# Patient Record
Sex: FEMALE | Race: WHITE | NOT HISPANIC OR LATINO | Employment: FULL TIME | URBAN - METROPOLITAN AREA
[De-identification: names, ages, dates, MRNs, and addresses within clinical notes are randomized per-mention and may not be internally consistent; named-entity substitution may affect disease eponyms.]

---

## 2017-03-22 ENCOUNTER — HISTORICAL (OUTPATIENT)
Dept: ADMINISTRATIVE | Facility: HOSPITAL | Age: 42
End: 2017-03-22

## 2018-05-11 ENCOUNTER — HISTORICAL (OUTPATIENT)
Dept: ADMINISTRATIVE | Facility: HOSPITAL | Age: 43
End: 2018-05-11

## 2018-05-11 LAB
ALBUMIN SERPL-MCNC: 3.8 GM/DL (ref 3.4–5)
ALBUMIN/GLOB SERPL: 1 RATIO (ref 1.1–2)
ALP SERPL-CCNC: 52 UNIT/L (ref 38–126)
ALT SERPL-CCNC: 30 UNIT/L (ref 12–78)
AST SERPL-CCNC: 18 UNIT/L (ref 15–37)
BILIRUB SERPL-MCNC: 0.5 MG/DL (ref 0.2–1)
BILIRUBIN DIRECT+TOT PNL SERPL-MCNC: 0.1 MG/DL (ref 0–0.5)
BILIRUBIN DIRECT+TOT PNL SERPL-MCNC: 0.4 MG/DL (ref 0–0.8)
BUN SERPL-MCNC: 17 MG/DL (ref 7–18)
CALCIUM SERPL-MCNC: 9.3 MG/DL (ref 8.5–10.1)
CHLORIDE SERPL-SCNC: 107 MMOL/L (ref 98–107)
CHOLEST SERPL-MCNC: 208 MG/DL (ref 0–200)
CHOLEST/HDLC SERPL: 5 {RATIO} (ref 0–4)
CO2 SERPL-SCNC: 28 MMOL/L (ref 21–32)
CREAT SERPL-MCNC: 0.78 MG/DL (ref 0.55–1.02)
ERYTHROCYTE [DISTWIDTH] IN BLOOD BY AUTOMATED COUNT: 11.9 % (ref 11.5–17)
GLOBULIN SER-MCNC: 3.7 GM/DL (ref 2.4–3.5)
GLUCOSE SERPL-MCNC: 92 MG/DL (ref 74–106)
HCT VFR BLD AUTO: 43.7 % (ref 37–47)
HDLC SERPL-MCNC: 42 MG/DL (ref 35–60)
HGB BLD-MCNC: 14.4 GM/DL (ref 12–16)
LDLC SERPL CALC-MCNC: 136 MG/DL (ref 0–129)
MCH RBC QN AUTO: 32.5 PG (ref 27–31)
MCHC RBC AUTO-ENTMCNC: 33 GM/DL (ref 33–36)
MCV RBC AUTO: 98.6 FL (ref 80–94)
PLATELET # BLD AUTO: 177 X10(3)/MCL (ref 130–400)
PMV BLD AUTO: 9.5 FL (ref 9.4–12.4)
POTASSIUM SERPL-SCNC: 4.8 MMOL/L (ref 3.5–5.1)
PROT SERPL-MCNC: 7.5 GM/DL (ref 6.4–8.2)
RBC # BLD AUTO: 4.43 X10(6)/MCL (ref 4.2–5.4)
SODIUM SERPL-SCNC: 141 MMOL/L (ref 136–145)
TRIGL SERPL-MCNC: 149 MG/DL (ref 30–150)
TSH SERPL-ACNC: 1.76 MIU/L (ref 0.36–3.74)
VLDLC SERPL CALC-MCNC: 30 MG/DL
WBC # SPEC AUTO: 6 X10(3)/MCL (ref 4.5–11.5)

## 2018-09-25 ENCOUNTER — HISTORICAL (OUTPATIENT)
Dept: LAB | Facility: HOSPITAL | Age: 43
End: 2018-09-25

## 2018-09-26 LAB — GRAM STN SPEC: NORMAL

## 2018-09-27 LAB — FINAL CULTURE: NO GROWTH

## 2018-10-29 LAB — FINAL CULTURE: NORMAL

## 2019-11-06 ENCOUNTER — HISTORICAL (OUTPATIENT)
Dept: ADMINISTRATIVE | Facility: HOSPITAL | Age: 44
End: 2019-11-06

## 2019-11-06 LAB
ABS NEUT (OLG): 2.77 X10(3)/MCL (ref 2.1–9.2)
ALBUMIN SERPL-MCNC: 3.8 GM/DL (ref 3.4–5)
ALBUMIN/GLOB SERPL: 1.3 {RATIO}
ALP SERPL-CCNC: 36 UNIT/L (ref 38–126)
ALT SERPL-CCNC: 23 UNIT/L (ref 12–78)
AST SERPL-CCNC: 12 UNIT/L (ref 15–37)
BASOPHILS # BLD AUTO: 0 X10(3)/MCL (ref 0–0.2)
BASOPHILS NFR BLD AUTO: 1 %
BILIRUB SERPL-MCNC: 0.9 MG/DL (ref 0.2–1)
BILIRUBIN DIRECT+TOT PNL SERPL-MCNC: 0.2 MG/DL (ref 0–0.2)
BILIRUBIN DIRECT+TOT PNL SERPL-MCNC: 0.7 MG/DL (ref 0–0.8)
BUN SERPL-MCNC: 19 MG/DL (ref 7–18)
CALCIUM SERPL-MCNC: 9.3 MG/DL (ref 8.5–10.1)
CHLORIDE SERPL-SCNC: 105 MMOL/L (ref 98–107)
CHOLEST SERPL-MCNC: 148 MG/DL (ref 0–200)
CHOLEST/HDLC SERPL: 3.4 {RATIO} (ref 0–4)
CO2 SERPL-SCNC: 32 MMOL/L (ref 21–32)
CREAT SERPL-MCNC: 0.84 MG/DL (ref 0.55–1.02)
EOSINOPHIL # BLD AUTO: 0.1 X10(3)/MCL (ref 0–0.9)
EOSINOPHIL NFR BLD AUTO: 2 %
ERYTHROCYTE [DISTWIDTH] IN BLOOD BY AUTOMATED COUNT: 11.9 % (ref 11.5–17)
GLOBULIN SER-MCNC: 3 GM/DL (ref 2.4–3.5)
GLUCOSE SERPL-MCNC: 96 MG/DL (ref 74–106)
HCT VFR BLD AUTO: 41.6 % (ref 37–47)
HDLC SERPL-MCNC: 43 MG/DL (ref 35–60)
HGB BLD-MCNC: 13.3 GM/DL (ref 12–16)
LDLC SERPL CALC-MCNC: 93 MG/DL (ref 0–129)
LYMPHOCYTES # BLD AUTO: 1.7 X10(3)/MCL (ref 0.6–4.6)
LYMPHOCYTES NFR BLD AUTO: 34 %
MCH RBC QN AUTO: 31.4 PG (ref 27–31)
MCHC RBC AUTO-ENTMCNC: 32 GM/DL (ref 33–36)
MCV RBC AUTO: 98.1 FL (ref 80–94)
MONOCYTES # BLD AUTO: 0.4 X10(3)/MCL (ref 0.1–1.3)
MONOCYTES NFR BLD AUTO: 8 %
NEUTROPHILS # BLD AUTO: 2.77 X10(3)/MCL (ref 2.1–9.2)
NEUTROPHILS NFR BLD AUTO: 56 %
PLATELET # BLD AUTO: 165 X10(3)/MCL (ref 130–400)
PMV BLD AUTO: 10.6 FL (ref 9.4–12.4)
POTASSIUM SERPL-SCNC: 5.2 MMOL/L (ref 3.5–5.1)
PROT SERPL-MCNC: 6.8 GM/DL (ref 6.4–8.2)
RBC # BLD AUTO: 4.24 X10(6)/MCL (ref 4.2–5.4)
SODIUM SERPL-SCNC: 141 MMOL/L (ref 136–145)
TRIGL SERPL-MCNC: 61 MG/DL (ref 30–150)
TSH SERPL-ACNC: 1.86 MIU/L (ref 0.36–3.74)
VLDLC SERPL CALC-MCNC: 12 MG/DL
WBC # SPEC AUTO: 4.9 X10(3)/MCL (ref 4.5–11.5)

## 2021-07-02 LAB
PAP RECOMMENDATION EXT: NORMAL
PAP SMEAR: NORMAL

## 2021-10-05 LAB — BCS RECOMMENDATION EXT: NORMAL

## 2022-05-23 ENCOUNTER — OFFICE VISIT (OUTPATIENT)
Dept: PRIMARY CARE CLINIC | Facility: CLINIC | Age: 47
End: 2022-05-23
Payer: COMMERCIAL

## 2022-05-23 VITALS
RESPIRATION RATE: 18 BRPM | WEIGHT: 188.69 LBS | HEIGHT: 68 IN | BODY MASS INDEX: 28.6 KG/M2 | HEART RATE: 76 BPM | SYSTOLIC BLOOD PRESSURE: 124 MMHG | DIASTOLIC BLOOD PRESSURE: 86 MMHG | OXYGEN SATURATION: 98 % | TEMPERATURE: 98 F

## 2022-05-23 DIAGNOSIS — Z11.59 ENCOUNTER FOR HEPATITIS C SCREENING TEST FOR LOW RISK PATIENT: ICD-10-CM

## 2022-05-23 DIAGNOSIS — E78.49 OTHER HYPERLIPIDEMIA: ICD-10-CM

## 2022-05-23 DIAGNOSIS — Z23 NEED FOR TDAP VACCINATION: Primary | ICD-10-CM

## 2022-05-23 DIAGNOSIS — E66.3 OVERWEIGHT (BMI 25.0-29.9): ICD-10-CM

## 2022-05-23 DIAGNOSIS — Z00.00 ENCOUNTER FOR WELLNESS EXAMINATION IN ADULT: ICD-10-CM

## 2022-05-23 PROCEDURE — 90715 TDAP VACCINE GREATER THAN OR EQUAL TO 7YO IM: ICD-10-PCS | Mod: ,,, | Performed by: STUDENT IN AN ORGANIZED HEALTH CARE EDUCATION/TRAINING PROGRAM

## 2022-05-23 PROCEDURE — 90471 IMMUNIZATION ADMIN: CPT | Mod: ,,, | Performed by: STUDENT IN AN ORGANIZED HEALTH CARE EDUCATION/TRAINING PROGRAM

## 2022-05-23 PROCEDURE — 90715 TDAP VACCINE 7 YRS/> IM: CPT | Mod: ,,, | Performed by: STUDENT IN AN ORGANIZED HEALTH CARE EDUCATION/TRAINING PROGRAM

## 2022-05-23 PROCEDURE — 90471 TDAP VACCINE GREATER THAN OR EQUAL TO 7YO IM: ICD-10-PCS | Mod: ,,, | Performed by: STUDENT IN AN ORGANIZED HEALTH CARE EDUCATION/TRAINING PROGRAM

## 2022-05-23 PROCEDURE — 99386 PREV VISIT NEW AGE 40-64: CPT | Mod: 25,,, | Performed by: STUDENT IN AN ORGANIZED HEALTH CARE EDUCATION/TRAINING PROGRAM

## 2022-05-23 PROCEDURE — 99386 PR PREVENTIVE VISIT,NEW,40-64: ICD-10-PCS | Mod: 25,,, | Performed by: STUDENT IN AN ORGANIZED HEALTH CARE EDUCATION/TRAINING PROGRAM

## 2022-05-23 RX ORDER — ACETAMINOPHEN, DIPHENHYDRAMINE HCL, PHENYLEPHRINE HCL 325; 25; 5 MG/1; MG/1; MG/1
15 TABLET ORAL NIGHTLY
COMMUNITY

## 2022-05-23 RX ORDER — MULTIVITAMIN
1 TABLET ORAL DAILY
COMMUNITY

## 2022-05-23 NOTE — PROGRESS NOTES
Chief Complaint  Chief Complaint   Patient presents with    Establish Care       HPI  Carmita BISI Menjivar is a 46 y.o. female with no significant medical diagnoses who presents to clinic to [facility] and wellness examination.   Histories are reviewed.   Patient has no complaint today. She works out regularly and recently lost a lot of weight.   Denies smoking. Drinks socially.   Lives at home with  and 2 children (17 and 15 year old)  Eats healthy  Patient follows up with Dr Pagette for Gyn needs and [facility]G. Next visit 2022    Health Maintenance       Date Due Completion Date    Hepatitis C Screening Never done ---    Cervical Cancer Screening Never done ---    HIV Screening Never done ---    TETANUS VACCINE Never done ---    Mammogram Never done ---    Colorectal Cancer Screening Never done ---    COVID-19 Vaccine (3 - Booster for Pfizer series) 2022    Influenza Vaccine (Season Ended) 2022    Lipid Panel 2024          PAST MEDICAL HISTORY:  History reviewed. No pertinent past medical history.    PAST SURGICAL HISTORY:  Past Surgical History:   Procedure Laterality Date    ABLATION      Uterine    TONSILLECTOMY      TUBAL LIGATION         SOCIAL HISTORY:  Social History     Socioeconomic History    Marital status:    Tobacco Use    Smoking status: Former Smoker     Types: Cigarettes     Quit date: 2006     Years since quittin.4    Smokeless tobacco: Never Used   Substance and Sexual Activity    Alcohol use: Yes     Comment: 3 times per week     Drug use: Never    Sexual activity: Yes       FAMILY HISTORY:  Family History   Problem Relation Age of Onset    Brain cancer Mother     Melanoma Mother     Heart disease Father     Prostate cancer Father     Coronary artery disease Father        ALLERGIES AND MEDICATIONS: updated and reviewed.  Review of patient's allergies indicates:   Allergen Reactions    Mangosteen (garcinia  "markus) José Miguel Ng      Current Outpatient Medications   Medication Sig Dispense Refill    melatonin 10 mg Tab Take 20 mg by mouth every evening.      multivitamin (THERAGRAN) per tablet Take 1 tablet by mouth once daily.       No current facility-administered medications for this visit.         ROS  Review of Systems   Constitutional: Negative for activity change, appetite change and fatigue.   HENT: Negative for congestion, ear discharge, hearing loss and trouble swallowing.    Eyes: Negative for photophobia and visual disturbance.   Respiratory: Negative for cough, chest tightness and shortness of breath.    Cardiovascular: Negative for chest pain, palpitations and leg swelling.   Gastrointestinal: Negative for abdominal distention, abdominal pain, constipation, diarrhea, nausea and vomiting.   Endocrine: Negative for cold intolerance and heat intolerance.   Genitourinary: Negative for difficulty urinating and flank pain.   Musculoskeletal: Negative for joint swelling and myalgias.   Skin: Negative for color change, rash and wound.   Neurological: Negative for dizziness, weakness, numbness and headaches.   Hematological: Does not bruise/bleed easily.   Psychiatric/Behavioral: Negative for agitation, behavioral problems and sleep disturbance. The patient is not nervous/anxious.            Physical Exam  Vitals:    05/23/22 1405   BP: 124/86   BP Location: Left arm   Patient Position: Sitting   BP Method: Large (Automatic)   Pulse: 76   Resp: 18   Temp: 98.4 °F (36.9 °C)   SpO2: 98%   Weight: 85.6 kg (188 lb 11.4 oz)   Height: 5' 8" (1.727 m)    Body mass index is 28.69 kg/m².  Weight: 85.6 kg (188 lb 11.4 oz)   Height: 5' 8" (172.7 cm)   Physical Exam  Vitals and nursing note reviewed.   Constitutional:       Appearance: Normal appearance.   HENT:      Head: Normocephalic and atraumatic.      Right Ear: Tympanic membrane normal.      Left Ear: Tympanic membrane normal.      Nose: Nose normal.      " Mouth/Throat:      Mouth: Mucous membranes are moist.      Pharynx: Oropharynx is clear.   Eyes:      Extraocular Movements: Extraocular movements intact.      Conjunctiva/sclera: Conjunctivae normal.   Cardiovascular:      Rate and Rhythm: Normal rate and regular rhythm.      Pulses: Normal pulses.   Pulmonary:      Effort: Pulmonary effort is normal.      Breath sounds: Normal breath sounds. No stridor. No wheezing.   Abdominal:      General: There is no distension.      Palpations: Abdomen is soft.      Tenderness: There is no abdominal tenderness.   Musculoskeletal:         General: No swelling, tenderness or deformity. Normal range of motion.      Cervical back: Neck supple.   Lymphadenopathy:      Cervical: No cervical adenopathy.   Skin:     General: Skin is warm.      Capillary Refill: Capillary refill takes less than 2 seconds.      Findings: No rash.   Neurological:      General: No focal deficit present.      Mental Status: She is alert and oriented to person, place, and time. Mental status is at baseline.   Psychiatric:         Mood and Affect: Mood normal.         Thought Content: Thought content normal.         Judgment: Judgment normal.           Assessment & Plan  Problem List Items Addressed This Visit        Cardiac/Vascular    Other hyperlipidemia    Relevant Orders    CBC Auto Differential    Comprehensive Metabolic Panel    Hemoglobin A1C    Lipid Panel    TSH       Endocrine    Overweight (BMI 25.0-29.9)    Relevant Orders    CBC Auto Differential    Comprehensive Metabolic Panel    Hemoglobin A1C    Lipid Panel    TSH      Other Visit Diagnoses     Encounter for wellness examination in adult    -  Primary    Relevant Orders    CBC Auto Differential    Comprehensive Metabolic Panel    Hemoglobin A1C    Lipid Panel    TSH    Encounter for hepatitis C screening test for low risk patient        Relevant Orders    Hepatitis C Antibody        Overall health status was reviewed   Good health habits  reinforced   Pending labs   Appropriate recommendations and preventative care medical information provided, with annual wellness exam encouraged.     Health Maintenance reviewed, Tdap given today     Follow-up: Follow up in about 1 year (around 5/23/2023) for Wellness.

## 2022-05-24 ENCOUNTER — DOCUMENTATION ONLY (OUTPATIENT)
Dept: PRIMARY CARE CLINIC | Facility: CLINIC | Age: 47
End: 2022-05-24
Payer: COMMERCIAL

## 2022-05-26 ENCOUNTER — PATIENT MESSAGE (OUTPATIENT)
Dept: PRIMARY CARE CLINIC | Facility: CLINIC | Age: 47
End: 2022-05-26

## 2022-05-26 ENCOUNTER — CLINICAL SUPPORT (OUTPATIENT)
Dept: PRIMARY CARE CLINIC | Facility: CLINIC | Age: 47
End: 2022-05-26
Payer: COMMERCIAL

## 2022-05-26 DIAGNOSIS — E78.49 OTHER HYPERLIPIDEMIA: ICD-10-CM

## 2022-05-26 DIAGNOSIS — R94.4 ABNORMAL RENAL FUNCTION TEST: Primary | ICD-10-CM

## 2022-05-26 DIAGNOSIS — Z00.00 ENCOUNTER FOR WELLNESS EXAMINATION IN ADULT: ICD-10-CM

## 2022-05-26 DIAGNOSIS — E66.3 OVERWEIGHT (BMI 25.0-29.9): ICD-10-CM

## 2022-05-26 DIAGNOSIS — Z11.59 ENCOUNTER FOR HEPATITIS C SCREENING TEST FOR LOW RISK PATIENT: ICD-10-CM

## 2022-05-26 LAB
ALBUMIN SERPL-MCNC: 4.2 GM/DL (ref 3.5–5)
ALBUMIN/GLOB SERPL: 1.3 RATIO (ref 1.1–2)
ALP SERPL-CCNC: 34 UNIT/L (ref 40–150)
ALT SERPL-CCNC: 12 UNIT/L (ref 0–55)
AST SERPL-CCNC: 16 UNIT/L (ref 5–34)
BASOPHILS # BLD AUTO: 0.07 X10(3)/MCL (ref 0–0.2)
BASOPHILS NFR BLD AUTO: 1 %
BILIRUBIN DIRECT+TOT PNL SERPL-MCNC: 1.1 MG/DL
BUN SERPL-MCNC: 22.4 MG/DL (ref 7–18.7)
CALCIUM SERPL-MCNC: 10.4 MG/DL (ref 8.4–10.2)
CHLORIDE SERPL-SCNC: 107 MMOL/L (ref 98–107)
CHOLEST SERPL-MCNC: 177 MG/DL
CHOLEST/HDLC SERPL: 4 {RATIO} (ref 0–5)
CO2 SERPL-SCNC: 24 MMOL/L (ref 22–29)
CREAT SERPL-MCNC: 1.07 MG/DL (ref 0.55–1.02)
EOSINOPHIL # BLD AUTO: 0.16 X10(3)/MCL (ref 0–0.9)
EOSINOPHIL NFR BLD AUTO: 2.3 %
ERYTHROCYTE [DISTWIDTH] IN BLOOD BY AUTOMATED COUNT: 11.9 % (ref 11.5–17)
EST. AVERAGE GLUCOSE BLD GHB EST-MCNC: 93.9 MG/DL
GLOBULIN SER-MCNC: 3.2 GM/DL (ref 2.4–3.5)
GLUCOSE SERPL-MCNC: 89 MG/DL (ref 74–100)
HBA1C MFR BLD: 4.9 %
HCT VFR BLD AUTO: 42 % (ref 37–47)
HDLC SERPL-MCNC: 48 MG/DL (ref 35–60)
HGB BLD-MCNC: 14.2 GM/DL (ref 12–16)
IMM GRANULOCYTES # BLD AUTO: 0.02 X10(3)/MCL (ref 0–0.02)
IMM GRANULOCYTES NFR BLD AUTO: 0.3 % (ref 0–0.43)
LDLC SERPL CALC-MCNC: 109 MG/DL (ref 50–140)
LYMPHOCYTES # BLD AUTO: 1.43 X10(3)/MCL (ref 0.6–4.6)
LYMPHOCYTES NFR BLD AUTO: 20.8 %
MCH RBC QN AUTO: 32.9 PG (ref 27–31)
MCHC RBC AUTO-ENTMCNC: 33.8 MG/DL (ref 33–36)
MCV RBC AUTO: 97.2 FL (ref 80–94)
MONOCYTES # BLD AUTO: 0.71 X10(3)/MCL (ref 0.1–1.3)
MONOCYTES NFR BLD AUTO: 10.3 %
NEUTROPHILS # BLD AUTO: 4.5 X10(3)/MCL (ref 2.1–9.2)
NEUTROPHILS NFR BLD AUTO: 65.3 %
NRBC BLD AUTO-RTO: 0 %
PLATELET # BLD AUTO: 179 X10(3)/MCL (ref 130–400)
PMV BLD AUTO: 10.9 FL (ref 9.4–12.4)
POTASSIUM SERPL-SCNC: 4.8 MMOL/L (ref 3.5–5.1)
PROT SERPL-MCNC: 7.4 GM/DL (ref 6.4–8.3)
RBC # BLD AUTO: 4.32 X10(6)/MCL (ref 4.2–5.4)
SODIUM SERPL-SCNC: 141 MMOL/L (ref 136–145)
TRIGL SERPL-MCNC: 99 MG/DL (ref 37–140)
TSH SERPL-ACNC: 2.63 UIU/ML (ref 0.35–4.94)
VLDLC SERPL CALC-MCNC: 20 MG/DL
WBC # SPEC AUTO: 6.9 X10(3)/MCL (ref 4.5–11.5)

## 2022-05-26 PROCEDURE — 83036 HEMOGLOBIN GLYCOSYLATED A1C: CPT | Performed by: STUDENT IN AN ORGANIZED HEALTH CARE EDUCATION/TRAINING PROGRAM

## 2022-05-26 PROCEDURE — 86803 HEPATITIS C AB TEST: CPT

## 2022-05-26 PROCEDURE — 36415 PR COLLECTION VENOUS BLOOD,VENIPUNCTURE: ICD-10-PCS | Mod: ,,, | Performed by: STUDENT IN AN ORGANIZED HEALTH CARE EDUCATION/TRAINING PROGRAM

## 2022-05-26 PROCEDURE — 36415 COLL VENOUS BLD VENIPUNCTURE: CPT

## 2022-05-26 PROCEDURE — 80053 COMPREHEN METABOLIC PANEL: CPT | Performed by: STUDENT IN AN ORGANIZED HEALTH CARE EDUCATION/TRAINING PROGRAM

## 2022-05-26 PROCEDURE — 80061 LIPID PANEL: CPT | Performed by: STUDENT IN AN ORGANIZED HEALTH CARE EDUCATION/TRAINING PROGRAM

## 2022-05-26 PROCEDURE — 85025 COMPLETE CBC W/AUTO DIFF WBC: CPT | Performed by: STUDENT IN AN ORGANIZED HEALTH CARE EDUCATION/TRAINING PROGRAM

## 2022-05-26 PROCEDURE — 36415 COLL VENOUS BLD VENIPUNCTURE: CPT | Mod: ,,, | Performed by: STUDENT IN AN ORGANIZED HEALTH CARE EDUCATION/TRAINING PROGRAM

## 2022-05-26 PROCEDURE — 84443 ASSAY THYROID STIM HORMONE: CPT | Performed by: STUDENT IN AN ORGANIZED HEALTH CARE EDUCATION/TRAINING PROGRAM

## 2022-05-27 LAB — HCV AB SERPL QL IA: NONREACTIVE

## 2022-08-16 ENCOUNTER — TELEPHONE (OUTPATIENT)
Dept: PRIMARY CARE CLINIC | Facility: CLINIC | Age: 47
End: 2022-08-16
Payer: COMMERCIAL

## 2022-08-16 DIAGNOSIS — E78.49 OTHER HYPERLIPIDEMIA: Primary | ICD-10-CM

## 2022-08-16 LAB
ALBUMIN SERPL-MCNC: 4.2 GM/DL (ref 3.5–5)
ALBUMIN/GLOB SERPL: 1.3 RATIO (ref 1.1–2)
ALP SERPL-CCNC: 33 UNIT/L (ref 40–150)
ALT SERPL-CCNC: 14 UNIT/L (ref 0–55)
AST SERPL-CCNC: 19 UNIT/L (ref 5–34)
BILIRUBIN DIRECT+TOT PNL SERPL-MCNC: 0.6 MG/DL
BUN SERPL-MCNC: 19.8 MG/DL (ref 7–18.7)
CALCIUM SERPL-MCNC: 10.5 MG/DL (ref 8.4–10.2)
CHLORIDE SERPL-SCNC: 107 MMOL/L (ref 98–107)
CO2 SERPL-SCNC: 28 MMOL/L (ref 22–29)
CREAT SERPL-MCNC: 0.96 MG/DL (ref 0.55–1.02)
GFR SERPLBLD CREATININE-BSD FMLA CKD-EPI: >60 MLS/MIN/1.73/M2
GLOBULIN SER-MCNC: 3.3 GM/DL (ref 2.4–3.5)
GLUCOSE SERPL-MCNC: 93 MG/DL (ref 74–100)
POTASSIUM SERPL-SCNC: 5 MMOL/L (ref 3.5–5.1)
PROT SERPL-MCNC: 7.5 GM/DL (ref 6.4–8.3)
SODIUM SERPL-SCNC: 140 MMOL/L (ref 136–145)

## 2022-08-16 PROCEDURE — 80053 COMPREHEN METABOLIC PANEL: CPT | Performed by: STUDENT IN AN ORGANIZED HEALTH CARE EDUCATION/TRAINING PROGRAM

## 2022-08-16 NOTE — TELEPHONE ENCOUNTER
----- Message from Joel Richards sent at 8/16/2022  8:41 AM CDT -----  .Type:  Needs to reschedule Blood work to this week.    Who Called: pt  Would the patient rather a call back or a response via [facility]? Call back  Best Call Back Number: [phone]  Additional Information: Wanting to reschedule the Nurse Visit for blood work from next week to this week.

## 2022-08-18 ENCOUNTER — TELEPHONE (OUTPATIENT)
Dept: PRIMARY CARE CLINIC | Facility: CLINIC | Age: 47
End: 2022-08-18
Payer: COMMERCIAL

## 2022-08-18 NOTE — TELEPHONE ENCOUNTER
----- Message from Celeste Acosta MD sent at 8/18/2022  9:43 AM CDT -----  Kidney function is improving. Continue with good hydration daily.   Patient still has slightly high calcium level unchanged from last visit- monitor. Stop all Ca supplement if she is taking any, including multivitamin

## 2022-08-18 NOTE — TELEPHONE ENCOUNTER
Patient inquiring about her lab results. She does not have appt coming up. Only labs ordered at her last visit.

## 2022-10-20 ENCOUNTER — OFFICE VISIT (OUTPATIENT)
Dept: URGENT CARE | Facility: CLINIC | Age: 47
End: 2022-10-20
Payer: COMMERCIAL

## 2022-10-20 VITALS
DIASTOLIC BLOOD PRESSURE: 94 MMHG | RESPIRATION RATE: 18 BRPM | SYSTOLIC BLOOD PRESSURE: 143 MMHG | TEMPERATURE: 98 F | WEIGHT: 185 LBS | OXYGEN SATURATION: 99 % | BODY MASS INDEX: 27.4 KG/M2 | HEART RATE: 77 BPM | HEIGHT: 69 IN

## 2022-10-20 DIAGNOSIS — J01.90 ACUTE RHINOSINUSITIS: Primary | ICD-10-CM

## 2022-10-20 LAB
CTP QC/QA: YES
CTP QC/QA: YES
FLUAV AG NPH QL: NEGATIVE
FLUBV AG NPH QL: NEGATIVE
SARS-COV-2 RDRP RESP QL NAA+PROBE: NEGATIVE

## 2022-10-20 PROCEDURE — 87804 POCT INFLUENZA A/B: ICD-10-PCS | Mod: QW,,, | Performed by: FAMILY MEDICINE

## 2022-10-20 PROCEDURE — 1159F PR MEDICATION LIST DOCUMENTED IN MEDICAL RECORD: ICD-10-PCS | Mod: CPTII,,, | Performed by: FAMILY MEDICINE

## 2022-10-20 PROCEDURE — 3077F PR MOST RECENT SYSTOLIC BLOOD PRESSURE >= 140 MM HG: ICD-10-PCS | Mod: CPTII,,, | Performed by: FAMILY MEDICINE

## 2022-10-20 PROCEDURE — 3080F PR MOST RECENT DIASTOLIC BLOOD PRESSURE >= 90 MM HG: ICD-10-PCS | Mod: CPTII,,, | Performed by: FAMILY MEDICINE

## 2022-10-20 PROCEDURE — 99213 PR OFFICE/OUTPT VISIT, EST, LEVL III, 20-29 MIN: ICD-10-PCS | Mod: 25,,, | Performed by: FAMILY MEDICINE

## 2022-10-20 PROCEDURE — 1160F PR REVIEW ALL MEDS BY PRESCRIBER/CLIN PHARMACIST DOCUMENTED: ICD-10-PCS | Mod: CPTII,,, | Performed by: FAMILY MEDICINE

## 2022-10-20 PROCEDURE — 96372 THER/PROPH/DIAG INJ SC/IM: CPT | Mod: ,,, | Performed by: FAMILY MEDICINE

## 2022-10-20 PROCEDURE — 96372 PR INJECTION,THERAP/PROPH/DIAG2ST, IM OR SUBCUT: ICD-10-PCS | Mod: ,,, | Performed by: FAMILY MEDICINE

## 2022-10-20 PROCEDURE — 3080F DIAST BP >= 90 MM HG: CPT | Mod: CPTII,,, | Performed by: FAMILY MEDICINE

## 2022-10-20 PROCEDURE — 1160F RVW MEDS BY RX/DR IN RCRD: CPT | Mod: CPTII,,, | Performed by: FAMILY MEDICINE

## 2022-10-20 PROCEDURE — 87635: ICD-10-PCS | Mod: QW,,, | Performed by: FAMILY MEDICINE

## 2022-10-20 PROCEDURE — 87635 SARS-COV-2 COVID-19 AMP PRB: CPT | Mod: QW,,, | Performed by: FAMILY MEDICINE

## 2022-10-20 PROCEDURE — 87804 INFLUENZA ASSAY W/OPTIC: CPT | Mod: QW,,, | Performed by: FAMILY MEDICINE

## 2022-10-20 PROCEDURE — 1159F MED LIST DOCD IN RCRD: CPT | Mod: CPTII,,, | Performed by: FAMILY MEDICINE

## 2022-10-20 PROCEDURE — 3077F SYST BP >= 140 MM HG: CPT | Mod: CPTII,,, | Performed by: FAMILY MEDICINE

## 2022-10-20 PROCEDURE — 99213 OFFICE O/P EST LOW 20 MIN: CPT | Mod: 25,,, | Performed by: FAMILY MEDICINE

## 2022-10-20 RX ORDER — BETAMETHASONE SODIUM PHOSPHATE AND BETAMETHASONE ACETATE 3; 3 MG/ML; MG/ML
9 INJECTION, SUSPENSION INTRA-ARTICULAR; INTRALESIONAL; INTRAMUSCULAR; SOFT TISSUE
Status: COMPLETED | OUTPATIENT
Start: 2022-10-20 | End: 2022-10-20

## 2022-10-20 RX ADMIN — BETAMETHASONE SODIUM PHOSPHATE AND BETAMETHASONE ACETATE 9 MG: 3; 3 INJECTION, SUSPENSION INTRA-ARTICULAR; INTRALESIONAL; INTRAMUSCULAR; SOFT TISSUE at 10:10

## 2022-10-20 NOTE — PROGRESS NOTES
"Subjective:       Patient ID: Carmita Menjivar is a 47 y.o. female.    Vitals:  height is 5' 9" (1.753 m) and weight is 83.9 kg (185 lb). Her temperature is 97.9 °F (36.6 °C). Her blood pressure is 143/94 (abnormal) and her pulse is 77. Her respiration is 18 and oxygen saturation is 99%.     Chief Complaint: Nasal Congestion and Headache    Patient comes in today with 2 days of nasal congestion.  Denies fever, chills, nausea, vomiting, respiratory distress, difficulty swallowing, or neck rigidity.    X 2 days- congestion, pnd , mild cough, headache.       Constitution: Negative for activity change, appetite change, chills, sweating, fatigue, fever and generalized weakness.   HENT:  Positive for congestion and postnasal drip. Negative for ear pain, ear discharge, drooling, sinus pressure, sore throat and trouble swallowing.    Neck: Negative for neck stiffness and neck swelling.   Cardiovascular:  Negative for chest pain, sob on exertion and passing out.   Eyes:  Negative for eye discharge, eye itching, eye pain, vision loss and blurred vision.   Respiratory:  Positive for cough. Negative for chest tightness, sputum production, shortness of breath, stridor, wheezing and asthma.    Gastrointestinal:  Negative for nausea, vomiting and diarrhea.   Skin:  Negative for color change, pale and rash.   Allergic/Immunologic: Negative for asthma, recurrent sinus infections and sneezing.   Neurological:  Positive for headaches. Negative for disorientation and altered mental status.   Psychiatric/Behavioral:  Negative for altered mental status, disorientation and confusion.      Objective:      Physical Exam   Constitutional: She is oriented to person, place, and time. She appears well-developed. She is cooperative.  Non-toxic appearance. She does not appear ill. No distress.   HENT:   Head: Normocephalic and atraumatic.   Ears:   Right Ear: Hearing, tympanic membrane, external ear and ear canal normal.   Left Ear: Hearing, " tympanic membrane, external ear and ear canal normal.   Nose: Nose normal. No mucosal edema, rhinorrhea or nasal deformity. No epistaxis. Right sinus exhibits no maxillary sinus tenderness and no frontal sinus tenderness. Left sinus exhibits no maxillary sinus tenderness and no frontal sinus tenderness.   Mouth/Throat: Uvula is midline, oropharynx is clear and moist and mucous membranes are normal. No trismus in the jaw. Normal dentition. No uvula swelling. No oropharyngeal exudate, posterior oropharyngeal edema or posterior oropharyngeal erythema.   Eyes: Conjunctivae and lids are normal. No scleral icterus.   Neck: Trachea normal and phonation normal. Neck supple. No edema present. No erythema present. No neck rigidity present.   Cardiovascular: Normal rate, regular rhythm, normal heart sounds and normal pulses.   Pulmonary/Chest: Effort normal and breath sounds normal. No respiratory distress. She has no decreased breath sounds. She has no rhonchi.   Abdominal: Normal appearance.   Musculoskeletal:         General: No deformity.   Neurological: She is alert and oriented to person, place, and time. She exhibits normal muscle tone. Coordination normal.   Skin: Skin is not diaphoretic and not pale.   Psychiatric: Her speech is normal and behavior is normal. Judgment and thought content normal.   Nursing note and vitals reviewed.      Assessment:       1. Acute rhinosinusitis          Plan:         Flu and COVID negative. Symptoms, likely diagnosis, and management were discussed.  Discussed the 3 categories of bacterial sinusitis and to return if any of those signs and symptoms develop.  Patient will seek further medical attention if symptoms worsen, change, or do not improve with current plan.      Acute rhinosinusitis  -     POCT COVID-19 Rapid Screening  -     POCT Influenza A/B  -     betamethasone acetate-betamethasone sodium phosphate injection 9 mg

## 2022-10-20 NOTE — PATIENT INSTRUCTIONS
Drink plenty of fluids.      Get plenty of rest.      Tylenol or Motrin as needed.      Go to the ER with any significant change or worsening of symptoms.     Follow up with your primary care doctor.

## 2022-11-15 ENCOUNTER — OFFICE VISIT (OUTPATIENT)
Dept: PRIMARY CARE CLINIC | Facility: CLINIC | Age: 47
End: 2022-11-15
Payer: COMMERCIAL

## 2022-11-15 VITALS
RESPIRATION RATE: 18 BRPM | SYSTOLIC BLOOD PRESSURE: 110 MMHG | WEIGHT: 193 LBS | DIASTOLIC BLOOD PRESSURE: 70 MMHG | BODY MASS INDEX: 28.58 KG/M2 | HEIGHT: 69 IN

## 2022-11-15 DIAGNOSIS — M54.2 NECK PAIN WITHOUT INJURY: Primary | ICD-10-CM

## 2022-11-15 DIAGNOSIS — M77.8 RIGHT ELBOW TENDONITIS: ICD-10-CM

## 2022-11-15 DIAGNOSIS — Z23 NEED FOR INFLUENZA VACCINATION: ICD-10-CM

## 2022-11-15 PROCEDURE — 90686 IIV4 VACC NO PRSV 0.5 ML IM: CPT | Mod: ,,, | Performed by: STUDENT IN AN ORGANIZED HEALTH CARE EDUCATION/TRAINING PROGRAM

## 2022-11-15 PROCEDURE — 1159F MED LIST DOCD IN RCRD: CPT | Mod: CPTII,,, | Performed by: STUDENT IN AN ORGANIZED HEALTH CARE EDUCATION/TRAINING PROGRAM

## 2022-11-15 PROCEDURE — 3078F DIAST BP <80 MM HG: CPT | Mod: CPTII,,, | Performed by: STUDENT IN AN ORGANIZED HEALTH CARE EDUCATION/TRAINING PROGRAM

## 2022-11-15 PROCEDURE — 99213 OFFICE O/P EST LOW 20 MIN: CPT | Mod: 25,,, | Performed by: STUDENT IN AN ORGANIZED HEALTH CARE EDUCATION/TRAINING PROGRAM

## 2022-11-15 PROCEDURE — 1160F PR REVIEW ALL MEDS BY PRESCRIBER/CLIN PHARMACIST DOCUMENTED: ICD-10-PCS | Mod: CPTII,,, | Performed by: STUDENT IN AN ORGANIZED HEALTH CARE EDUCATION/TRAINING PROGRAM

## 2022-11-15 PROCEDURE — 3074F SYST BP LT 130 MM HG: CPT | Mod: CPTII,,, | Performed by: STUDENT IN AN ORGANIZED HEALTH CARE EDUCATION/TRAINING PROGRAM

## 2022-11-15 PROCEDURE — 1160F RVW MEDS BY RX/DR IN RCRD: CPT | Mod: CPTII,,, | Performed by: STUDENT IN AN ORGANIZED HEALTH CARE EDUCATION/TRAINING PROGRAM

## 2022-11-15 PROCEDURE — 3008F BODY MASS INDEX DOCD: CPT | Mod: CPTII,,, | Performed by: STUDENT IN AN ORGANIZED HEALTH CARE EDUCATION/TRAINING PROGRAM

## 2022-11-15 PROCEDURE — 3074F PR MOST RECENT SYSTOLIC BLOOD PRESSURE < 130 MM HG: ICD-10-PCS | Mod: CPTII,,, | Performed by: STUDENT IN AN ORGANIZED HEALTH CARE EDUCATION/TRAINING PROGRAM

## 2022-11-15 PROCEDURE — 90686 FLU VACCINE (QUAD) GREATER THAN OR EQUAL TO 3YO PRESERVATIVE FREE IM: ICD-10-PCS | Mod: ,,, | Performed by: STUDENT IN AN ORGANIZED HEALTH CARE EDUCATION/TRAINING PROGRAM

## 2022-11-15 PROCEDURE — 90471 FLU VACCINE (QUAD) GREATER THAN OR EQUAL TO 3YO PRESERVATIVE FREE IM: ICD-10-PCS | Mod: ,,, | Performed by: STUDENT IN AN ORGANIZED HEALTH CARE EDUCATION/TRAINING PROGRAM

## 2022-11-15 PROCEDURE — 3078F PR MOST RECENT DIASTOLIC BLOOD PRESSURE < 80 MM HG: ICD-10-PCS | Mod: CPTII,,, | Performed by: STUDENT IN AN ORGANIZED HEALTH CARE EDUCATION/TRAINING PROGRAM

## 2022-11-15 PROCEDURE — 99213 PR OFFICE/OUTPT VISIT, EST, LEVL III, 20-29 MIN: ICD-10-PCS | Mod: 25,,, | Performed by: STUDENT IN AN ORGANIZED HEALTH CARE EDUCATION/TRAINING PROGRAM

## 2022-11-15 PROCEDURE — 3008F PR BODY MASS INDEX (BMI) DOCUMENTED: ICD-10-PCS | Mod: CPTII,,, | Performed by: STUDENT IN AN ORGANIZED HEALTH CARE EDUCATION/TRAINING PROGRAM

## 2022-11-15 PROCEDURE — 1159F PR MEDICATION LIST DOCUMENTED IN MEDICAL RECORD: ICD-10-PCS | Mod: CPTII,,, | Performed by: STUDENT IN AN ORGANIZED HEALTH CARE EDUCATION/TRAINING PROGRAM

## 2022-11-15 PROCEDURE — 90471 IMMUNIZATION ADMIN: CPT | Mod: ,,, | Performed by: STUDENT IN AN ORGANIZED HEALTH CARE EDUCATION/TRAINING PROGRAM

## 2022-11-15 NOTE — PROGRESS NOTES
"Chief Complaint  Chief Complaint   Patient presents with    Elbow Injury    Neck Pain     Discuss referral to physical therapy(MTS)    Flu Vaccine       HPI  Carmita Menjivar is a 47 y.o. female with medical diagnoses as listed in the medical history who presents to clinic complaining of neck tenderness and right elbow weakness.   Patient has had neck pain on and off for years with activity. Never had any XR or work up. Recently with exercise, she has had constant strain and tightness.   Also complains of right elbow pain after a boxing section at the gym. She continues to do physical work in building a camper within the past 4-5 weeks. Denies constant pain but feels weak when picking up objects.     Health Maintenance         Date Due Completion Date    HIV Screening Never done ---    Colorectal Cancer Screening Never done ---    COVID-19 Vaccine (3 - Booster for Pfizer series) 12/31/2021 11/5/2021    Cervical Cancer Screening 07/02/2022 7/2/2021    Mammogram 10/05/2022 10/5/2021    Lipid Panel 05/26/2027 5/26/2022    TETANUS VACCINE 05/23/2032 5/23/2022            ALLERGIES AND MEDICATIONS: updated and reviewed.  Review of patient's allergies indicates:   Allergen Reactions    Mangosteen (garcinia mangostana) Rash     Mangos      Current Outpatient Medications   Medication Sig Dispense Refill    mecobalamin (B12 ACTIVE ORAL) Take by mouth.      melatonin 10 mg Tab Take 20 mg by mouth every evening.      multivitamin (THERAGRAN) per tablet Take 1 tablet by mouth once daily.       No current facility-administered medications for this visit.       Histories are reviewed and updated as appropriate     Review of Systems  Comprehensive review of system performed- negative except noted in HPI       Objective:   Vitals:    11/15/22 0959   BP: 110/70   BP Location: Left arm   Resp: 18   Weight: 87.5 kg (193 lb)   Height: 5' 9" (1.753 m)    Body mass index is 28.5 kg/m².  Physical Exam  Vitals and nursing note reviewed. "   Constitutional:       General: She is not in acute distress.     Appearance: Normal appearance.   HENT:      Head: Normocephalic and atraumatic.   Cardiovascular:      Rate and Rhythm: Normal rate and regular rhythm.   Pulmonary:      Effort: Pulmonary effort is normal.      Breath sounds: Normal breath sounds.   Musculoskeletal:         General: No tenderness or signs of injury. Normal range of motion.      Cervical back: Normal range of motion. No rigidity or tenderness.      Comments: Left elbow with normal range of motion and strength    Skin:     General: Skin is warm and dry.   Neurological:      General: No focal deficit present.      Mental Status: She is alert and oriented to person, place, and time. Mental status is at baseline.   Psychiatric:         Mood and Affect: Mood normal.         Behavior: Behavior normal.         Assessment & Plan  1. Neck pain without injury  -     Ambulatory referral/consult to Physical/Occupational Therapy; Future; Expected date: 11/22/2022    2. Right elbow tendonitis  -     Ambulatory referral/consult to Physical/Occupational Therapy; Future; Expected date: 11/22/2022        -   Voltaren gel as needed   3. Need for influenza vaccination  -     Influenza - Quadrivalent *Preferred* (6 months+) (PF)         Return to clinic as needed

## 2023-01-04 ENCOUNTER — OFFICE VISIT (OUTPATIENT)
Dept: PRIMARY CARE CLINIC | Facility: CLINIC | Age: 48
End: 2023-01-04
Payer: COMMERCIAL

## 2023-01-04 VITALS
RESPIRATION RATE: 18 BRPM | HEIGHT: 69 IN | BODY MASS INDEX: 28.5 KG/M2 | SYSTOLIC BLOOD PRESSURE: 116 MMHG | OXYGEN SATURATION: 98 % | DIASTOLIC BLOOD PRESSURE: 80 MMHG | HEART RATE: 72 BPM

## 2023-01-04 DIAGNOSIS — M25.521 ELBOW PAIN, RIGHT: Primary | ICD-10-CM

## 2023-01-04 DIAGNOSIS — M54.2 NECK PAIN: ICD-10-CM

## 2023-01-04 PROCEDURE — 3074F PR MOST RECENT SYSTOLIC BLOOD PRESSURE < 130 MM HG: ICD-10-PCS | Mod: CPTII,,, | Performed by: STUDENT IN AN ORGANIZED HEALTH CARE EDUCATION/TRAINING PROGRAM

## 2023-01-04 PROCEDURE — 3074F SYST BP LT 130 MM HG: CPT | Mod: CPTII,,, | Performed by: STUDENT IN AN ORGANIZED HEALTH CARE EDUCATION/TRAINING PROGRAM

## 2023-01-04 PROCEDURE — 1159F MED LIST DOCD IN RCRD: CPT | Mod: CPTII,,, | Performed by: STUDENT IN AN ORGANIZED HEALTH CARE EDUCATION/TRAINING PROGRAM

## 2023-01-04 PROCEDURE — 3079F PR MOST RECENT DIASTOLIC BLOOD PRESSURE 80-89 MM HG: ICD-10-PCS | Mod: CPTII,,, | Performed by: STUDENT IN AN ORGANIZED HEALTH CARE EDUCATION/TRAINING PROGRAM

## 2023-01-04 PROCEDURE — 1160F PR REVIEW ALL MEDS BY PRESCRIBER/CLIN PHARMACIST DOCUMENTED: ICD-10-PCS | Mod: CPTII,,, | Performed by: STUDENT IN AN ORGANIZED HEALTH CARE EDUCATION/TRAINING PROGRAM

## 2023-01-04 PROCEDURE — 99214 OFFICE O/P EST MOD 30 MIN: CPT | Mod: ,,, | Performed by: STUDENT IN AN ORGANIZED HEALTH CARE EDUCATION/TRAINING PROGRAM

## 2023-01-04 PROCEDURE — 1160F RVW MEDS BY RX/DR IN RCRD: CPT | Mod: CPTII,,, | Performed by: STUDENT IN AN ORGANIZED HEALTH CARE EDUCATION/TRAINING PROGRAM

## 2023-01-04 PROCEDURE — 3008F BODY MASS INDEX DOCD: CPT | Mod: CPTII,,, | Performed by: STUDENT IN AN ORGANIZED HEALTH CARE EDUCATION/TRAINING PROGRAM

## 2023-01-04 PROCEDURE — 99214 PR OFFICE/OUTPT VISIT, EST, LEVL IV, 30-39 MIN: ICD-10-PCS | Mod: ,,, | Performed by: STUDENT IN AN ORGANIZED HEALTH CARE EDUCATION/TRAINING PROGRAM

## 2023-01-04 PROCEDURE — 1159F PR MEDICATION LIST DOCUMENTED IN MEDICAL RECORD: ICD-10-PCS | Mod: CPTII,,, | Performed by: STUDENT IN AN ORGANIZED HEALTH CARE EDUCATION/TRAINING PROGRAM

## 2023-01-04 PROCEDURE — 3079F DIAST BP 80-89 MM HG: CPT | Mod: CPTII,,, | Performed by: STUDENT IN AN ORGANIZED HEALTH CARE EDUCATION/TRAINING PROGRAM

## 2023-01-04 PROCEDURE — 3008F PR BODY MASS INDEX (BMI) DOCUMENTED: ICD-10-PCS | Mod: CPTII,,, | Performed by: STUDENT IN AN ORGANIZED HEALTH CARE EDUCATION/TRAINING PROGRAM

## 2023-01-04 NOTE — PROGRESS NOTES
"Chief Complaint  Chief Complaint   Patient presents with    Elbow Pain     Right Elbow pain        HPI  Carmita Menjivar is a 47 y.o. female who presents to clinic for ongoing right elbow pain. Patient has been in PT for almost 6 weeks and pain worsens at rest and with movement. Patient is unable to  coffee cup in the morning without pain.   Also complains of neck pain and tightness associated with radiating pain and numbness to her right arm and fingers for a while. Denies prior injury or work up for the neck.  Denies weakness of right arm.     Health Maintenance         Date Due Completion Date    HIV Screening Never done ---    Colorectal Cancer Screening Never done ---    COVID-19 Vaccine (3 - Booster for Pfizer series) 12/31/2021 11/5/2021    Cervical Cancer Screening 07/02/2022 7/2/2021    Mammogram 10/05/2022 10/5/2021    Lipid Panel 05/26/2027 5/26/2022    TETANUS VACCINE 05/23/2032 5/23/2022            ALLERGIES AND MEDICATIONS: updated and reviewed.  Review of patient's allergies indicates:   Allergen Reactions    Mangosteen (garcinia mangostana) Rash     Mangos      Current Outpatient Medications   Medication Sig Dispense Refill    mecobalamin (B12 ACTIVE ORAL) Take by mouth.      melatonin 10 mg Tab Take 20 mg by mouth every evening.      multivitamin (THERAGRAN) per tablet Take 1 tablet by mouth once daily.       No current facility-administered medications for this visit.       Histories are reviewed and updated as appropriate     Review of Systems  Comprehensive review of system performed- negative except noted in HPI       Objective:   Vitals:    01/04/23 1429   BP: 116/80   BP Location: Left arm   Patient Position: Sitting   Pulse: 72   Resp: 18   SpO2: 98%   Height: 5' 9" (1.753 m)    Body mass index is 28.5 kg/m².  Physical Exam  Vitals and nursing note reviewed.   Constitutional:       General: She is not in acute distress.     Appearance: Normal appearance.   HENT:      Head: " Normocephalic and atraumatic.      Mouth/Throat:      Mouth: Mucous membranes are moist.   Eyes:      Extraocular Movements: Extraocular movements intact.      Conjunctiva/sclera: Conjunctivae normal.   Cardiovascular:      Rate and Rhythm: Normal rate.   Pulmonary:      Effort: Pulmonary effort is normal.   Musculoskeletal:         General: No swelling, tenderness, deformity or signs of injury. Normal range of motion.      Cervical back: Normal range of motion.   Skin:     General: Skin is warm and dry.   Neurological:      General: No focal deficit present.      Mental Status: She is alert and oriented to person, place, and time. Mental status is at baseline.         Assessment & Plan  1. Elbow pain, right  -     Ambulatory referral/consult to Orthopedics; Future; Expected date: 01/11/2023        -     Voltaren gel up to 4 times daily as needed. Patient does not want to take much NSAIDs due to elevated kidney function.     2. Neck pain  -     X-Ray Cervical Spine Complete 5 view; Future; Expected date: 01/04/2023     RTC as needed

## 2023-01-11 ENCOUNTER — TELEPHONE (OUTPATIENT)
Dept: PRIMARY CARE CLINIC | Facility: CLINIC | Age: 48
End: 2023-01-11
Payer: COMMERCIAL

## 2023-01-11 ENCOUNTER — OFFICE VISIT (OUTPATIENT)
Dept: ORTHOPEDICS | Facility: CLINIC | Age: 48
End: 2023-01-11
Payer: COMMERCIAL

## 2023-01-11 ENCOUNTER — HOSPITAL ENCOUNTER (OUTPATIENT)
Dept: RADIOLOGY | Facility: CLINIC | Age: 48
Discharge: HOME | End: 2023-01-11
Attending: REHABILITATION UNIT
Payer: COMMERCIAL

## 2023-01-11 VITALS
DIASTOLIC BLOOD PRESSURE: 82 MMHG | HEIGHT: 68 IN | HEART RATE: 87 BPM | BODY MASS INDEX: 29.55 KG/M2 | SYSTOLIC BLOOD PRESSURE: 115 MMHG | WEIGHT: 195 LBS

## 2023-01-11 DIAGNOSIS — M25.521 RIGHT ELBOW PAIN: ICD-10-CM

## 2023-01-11 DIAGNOSIS — M77.11 LATERAL EPICONDYLITIS OF RIGHT ELBOW: Primary | ICD-10-CM

## 2023-01-11 DIAGNOSIS — M25.521 ELBOW PAIN, RIGHT: ICD-10-CM

## 2023-01-11 PROCEDURE — 73080 XR ELBOW COMPLETE 3 VIEW RIGHT: ICD-10-PCS | Mod: RT,,, | Performed by: REHABILITATION UNIT

## 2023-01-11 PROCEDURE — 20551 NJX 1 TENDON ORIGIN/INSJ: CPT | Mod: RT,,, | Performed by: REHABILITATION UNIT

## 2023-01-11 PROCEDURE — 3079F DIAST BP 80-89 MM HG: CPT | Mod: CPTII,,, | Performed by: REHABILITATION UNIT

## 2023-01-11 PROCEDURE — 99204 OFFICE O/P NEW MOD 45 MIN: CPT | Mod: 25,,, | Performed by: REHABILITATION UNIT

## 2023-01-11 PROCEDURE — 20551 TENDON ORIGIN: ICD-10-PCS | Mod: RT,,, | Performed by: REHABILITATION UNIT

## 2023-01-11 PROCEDURE — 3008F BODY MASS INDEX DOCD: CPT | Mod: CPTII,,, | Performed by: REHABILITATION UNIT

## 2023-01-11 PROCEDURE — 73080 X-RAY EXAM OF ELBOW: CPT | Mod: RT,,, | Performed by: REHABILITATION UNIT

## 2023-01-11 PROCEDURE — 3079F PR MOST RECENT DIASTOLIC BLOOD PRESSURE 80-89 MM HG: ICD-10-PCS | Mod: CPTII,,, | Performed by: REHABILITATION UNIT

## 2023-01-11 PROCEDURE — 99204 PR OFFICE/OUTPT VISIT, NEW, LEVL IV, 45-59 MIN: ICD-10-PCS | Mod: 25,,, | Performed by: REHABILITATION UNIT

## 2023-01-11 PROCEDURE — 3008F PR BODY MASS INDEX (BMI) DOCUMENTED: ICD-10-PCS | Mod: CPTII,,, | Performed by: REHABILITATION UNIT

## 2023-01-11 PROCEDURE — 3074F SYST BP LT 130 MM HG: CPT | Mod: CPTII,,, | Performed by: REHABILITATION UNIT

## 2023-01-11 PROCEDURE — 3074F PR MOST RECENT SYSTOLIC BLOOD PRESSURE < 130 MM HG: ICD-10-PCS | Mod: CPTII,,, | Performed by: REHABILITATION UNIT

## 2023-01-11 RX ORDER — DICLOFENAC SODIUM 75 MG/1
75 TABLET, DELAYED RELEASE ORAL 2 TIMES DAILY
Qty: 28 TABLET | Refills: 0 | Status: SHIPPED | OUTPATIENT
Start: 2023-01-11 | End: 2023-01-25

## 2023-01-11 RX ADMIN — LIDOCAINE HYDROCHLORIDE 1 ML: 20 INJECTION, SOLUTION INFILTRATION; PERINEURAL at 09:01

## 2023-01-11 RX ADMIN — BETAMETHASONE SODIUM PHOSPHATE AND BETAMETHASONE ACETATE 6 MG: 3; 3 INJECTION, SUSPENSION INTRA-ARTICULAR; INTRALESIONAL; INTRAMUSCULAR; SOFT TISSUE at 09:01

## 2023-01-11 NOTE — PROGRESS NOTES
"  Subjective:      Patient ID: Carmita Menjivar is a 47 y.o. female.    Chief Complaint: Pain of the Right Elbow and Elbow Pain (started noticing some right elbow pain in August and has continued since then, thinks it is an overuse from working out and hanging sheet rock in her camp house, has gotten an xray of neck and gone to PT, does have some numbness and tingling in hand)    HPI:   Carmita Menjivar is a 47 y.o. female who presents today for initial evaluation of right elbow pain. She reports pain started this past fall in August. She has been active with labor at her camp house. She also reports neck pain. She has done PT. She has tried OTC meds. She also reports some sensory changes into her hand. She has seen her PCP. She has tried voltaren gel and had C spine Xrs. Pain worse with activity and somewhat better at rest.     Past Medical History:   Diagnosis Date    Known health problems: none      Past Surgical History:   Procedure Laterality Date    ABLATION      Uterine    TONSILLECTOMY      TUBAL LIGATION       Social History     Socioeconomic History    Marital status:    Tobacco Use    Smoking status: Former     Types: Cigarettes     Quit date:      Years since quittin.0    Smokeless tobacco: Never   Substance and Sexual Activity    Alcohol use: Yes     Comment: 3 times per week     Drug use: Never    Sexual activity: Yes     Partners: Male         Current Outpatient Medications:     mecobalamin (B12 ACTIVE ORAL), Take by mouth., Disp: , Rfl:     melatonin 10 mg Tab, Take 20 mg by mouth every evening., Disp: , Rfl:     multivitamin (THERAGRAN) per tablet, Take 1 tablet by mouth once daily., Disp: , Rfl:   Review of patient's allergies indicates:   Allergen Reactions    Mangosteen (garcinia mangostana) Rash     Mangos        /82   Pulse 87   Ht 5' 8" (1.727 m)   Wt 88.5 kg (195 lb)   LMP  (LMP Unknown)   BMI 29.65 kg/m²     Comprehensive review of systems completed and negative " except as per HPI.        Objective:   Head: Normocephalic, without obvious abnormality, atraumatic  Eyes: conjunctivae/corneas clear. EOM's intact  Ears: normal external appearance  Nose: Nares normal. Septum midline. Mucosa normal. No drainage  Throat: normal findings: lips normal without lesions  Lungs: unlabored breathing on room air  Chest wall: symmetric chest rise  Heart: regular rate and rhythm  Pulses: 2+ and symmetric  Skin: Skin color, texture, turgor normal. No rashes or lesions  Neurologic: Grossly normal    right elbow    Appearance:   normal    Cervical Spine: no ttp; full, painless ROM; negative Spurlings    Tenderness:   Lateral epicondyle    AROM:   full    PROM:  same    right Elbow    Tenderness: Lateral epicondyle       Range of Motion:      Flexion: Normal     Extension: Normal     Pronation: Normal     Supination: Normal        Strength      Wrist Extension 5/5     Wrist Flexion 5/5      5/5     Pronation 5/5     Supination 5/5       Varus Stress Test Negative   Valgus Stress Test Negative   Tinels Cubital Tunnel Negative       Pulses: Palpable radial pulse    Neurological deficits: None    The patient has a warm and well-perfused upper extremity with capillary refill less than 2 seconds. Sensation is intact to light touch in terminal nerve distributions. 5/5 ain/pin/uln. The patient has no palpable epitrochlear lymphadenopathy.      Assessment:     Imaging: 3 views of right elbow obtained today personally reviewed showing no acute fractures or dislocations. Joint spaces intact.     Tendon Origin    Date/Time: 1/11/2023 9:30 AM  Performed by: eSveriano Gallardo MD  Authorized by: Severiano Gallardo MD     Timeout: prior to procedure the correct patient, procedure, and site was verified    Indications:  Pain  Site marked: the procedure site was marked    Timeout: prior to procedure the correct patient, procedure, and site was verified    Location:  Elbow  Prep: patient was prepped and draped  in usual sterile fashion    Needle size:  24 G  Approach: lateral.  Medications:  1 mL LIDOcaine HCL 20 mg/ml (2%) 20 mg/mL (2 %); 6 mg betamethasone acetate-betamethasone sodium phosphate 6 mg/mL  Patient tolerance:  Patient tolerated the procedure well with no immediate complications          1. Lateral epicondylitis of right elbow    2. Right elbow pain    3. Elbow pain, right          Plan:       Orders Placed This Encounter    X-Ray Elbow Complete 3 views Right     Imaging and exam findings discussed. She does have tennis elbow clinically. We discussed options and proceeded with CSI as above. Will also have her try an elbow strap for this. She does have some sensory changes and will send her for EMG/NCS to further delineate. [place]ac. PT at [facility]. I will see her back in 6-8 weeks for elbow and study results. All of her questions were answered and she was in agreement.

## 2023-01-11 NOTE — TELEPHONE ENCOUNTER
----- Message from Pau Rogel sent at 1/11/2023 11:09 AM CST -----  Regarding: insurance information for referral  Type:  Needs Medical Advice    Who Called: Desire with Dr. Morgan Ortega's Office  Would the patient rather a call back or a response via [facility]? Call back  Best Call Back Number: [phone]  Additional Information: Desire stated that they received a referral for the above patient.  She needs the patients insurance information fax to [facility]...Please fax information to [phone]

## 2023-01-19 ENCOUNTER — PATIENT MESSAGE (OUTPATIENT)
Dept: PRIMARY CARE CLINIC | Facility: CLINIC | Age: 48
End: 2023-01-19
Payer: COMMERCIAL

## 2023-01-23 ENCOUNTER — PATIENT MESSAGE (OUTPATIENT)
Dept: ADMINISTRATIVE | Facility: HOSPITAL | Age: 48
End: 2023-01-23
Payer: COMMERCIAL

## 2023-01-24 NOTE — TELEPHONE ENCOUNTER
[facility] is the best option for spine ortho. Our group only has a spine ortho physician here once a week or once a month.

## 2023-01-27 ENCOUNTER — OFFICE VISIT (OUTPATIENT)
Dept: PAIN MEDICINE | Facility: CLINIC | Age: 48
End: 2023-01-27
Payer: COMMERCIAL

## 2023-01-27 VITALS
DIASTOLIC BLOOD PRESSURE: 88 MMHG | SYSTOLIC BLOOD PRESSURE: 120 MMHG | HEIGHT: 68 IN | HEART RATE: 83 BPM | WEIGHT: 190 LBS | BODY MASS INDEX: 28.79 KG/M2

## 2023-01-27 DIAGNOSIS — M54.12 CERVICAL RADICULITIS: ICD-10-CM

## 2023-01-27 DIAGNOSIS — M50.30 DDD (DEGENERATIVE DISC DISEASE), CERVICAL: ICD-10-CM

## 2023-01-27 DIAGNOSIS — M54.2 CERVICALGIA: Primary | ICD-10-CM

## 2023-01-27 PROCEDURE — 3008F BODY MASS INDEX DOCD: CPT | Mod: CPTII,,, | Performed by: ANESTHESIOLOGY

## 2023-01-27 PROCEDURE — 3074F SYST BP LT 130 MM HG: CPT | Mod: CPTII,,, | Performed by: ANESTHESIOLOGY

## 2023-01-27 PROCEDURE — 3079F PR MOST RECENT DIASTOLIC BLOOD PRESSURE 80-89 MM HG: ICD-10-PCS | Mod: CPTII,,, | Performed by: ANESTHESIOLOGY

## 2023-01-27 PROCEDURE — 3074F PR MOST RECENT SYSTOLIC BLOOD PRESSURE < 130 MM HG: ICD-10-PCS | Mod: CPTII,,, | Performed by: ANESTHESIOLOGY

## 2023-01-27 PROCEDURE — 1160F RVW MEDS BY RX/DR IN RCRD: CPT | Mod: CPTII,,, | Performed by: ANESTHESIOLOGY

## 2023-01-27 PROCEDURE — 1159F MED LIST DOCD IN RCRD: CPT | Mod: CPTII,,, | Performed by: ANESTHESIOLOGY

## 2023-01-27 PROCEDURE — 1159F PR MEDICATION LIST DOCUMENTED IN MEDICAL RECORD: ICD-10-PCS | Mod: CPTII,,, | Performed by: ANESTHESIOLOGY

## 2023-01-27 PROCEDURE — 99203 OFFICE O/P NEW LOW 30 MIN: CPT | Mod: ,,, | Performed by: ANESTHESIOLOGY

## 2023-01-27 PROCEDURE — 1160F PR REVIEW ALL MEDS BY PRESCRIBER/CLIN PHARMACIST DOCUMENTED: ICD-10-PCS | Mod: CPTII,,, | Performed by: ANESTHESIOLOGY

## 2023-01-27 PROCEDURE — 3079F DIAST BP 80-89 MM HG: CPT | Mod: CPTII,,, | Performed by: ANESTHESIOLOGY

## 2023-01-27 PROCEDURE — 99203 PR OFFICE/OUTPT VISIT, NEW, LEVL III, 30-44 MIN: ICD-10-PCS | Mod: ,,, | Performed by: ANESTHESIOLOGY

## 2023-01-27 PROCEDURE — 3008F PR BODY MASS INDEX (BMI) DOCUMENTED: ICD-10-PCS | Mod: CPTII,,, | Performed by: ANESTHESIOLOGY

## 2023-01-27 RX ORDER — IBUPROFEN 200 MG
200 TABLET ORAL EVERY 6 HOURS PRN
COMMUNITY

## 2023-01-27 RX ORDER — DICLOFENAC SODIUM 75 MG/1
75 TABLET, DELAYED RELEASE ORAL 2 TIMES DAILY
COMMUNITY
End: 2023-10-04 | Stop reason: ALTCHOICE

## 2023-01-27 RX ORDER — CYCLOBENZAPRINE HCL 10 MG
5 TABLET ORAL 3 TIMES DAILY PRN
COMMUNITY
End: 2023-04-06 | Stop reason: SDUPTHER

## 2023-01-27 NOTE — PROGRESS NOTES
Adore Goodson MD        PATIENT NAME: Carmita Menjivar  : 1975  DATE: 23  MRN: [id number]      Billing Provider: Adore Goodson MD  Level of Service:   Patient PCP Information       Provider PCP Type    Cam Yoel Acosta MD General            Reason for Visit / Chief Complaint: Referral (Referral for neck pain. Taking prescription medications for pain. Pain is a 8/10 on worse day. States she has tried exercise and PT for the pain ion November. She states she felt worse after PT. States had a cortisone injection about 3 weeks ago which helped with the pain in her elbow which help with her numbness and tingling in her right arm. But the pain is still there. She is also having pain in her right shoulder and cheek that radiates to her hands.)       Update PCP  Update Chief Complaint         History of Present Illness / Problem Focused Workflow     Carmita Menjivar presents to the clinic with Referral (Referral for neck pain. Taking prescription medications for pain. Pain is a 8/10 on worse day. States she has tried exercise and PT for the pain ion November. She states she felt worse after PT. States had a cortisone injection about 3 weeks ago which helped with the pain in her elbow which help with her numbness and tingling in her right arm. But the pain is still there. She is also having pain in her right shoulder and cheek that radiates to her hands.)     This is a 47-year-old female who presents to clinic today for her initial consultation with complaints of neck pain radiating into the upper extremities.  She states that she is had neck pain and tightness in the past, but it has been getting worse over the past few months.  She states that she and her  were building a camp and she was doing activity such as hanging she brought, which seemed to start making her pain worse in 2022.  She did not do much activity in September and noticed that she felt a little bit better.  However when  she resumed activities, her pain started to increase again.  She went to physical therapy and had been noticing increased pain around her right elbow.  She is currently still in physical therapy and having dry needling done.  She had been referred to orthopedics for a steroid injection in her right elbow, which has been helping some.  Prior to that injection, she was noticing numbness and tingling radiating into the 4th and 5th digits of her right hand.  She is now having pain in the left side of her neck radiating down the left upper extremity to her hand.  She is also experiencing burning, numbness, and tingling in the left arm.  She states that massages help only while it is being done.  She is scheduled to have an EMG with Dr. Ortega on February 14th.  She currently rates her pain as 6/10 on the NRS.  It gets up to 8/10 at worst and down to 3/10 at best.  She uses ibuprofen for headaches, and is also on diclofenac and Flexeril as needed.  Over-the-counter topical pain relievers had not helped.  She is right-handed        Review of Systems     Review of Systems   Musculoskeletal:  Positive for neck pain and neck stiffness.   Neurological:  Positive for numbness.   All other systems reviewed and are negative.     Medical / Social / Family History     Past Medical History:   Diagnosis Date    Known health problems: none        Past Surgical History:   Procedure Laterality Date    ABLATION      Uterine    TONSILLECTOMY      TUBAL LIGATION         Social History  Ms. Menjivar  reports that she quit smoking about 17 years ago. Her smoking use included cigarettes. She has never used smokeless tobacco. She reports current alcohol use. She reports that she does not use drugs.    Family History  Ms.'s Menjivar family history includes Brain cancer in her mother; Coronary artery disease in her father; Heart disease in her father; Melanoma in her mother; Prostate cancer in her father.    Medications and Allergies      Medications  Outpatient Medications Marked as Taking for the 1/27/23 encounter (Office Visit) with Adore Goodson MD   Medication Sig Dispense Refill    cyclobenzaprine (FLEXERIL) 10 MG tablet Take 5 mg by mouth 3 (three) times daily as needed for Muscle spasms.      diclofenac (VOLTAREN) 75 MG EC tablet Take 75 mg by mouth 2 (two) times daily.      ibuprofen (ADVIL,MOTRIN) 200 MG tablet Take 200 mg by mouth every 6 (six) hours as needed for Pain.      mecobalamin (B12 ACTIVE ORAL) Take by mouth.      melatonin 10 mg Tab Take 20 mg by mouth every evening.      multivitamin (THERAGRAN) per tablet Take 1 tablet by mouth once daily.         Allergies  Review of patient's allergies indicates:   Allergen Reactions    Mangosteen (garcinia mangostana) Rash     Mangos        Physical Examination     Vitals:    01/27/23 1012   BP: 120/88   Pulse: 83     Spine Musculoskeletal Exam    Gait    Gait is normal.    Inspection    Cervical Spine    Cervical spine inspection is normal.    Palpation    Cervical Spine    Cervical spine palpation is normal.    Range of Motion    Cervical Spine    Cervical spine range of motion is normal.         Strength    Cervical Spine    Cervical spine motor exam is normal.    Sensory    Cervical Spine    Cervical spine sensation is normal.    Neurovascular    Cervical Spine    Cervical spine neurovascular exam is normal.    General      Constitutional: appears stated age, well-developed and well-nourished    Scleral icterus: no    Labored breathing: no    Psychiatric: normal mood and affect and no acute distress    Neurological: alert and oriented x3    Skin: intact    Lymphadenopathy: none     Assessment and Plan (including Health Maintenance)      Problem List  Smart Sets  Document Outside HM   :    Plan:   Cervicalgia    Cervical radiculitis    DDD (degenerative disc disease), cervical           Problem List Items Addressed This Visit       Cervicalgia - Primary    Cervical radiculitis     DDD (degenerative disc disease), cervical         Future Appointments   Date Time Provider Department Center   3/8/2023  9:00 AM Severiano Gallardo MD [facility] [place]   5/30/2023  1:00 PM [facility]        There are no Patient Instructions on file for this visit.  No follow-ups on file.     Signature:  Adore Goodson MD      Date of encounter: 1/27/23

## 2023-01-30 ENCOUNTER — PATIENT MESSAGE (OUTPATIENT)
Dept: PRIMARY CARE CLINIC | Facility: CLINIC | Age: 48
End: 2023-01-30
Payer: COMMERCIAL

## 2023-02-02 ENCOUNTER — OFFICE VISIT (OUTPATIENT)
Dept: PAIN MEDICINE | Facility: CLINIC | Age: 48
End: 2023-02-02
Payer: COMMERCIAL

## 2023-02-02 VITALS
TEMPERATURE: 98 F | WEIGHT: 190 LBS | HEIGHT: 68 IN | BODY MASS INDEX: 28.79 KG/M2 | DIASTOLIC BLOOD PRESSURE: 86 MMHG | HEART RATE: 80 BPM | SYSTOLIC BLOOD PRESSURE: 132 MMHG

## 2023-02-02 DIAGNOSIS — M54.12 CERVICAL RADICULITIS: ICD-10-CM

## 2023-02-02 DIAGNOSIS — M50.30 DDD (DEGENERATIVE DISC DISEASE), CERVICAL: ICD-10-CM

## 2023-02-02 DIAGNOSIS — M54.2 CERVICALGIA: ICD-10-CM

## 2023-02-02 DIAGNOSIS — M54.12 CERVICAL RADICULITIS: Primary | ICD-10-CM

## 2023-02-02 DIAGNOSIS — M48.02 FORAMINAL STENOSIS OF CERVICAL REGION: ICD-10-CM

## 2023-02-02 DIAGNOSIS — M50.30 DDD (DEGENERATIVE DISC DISEASE), CERVICAL: Primary | ICD-10-CM

## 2023-02-02 PROCEDURE — 99213 OFFICE O/P EST LOW 20 MIN: CPT | Mod: ,,, | Performed by: ANESTHESIOLOGY

## 2023-02-02 PROCEDURE — 3079F DIAST BP 80-89 MM HG: CPT | Mod: CPTII,,, | Performed by: ANESTHESIOLOGY

## 2023-02-02 PROCEDURE — 3075F PR MOST RECENT SYSTOLIC BLOOD PRESS GE 130-139MM HG: ICD-10-PCS | Mod: CPTII,,, | Performed by: ANESTHESIOLOGY

## 2023-02-02 PROCEDURE — 1160F PR REVIEW ALL MEDS BY PRESCRIBER/CLIN PHARMACIST DOCUMENTED: ICD-10-PCS | Mod: CPTII,,, | Performed by: ANESTHESIOLOGY

## 2023-02-02 PROCEDURE — 3075F SYST BP GE 130 - 139MM HG: CPT | Mod: CPTII,,, | Performed by: ANESTHESIOLOGY

## 2023-02-02 PROCEDURE — 3008F BODY MASS INDEX DOCD: CPT | Mod: CPTII,,, | Performed by: ANESTHESIOLOGY

## 2023-02-02 PROCEDURE — 99213 PR OFFICE/OUTPT VISIT, EST, LEVL III, 20-29 MIN: ICD-10-PCS | Mod: ,,, | Performed by: ANESTHESIOLOGY

## 2023-02-02 PROCEDURE — 1159F PR MEDICATION LIST DOCUMENTED IN MEDICAL RECORD: ICD-10-PCS | Mod: CPTII,,, | Performed by: ANESTHESIOLOGY

## 2023-02-02 PROCEDURE — 1160F RVW MEDS BY RX/DR IN RCRD: CPT | Mod: CPTII,,, | Performed by: ANESTHESIOLOGY

## 2023-02-02 PROCEDURE — 3079F PR MOST RECENT DIASTOLIC BLOOD PRESSURE 80-89 MM HG: ICD-10-PCS | Mod: CPTII,,, | Performed by: ANESTHESIOLOGY

## 2023-02-02 PROCEDURE — 1159F MED LIST DOCD IN RCRD: CPT | Mod: CPTII,,, | Performed by: ANESTHESIOLOGY

## 2023-02-02 PROCEDURE — 3008F PR BODY MASS INDEX (BMI) DOCUMENTED: ICD-10-PCS | Mod: CPTII,,, | Performed by: ANESTHESIOLOGY

## 2023-02-02 NOTE — PROGRESS NOTES
Adore Goodson MD        PATIENT NAME: Carmita Menjivar  : 1975  DATE: 23  MRN: [id number]      Billing Provider: Adore Goodson MD  Level of Service:   Patient PCP Information       Provider PCP Type    Cam Yoel Acosta MD General            Reason for Visit / Chief Complaint: Results (Results for C-Spine MRI, pain level 6/10. )       Update PCP  Update Chief Complaint         History of Present Illness / Problem Focused Workflow     Carmita Menjivar presents to the clinic with Results (Results for C-Spine MRI, pain level 6/10. )     This is a 47-year-old female who presents to clinic today for follow-up of neck pain radiating into the upper extremities.  She states that she is had neck pain and tightness in the past, but it has been getting worse over the past few months.  She states that she and her  were building a camp and she was doing activity such as hanging she brought, which seemed to start making her pain worse in 2022.  She did not do much activity in September and noticed that she felt a little bit better.  However when she resumed activities, her pain started to increase again.  She went to physical therapy and had been noticing increased pain around her right elbow.  She is currently still in physical therapy and having dry needling done.  She had been referred to orthopedics for a steroid injection in her right elbow, which has been helping some.  Prior to that injection, she was noticing numbness and tingling radiating into the 4th and 5th digits of her right hand.  She is now having pain in the left side of her neck radiating down the left upper extremity to her hand.  She is also experiencing burning, numbness, and tingling in the left arm.  She states that massages help only while it is being done.  She is scheduled to have an EMG with Dr. Ortega on .  She currently rates her pain as 6/10 on the NRS.  It gets up to 8/10 at worst and down to 3/10 at  best.  She uses ibuprofen for headaches, and is also on diclofenac and Flexeril as needed.  Over-the-counter topical pain relievers had not helped.  She is right-handed.  She was seen here for her initial consultation last week, at which time I ordered an MRI of the cervical spine without contrast for further evaluation of her symptoms.  She is here today to discuss the results.        Review of Systems     Review of Systems   Musculoskeletal:  Positive for neck pain and neck stiffness.   Neurological:  Positive for numbness.   All other systems reviewed and are negative.     Medical / Social / Family History     Past Medical History:   Diagnosis Date    Known health problems: none        Past Surgical History:   Procedure Laterality Date    ABLATION      Uterine    TONSILLECTOMY      TUBAL LIGATION         Social History  Ms. Menjivar  reports that she quit smoking about 17 years ago. Her smoking use included cigarettes. She has never used smokeless tobacco. She reports current alcohol use. She reports that she does not use drugs.    Family History  Ms.'s Menjivar family history includes Brain cancer in her mother; Coronary artery disease in her father; Heart disease in her father; Melanoma in her mother; Prostate cancer in her father.    Medications and Allergies     Medications  Outpatient Medications Marked as Taking for the 2/2/23 encounter (Office Visit) with Adore Goodson MD   Medication Sig Dispense Refill    cyclobenzaprine (FLEXERIL) 10 MG tablet Take 5 mg by mouth 3 (three) times daily as needed for Muscle spasms.      diclofenac (VOLTAREN) 75 MG EC tablet Take 75 mg by mouth 2 (two) times daily.      ibuprofen (ADVIL,MOTRIN) 200 MG tablet Take 200 mg by mouth every 6 (six) hours as needed for Pain.      mecobalamin (B12 ACTIVE ORAL) Take by mouth.      melatonin 10 mg Tab Take 20 mg by mouth every evening.      multivitamin (THERAGRAN) per tablet Take 1 tablet by mouth once daily.          Allergies  Review of patient's allergies indicates:   Allergen Reactions    Mangosteen (garcinia mangostana) Rash     Mangos        Physical Examination     Vitals:    02/02/23 1347   BP: 132/86   Pulse: 80   Temp: 98.4 °F (36.9 °C)     Spine Musculoskeletal Exam    Gait    Gait is normal.    Inspection    Cervical Spine    Cervical spine inspection is normal.    Palpation    Cervical Spine    Cervical spine palpation is normal.    Range of Motion    Cervical Spine    Cervical spine range of motion is normal.         Strength    Cervical Spine    Cervical spine motor exam is normal.    Sensory    Cervical Spine    Cervical spine sensation is normal.    Neurovascular    Cervical Spine    Cervical spine neurovascular exam is normal.    General      Constitutional: appears stated age, well-developed and well-nourished    Scleral icterus: no    Labored breathing: no    Psychiatric: normal mood and affect and no acute distress    Neurological: alert and oriented x3    Skin: intact    Lymphadenopathy: none     Assessment and Plan (including Health Maintenance)      Problem List  Smart Sets  Document Outside HM   :    Plan:   DDD (degenerative disc disease), cervical    Cervical radiculitis    Cervicalgia    Foraminal stenosis of cervical region     We have discussed the results of her MRI of the cervical spine today.  She does have rather severe foraminal stenosis at C6-7, which explains the radicular pain in the bilateral upper extremities down to her hands.  She has already tried a variety of conservative treatments including physical therapy, dry needling, massage therapy, and taking NSAIDs.  Her symptoms have not resolved.  I am scheduling her for a cervical epidural steroid injection today.  The plan was discussed with the patient and she wishes to proceed.    Problem List Items Addressed This Visit       Cervicalgia    Cervical radiculitis    DDD (degenerative disc disease), cervical - Primary    Foraminal  stenosis of cervical region         Future Appointments   Date Time Provider Department Center   3/8/2023  9:00 AM Severiano Gallardo MD [facility] [place]   5/30/2023  1:00 PM Celeste Acosta MD [facility]        There are no Patient Instructions on file for this visit.  No follow-ups on file.     Signature:  Adore Goodson MD      Date of encounter: 2/2/23

## 2023-02-02 NOTE — H&P (VIEW-ONLY)
Adore Goodson MD        PATIENT NAME: Carmita Menjivar  : 1975  DATE: 23  MRN: [id number]      Billing Provider: Adore Goodson MD  Level of Service:   Patient PCP Information       Provider PCP Type    Cam Yoel Acosta MD General            Reason for Visit / Chief Complaint: Results (Results for C-Spine MRI, pain level 6/10. )       Update PCP  Update Chief Complaint         History of Present Illness / Problem Focused Workflow     Carmita Menjivar presents to the clinic with Results (Results for C-Spine MRI, pain level 6/10. )     This is a 47-year-old female who presents to clinic today for follow-up of neck pain radiating into the upper extremities.  She states that she is had neck pain and tightness in the past, but it has been getting worse over the past few months.  She states that she and her  were building a camp and she was doing activity such as hanging she brought, which seemed to start making her pain worse in 2022.  She did not do much activity in September and noticed that she felt a little bit better.  However when she resumed activities, her pain started to increase again.  She went to physical therapy and had been noticing increased pain around her right elbow.  She is currently still in physical therapy and having dry needling done.  She had been referred to orthopedics for a steroid injection in her right elbow, which has been helping some.  Prior to that injection, she was noticing numbness and tingling radiating into the 4th and 5th digits of her right hand.  She is now having pain in the left side of her neck radiating down the left upper extremity to her hand.  She is also experiencing burning, numbness, and tingling in the left arm.  She states that massages help only while it is being done.  She is scheduled to have an EMG with Dr. Ortega on .  She currently rates her pain as 6/10 on the NRS.  It gets up to 8/10 at worst and down to 3/10 at  best.  She uses ibuprofen for headaches, and is also on diclofenac and Flexeril as needed.  Over-the-counter topical pain relievers had not helped.  She is right-handed.  She was seen here for her initial consultation last week, at which time I ordered an MRI of the cervical spine without contrast for further evaluation of her symptoms.  She is here today to discuss the results.        Review of Systems     Review of Systems   Musculoskeletal:  Positive for neck pain and neck stiffness.   Neurological:  Positive for numbness.   All other systems reviewed and are negative.     Medical / Social / Family History     Past Medical History:   Diagnosis Date    Known health problems: none        Past Surgical History:   Procedure Laterality Date    ABLATION      Uterine    TONSILLECTOMY      TUBAL LIGATION         Social History  Ms. Menjivar  reports that she quit smoking about 17 years ago. Her smoking use included cigarettes. She has never used smokeless tobacco. She reports current alcohol use. She reports that she does not use drugs.    Family History  Ms.'s Menjivar family history includes Brain cancer in her mother; Coronary artery disease in her father; Heart disease in her father; Melanoma in her mother; Prostate cancer in her father.    Medications and Allergies     Medications  Outpatient Medications Marked as Taking for the 2/2/23 encounter (Office Visit) with Adore Goodson MD   Medication Sig Dispense Refill    cyclobenzaprine (FLEXERIL) 10 MG tablet Take 5 mg by mouth 3 (three) times daily as needed for Muscle spasms.      diclofenac (VOLTAREN) 75 MG EC tablet Take 75 mg by mouth 2 (two) times daily.      ibuprofen (ADVIL,MOTRIN) 200 MG tablet Take 200 mg by mouth every 6 (six) hours as needed for Pain.      mecobalamin (B12 ACTIVE ORAL) Take by mouth.      melatonin 10 mg Tab Take 20 mg by mouth every evening.      multivitamin (THERAGRAN) per tablet Take 1 tablet by mouth once daily.          Allergies  Review of patient's allergies indicates:   Allergen Reactions    Mangosteen (garcinia mangostana) Rash     Mangos        Physical Examination     Vitals:    02/02/23 1347   BP: 132/86   Pulse: 80   Temp: 98.4 °F (36.9 °C)     Spine Musculoskeletal Exam    Gait    Gait is normal.    Inspection    Cervical Spine    Cervical spine inspection is normal.    Palpation    Cervical Spine    Cervical spine palpation is normal.    Range of Motion    Cervical Spine    Cervical spine range of motion is normal.         Strength    Cervical Spine    Cervical spine motor exam is normal.    Sensory    Cervical Spine    Cervical spine sensation is normal.    Neurovascular    Cervical Spine    Cervical spine neurovascular exam is normal.    General      Constitutional: appears stated age, well-developed and well-nourished    Scleral icterus: no    Labored breathing: no    Psychiatric: normal mood and affect and no acute distress    Neurological: alert and oriented x3    Skin: intact    Lymphadenopathy: none     Assessment and Plan (including Health Maintenance)      Problem List  Smart Sets  Document Outside HM   :    Plan:   DDD (degenerative disc disease), cervical    Cervical radiculitis    Cervicalgia    Foraminal stenosis of cervical region     We have discussed the results of her MRI of the cervical spine today.  She does have rather severe foraminal stenosis at C6-7, which explains the radicular pain in the bilateral upper extremities down to her hands.  She has already tried a variety of conservative treatments including physical therapy, dry needling, massage therapy, and taking NSAIDs.  Her symptoms have not resolved.  I am scheduling her for a cervical epidural steroid injection today.  The plan was discussed with the patient and she wishes to proceed.    Problem List Items Addressed This Visit       Cervicalgia    Cervical radiculitis    DDD (degenerative disc disease), cervical - Primary    Foraminal  stenosis of cervical region         Future Appointments   Date Time Provider Department Center   3/8/2023  9:00 AM Severiano Gallardo MD [facility] [place]   5/30/2023  1:00 PM Celeste Acosta MD [facility]        There are no Patient Instructions on file for this visit.  No follow-ups on file.     Signature:  Adore Goodson MD      Date of encounter: 2/2/23

## 2023-02-03 RX ORDER — NICOTINE POLACRILEX 2 MG
1 GUM BUCCAL DAILY
COMMUNITY

## 2023-02-07 ENCOUNTER — ANESTHESIA EVENT (OUTPATIENT)
Dept: SURGERY | Facility: HOSPITAL | Age: 48
End: 2023-02-07
Payer: COMMERCIAL

## 2023-02-12 ENCOUNTER — PATIENT MESSAGE (OUTPATIENT)
Dept: ADMINISTRATIVE | Facility: OTHER | Age: 48
End: 2023-02-12
Payer: COMMERCIAL

## 2023-02-13 ENCOUNTER — PATIENT MESSAGE (OUTPATIENT)
Dept: ADMINISTRATIVE | Facility: OTHER | Age: 48
End: 2023-02-13
Payer: COMMERCIAL

## 2023-02-14 RX ORDER — SODIUM CHLORIDE, SODIUM GLUCONATE, SODIUM ACETATE, POTASSIUM CHLORIDE AND MAGNESIUM CHLORIDE 30; 37; 368; 526; 502 MG/100ML; MG/100ML; MG/100ML; MG/100ML; MG/100ML
INJECTION, SOLUTION INTRAVENOUS CONTINUOUS
Status: CANCELLED | OUTPATIENT
Start: 2023-02-14 | End: 2023-03-16

## 2023-02-14 RX ORDER — ONDANSETRON 2 MG/ML
4 INJECTION INTRAMUSCULAR; INTRAVENOUS DAILY PRN
Status: CANCELLED | OUTPATIENT
Start: 2023-02-14

## 2023-02-14 NOTE — ANESTHESIA PREPROCEDURE EVALUATION
02/14/2023  Carmita Menjivar is a 47 y.o., female with ----------------------------  Known health problems: none  Pain    And ----------------------------  Ablation      Comment:  Uterine  Tonsillectomy  Tubal ligation    Presents for cervical VERONICA.  Has had previous Lumbar VERONICA about 10 years ago without issues      Pre-op Assessment    I have reviewed the NPO Status.      Review of Systems      Physical Exam  General: Well nourished, Cooperative, Alert and Oriented    Airway:  Mallampati: II   Mouth Opening: Normal  TM Distance: Normal  Tongue: Normal  Neck ROM: Extension Painful    Dental:  Intact    Chest/Lungs:  Clear to auscultation, Normal Respiratory Rate    Heart:  Rate: Normal  Rhythm: Regular Rhythm        Anesthesia Plan  Type of Anesthesia, risks & benefits discussed:    Anesthesia Type: Gen Natural Airway  Intra-op Monitoring Plan: Standard ASA Monitors  Post Op Pain Control Plan: IV/PO Opioids PRN  Induction:  IV  Airway Plan: Direct  Informed Consent: Informed consent signed with the Patient and all parties understand the risks and agree with anesthesia plan.  All questions answered. Patient consented to blood products? No  ASA Score: 1  Day of Surgery Review of History & Physical: H&P Update referred to the surgeon/provider.  Anesthesia Plan Notes: Nasal cannula vs facemask supplemental oxygenation   For patients with SIMON/obesity, may consider SuperNoval Nasal CPAP      Ready For Surgery From Anesthesia Perspective.     .

## 2023-02-15 ENCOUNTER — ANESTHESIA (OUTPATIENT)
Dept: SURGERY | Facility: HOSPITAL | Age: 48
End: 2023-02-15
Payer: COMMERCIAL

## 2023-02-15 ENCOUNTER — HOSPITAL ENCOUNTER (OUTPATIENT)
Facility: HOSPITAL | Age: 48
Discharge: HOME | End: 2023-02-15
Attending: ANESTHESIOLOGY | Admitting: ANESTHESIOLOGY
Payer: COMMERCIAL

## 2023-02-15 DIAGNOSIS — G89.29 CHRONIC NECK PAIN: ICD-10-CM

## 2023-02-15 DIAGNOSIS — M54.2 CERVICALGIA: Primary | ICD-10-CM

## 2023-02-15 DIAGNOSIS — M54.2 CHRONIC NECK PAIN: ICD-10-CM

## 2023-02-15 PROCEDURE — 25000003 PHARM REV CODE 250: Performed by: STUDENT IN AN ORGANIZED HEALTH CARE EDUCATION/TRAINING PROGRAM

## 2023-02-15 PROCEDURE — 62321 NJX INTERLAMINAR CRV/THRC: CPT | Performed by: ANESTHESIOLOGY

## 2023-02-15 PROCEDURE — 62321 NJX INTERLAMINAR CRV/THRC: CPT | Mod: ,,, | Performed by: ANESTHESIOLOGY

## 2023-02-15 PROCEDURE — 37000008 HC ANESTHESIA 1ST 15 MINUTES: Performed by: ANESTHESIOLOGY

## 2023-02-15 PROCEDURE — A4216 STERILE WATER/SALINE, 10 ML: HCPCS | Performed by: ANESTHESIOLOGY

## 2023-02-15 PROCEDURE — 63600175 PHARM REV CODE 636 W HCPCS: Performed by: STUDENT IN AN ORGANIZED HEALTH CARE EDUCATION/TRAINING PROGRAM

## 2023-02-15 PROCEDURE — 63600175 PHARM REV CODE 636 W HCPCS: Performed by: ANESTHESIOLOGY

## 2023-02-15 PROCEDURE — 25000003 PHARM REV CODE 250: Performed by: ANESTHESIOLOGY

## 2023-02-15 PROCEDURE — 62321 PR INJ CERV/THORAC, W/GUIDANCE: ICD-10-PCS | Mod: ,,, | Performed by: ANESTHESIOLOGY

## 2023-02-15 RX ORDER — LIDOCAINE HYDROCHLORIDE 10 MG/ML
INJECTION, SOLUTION EPIDURAL; INFILTRATION; INTRACAUDAL; PERINEURAL
Status: DISCONTINUED
Start: 2023-02-15 | End: 2023-02-15 | Stop reason: HOSPADM

## 2023-02-15 RX ORDER — LIDOCAINE HYDROCHLORIDE 10 MG/ML
INJECTION, SOLUTION EPIDURAL; INFILTRATION; INTRACAUDAL; PERINEURAL
Status: DISCONTINUED | OUTPATIENT
Start: 2023-02-15 | End: 2023-02-15 | Stop reason: HOSPADM

## 2023-02-15 RX ORDER — SODIUM CHLORIDE, SODIUM GLUCONATE, SODIUM ACETATE, POTASSIUM CHLORIDE AND MAGNESIUM CHLORIDE 30; 37; 368; 526; 502 MG/100ML; MG/100ML; MG/100ML; MG/100ML; MG/100ML
INJECTION, SOLUTION INTRAVENOUS CONTINUOUS
Status: DISCONTINUED | OUTPATIENT
Start: 2023-02-15 | End: 2023-02-15 | Stop reason: HOSPADM

## 2023-02-15 RX ORDER — DEXAMETHASONE SODIUM PHOSPHATE 10 MG/ML
INJECTION INTRAMUSCULAR; INTRAVENOUS
Status: DISCONTINUED | OUTPATIENT
Start: 2023-02-15 | End: 2023-02-15 | Stop reason: HOSPADM

## 2023-02-15 RX ORDER — DEXAMETHASONE SODIUM PHOSPHATE 10 MG/ML
INJECTION INTRAMUSCULAR; INTRAVENOUS
Status: DISCONTINUED
Start: 2023-02-15 | End: 2023-02-15 | Stop reason: HOSPADM

## 2023-02-15 RX ORDER — BUPIVACAINE HYDROCHLORIDE 2.5 MG/ML
INJECTION, SOLUTION EPIDURAL; INFILTRATION; INTRACAUDAL
Status: DISCONTINUED
Start: 2023-02-15 | End: 2023-02-15 | Stop reason: WASHOUT

## 2023-02-15 RX ORDER — SODIUM CHLORIDE, SODIUM LACTATE, POTASSIUM CHLORIDE, CALCIUM CHLORIDE 600; 310; 30; 20 MG/100ML; MG/100ML; MG/100ML; MG/100ML
INJECTION, SOLUTION INTRAVENOUS CONTINUOUS
Status: DISCONTINUED | OUTPATIENT
Start: 2023-02-15 | End: 2023-02-15 | Stop reason: HOSPADM

## 2023-02-15 RX ORDER — PROPOFOL 10 MG/ML
VIAL (ML) INTRAVENOUS
Status: DISCONTINUED | OUTPATIENT
Start: 2023-02-15 | End: 2023-02-15

## 2023-02-15 RX ORDER — LIDOCAINE HYDROCHLORIDE 10 MG/ML
INJECTION, SOLUTION EPIDURAL; INFILTRATION; INTRACAUDAL; PERINEURAL
Status: DISCONTINUED | OUTPATIENT
Start: 2023-02-15 | End: 2023-02-15

## 2023-02-15 RX ORDER — SODIUM CHLORIDE 9 MG/ML
INJECTION, SOLUTION INTRAMUSCULAR; INTRAVENOUS; SUBCUTANEOUS
Status: DISCONTINUED | OUTPATIENT
Start: 2023-02-15 | End: 2023-02-15 | Stop reason: HOSPADM

## 2023-02-15 RX ADMIN — PROPOFOL 30 MG: 10 INJECTION, EMULSION INTRAVENOUS at 11:02

## 2023-02-15 RX ADMIN — PROPOFOL 70 MG: 10 INJECTION, EMULSION INTRAVENOUS at 10:02

## 2023-02-15 RX ADMIN — SODIUM CHLORIDE, SODIUM GLUCONATE, SODIUM ACETATE, POTASSIUM CHLORIDE AND MAGNESIUM CHLORIDE: 526; 502; 368; 37; 30 INJECTION, SOLUTION INTRAVENOUS at 10:02

## 2023-02-15 RX ADMIN — LIDOCAINE HYDROCHLORIDE 20 MG: 10 INJECTION, SOLUTION EPIDURAL; INFILTRATION; INTRACAUDAL; PERINEURAL at 10:02

## 2023-02-15 NOTE — ANESTHESIA POSTPROCEDURE EVALUATION
Anesthesia Post Evaluation    Patient: Carmita Menjivar    Procedure(s) Performed: Procedure(s) (LRB):  Injection-steroid-epidural-cervical (N/A)    Final Anesthesia Type: general      Patient location during evaluation: PACU  Patient participation: Yes- Able to Participate  Level of consciousness: awake and alert  Post-procedure vital signs: reviewed and stable  Pain management: adequate  Airway patency: patent    PONV status at discharge: No PONV  Anesthetic complications: no      Cardiovascular status: hemodynamically stable  Respiratory status: unassisted  Hydration status: euvolemic  Follow-up not needed.          Vitals Value Taken Time   /68 02/15/23 1136   Temp 36.3 °C (97.4 °F) 02/15/23 1136   Pulse 78 02/15/23 1136   Resp 20 02/15/23 1136   SpO2 99 % 02/15/23 1136         No case tracking events are documented in the log.      Pain/Bel Score: Bel Score: 10 (2/15/2023 11:45 AM)  Modified Bel Score: 20 (2/15/2023 11:45 AM)

## 2023-02-15 NOTE — DISCHARGE SUMMARY
[facility] - Periop Services  Discharge Note  Short Stay    Procedure(s) (LRB):  Injection-steroid-epidural-cervical (N/A)      OUTCOME: Patient tolerated treatment/procedure well without complication and is now ready for discharge.    DISPOSITION: Home or Self Care    FINAL DIAGNOSIS:  <principal problem not specified>    FOLLOWUP: In clinic    DISCHARGE INSTRUCTIONS:  No discharge procedures on file.     TIME SPENT ON DISCHARGE: 5 minutes

## 2023-02-15 NOTE — TRANSFER OF CARE
"Anesthesia Transfer of Care Note    Patient: Carmita Menjivar    Procedure(s) Performed: Procedure(s) (LRB):  Injection-steroid-epidural-cervical (N/A)    Patient location: Other: OR    Anesthesia Type: general    Transport from OR: Transported from OR on room air with adequate spontaneous ventilation    Post pain: adequate analgesia    Post vital signs: stable    Level of consciousness: awake    Nausea/Vomiting: no nausea/vomiting    Complications: none    Transfer of care protocol was followed      Last vitals:   Visit Vitals  /78   Pulse 76   Temp 36.3 °C (97.3 °F)   Resp 18   Ht 5' 8.5" (1.74 m)   Wt 86.4 kg (190 lb 7.6 oz)   SpO2 98%   Breastfeeding No   BMI 28.54 kg/m²     "

## 2023-02-16 VITALS
TEMPERATURE: 97 F | BODY MASS INDEX: 28.22 KG/M2 | WEIGHT: 190.5 LBS | RESPIRATION RATE: 20 BRPM | DIASTOLIC BLOOD PRESSURE: 68 MMHG | SYSTOLIC BLOOD PRESSURE: 114 MMHG | HEART RATE: 78 BPM | HEIGHT: 69 IN | OXYGEN SATURATION: 99 %

## 2023-03-01 ENCOUNTER — OFFICE VISIT (OUTPATIENT)
Dept: PAIN MEDICINE | Facility: CLINIC | Age: 48
End: 2023-03-01
Payer: COMMERCIAL

## 2023-03-01 VITALS
BODY MASS INDEX: 28.14 KG/M2 | HEIGHT: 69 IN | DIASTOLIC BLOOD PRESSURE: 86 MMHG | SYSTOLIC BLOOD PRESSURE: 115 MMHG | WEIGHT: 190 LBS | TEMPERATURE: 98 F | HEART RATE: 79 BPM

## 2023-03-01 DIAGNOSIS — M48.02 FORAMINAL STENOSIS OF CERVICAL REGION: ICD-10-CM

## 2023-03-01 DIAGNOSIS — M54.12 CERVICAL RADICULOPATHY: ICD-10-CM

## 2023-03-01 DIAGNOSIS — M50.30 DDD (DEGENERATIVE DISC DISEASE), CERVICAL: Primary | ICD-10-CM

## 2023-03-01 PROCEDURE — 3079F DIAST BP 80-89 MM HG: CPT | Mod: CPTII,,, | Performed by: NURSE PRACTITIONER

## 2023-03-01 PROCEDURE — 1159F MED LIST DOCD IN RCRD: CPT | Mod: CPTII,,, | Performed by: NURSE PRACTITIONER

## 2023-03-01 PROCEDURE — 1160F RVW MEDS BY RX/DR IN RCRD: CPT | Mod: CPTII,,, | Performed by: NURSE PRACTITIONER

## 2023-03-01 PROCEDURE — 3079F PR MOST RECENT DIASTOLIC BLOOD PRESSURE 80-89 MM HG: ICD-10-PCS | Mod: CPTII,,, | Performed by: NURSE PRACTITIONER

## 2023-03-01 PROCEDURE — 3008F BODY MASS INDEX DOCD: CPT | Mod: CPTII,,, | Performed by: NURSE PRACTITIONER

## 2023-03-01 PROCEDURE — 1160F PR REVIEW ALL MEDS BY PRESCRIBER/CLIN PHARMACIST DOCUMENTED: ICD-10-PCS | Mod: CPTII,,, | Performed by: NURSE PRACTITIONER

## 2023-03-01 PROCEDURE — 1159F PR MEDICATION LIST DOCUMENTED IN MEDICAL RECORD: ICD-10-PCS | Mod: CPTII,,, | Performed by: NURSE PRACTITIONER

## 2023-03-01 PROCEDURE — 99214 OFFICE O/P EST MOD 30 MIN: CPT | Mod: ,,, | Performed by: NURSE PRACTITIONER

## 2023-03-01 PROCEDURE — 99214 PR OFFICE/OUTPT VISIT, EST, LEVL IV, 30-39 MIN: ICD-10-PCS | Mod: ,,, | Performed by: NURSE PRACTITIONER

## 2023-03-01 PROCEDURE — 3074F SYST BP LT 130 MM HG: CPT | Mod: CPTII,,, | Performed by: NURSE PRACTITIONER

## 2023-03-01 PROCEDURE — 3074F PR MOST RECENT SYSTOLIC BLOOD PRESSURE < 130 MM HG: ICD-10-PCS | Mod: CPTII,,, | Performed by: NURSE PRACTITIONER

## 2023-03-01 PROCEDURE — 3008F PR BODY MASS INDEX (BMI) DOCUMENTED: ICD-10-PCS | Mod: CPTII,,, | Performed by: NURSE PRACTITIONER

## 2023-03-01 NOTE — H&P (VIEW-ONLY)
Subjective:      Patient ID: Carmita Menjivar is a 47 y.o. female.    Chief Complaint: Neck Pain (Post-op injection pt states she did not get any relief from injection, norco, ibuprofen for relief, pain level 6/10)    Referred by: No ref. provider found     HPI: Patient presents as a follow up for neck pain after receiving interlaminar cervical epidural steroid injection to C7-T1 on 02/15/2023. Injection did not relieve her pain to neck.; However she noticed 33% improvement of pain  to right arm only. She received 100 % of pain relief numbness to left arm , which is now starting to return.  Left arm and fingers on left arm remain numb.  Pain  also located to bilateral posterior scapula and spasm to lateral sides of posterior neck . She continues with numbness and tingling to left arm. With radiation to all fingers. Reports bilateral equal hand . Denies dropping items to hands. Denies urinary retention or fecal incontinence. Pain control was fleeting.  She has pain to left forearm and epicondyle on left with lifting. She received steroid injection to left elbow which helped her pain. She had EMG scheduled prior to injection and cancelled this.  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 She completed PT and dry needling with noted pain increase     Pain does not wake her up at night. Current pain score is 6/10.   Current pain meds include:  Advil 600 mg 1-2 times a day. She does not exceed 1200 mg daily   Patient would like to repeat the epidural steroid injection to neck.         Pertinent labs 2022:  Creatinine normal 0.96   GFR normal greater than 60  Review of EMR noted a message from  office stating patient had elevated renal labs and recommended reducing NSAIDS  BUN > 19.8    Cervical MRI 2023:  FINDINGS:  There is anatomic alignment at the cervical spine with no spondylolisthesis.  The cervical and visualized upper  thoracic vertebral bodies through the T3 level are normal in morphology and there is no acute or chronic fracture.  There is no significant marrow signal abnormality or osseous lesion.  No prevertebral or paravertebral soft tissue abnormality is identified.  The visualized posterior fossa contents and cervical and upper thoracic spinal cord are normal in morphology and signal characteristics.  The atlanto-occipital and C1-C2 articulations are normal and there is no stenosis at the foramen magnum.  Additional changes of the cervical spine on a level by level basis are as follows:     C2-C3: No significant abnormality or stenosis.     C3-C4: Mild bilateral uncovertebral degeneration without additional abnormality.  Mild right neural foramen narrowing without spinal canal or left neural foramen stenosis.     C4-C5: Intervertebral disc desiccation without disc bulge or protrusion.  Mild bilateral uncovertebral degeneration with mild left proximal neural foramen stenosis and no right neural foramen stenosis.     C5-C6: Intervertebral disc desiccation and annular bulging without disc protrusion.  Mild bilateral uncovertebral degeneration.  No spinal canal or neural foramen stenosis.     C6-C7: Intervertebral disc desiccation and moderate disc space narrowing with mild anterior endplate osteophyte formation.  Mild disc bulge partially effaces the ventral thecal sac with mild spinal canal narrowing in the midline AP spinal canal measuring just under 9 mm.  There is bilateral uncovertebral degeneration and hypertrophy with severe bilateral neural foramen stenosis.     C7-T1: No significant abnormality or stenosis.     T1-T2 through T3-T4: No significant abnormality or stenosis on sagittal imaging.     Impression:     Degenerative disc disease and uncovertebral degeneration at C6-C7 with resulting mild spinal canal stenosis and severe bilateral neural foramen stenosis.  Additional much more mild degenerative changes as  detailed above with mild left proximal neural foramen stenosis at C4-C5 and mild right neural foramen narrowing at C3-C4.        Interventional Pain History  02/15/2023:  Interlaminar epidural steroid injection C7-T1    ROS: Neck pain        Objective:          Physical Exam  General: Well developed; overweight; A&O x 3; No anxiety/depression; NAD  Mental Status: Oriented to person, palce and time. Displays appropriate mood & affect.  Head: Norm cephalic and atraumatic  Neck: bilateral cervical paraspinal banding to trapezius. Discomfort with neck extension. No pain with lateral neck turning or neck flexion. Normal hand  strength bilaterally.   Eyes: normal conjunctiva, normal lids, normal pupils  ENT and mouth: normal external ear, nose, and no lesions noted on the lips.  Respiratory: Symmetrical, Unlabored. No dyspnea  CV: normal  S1/S2, normal rhythm and rate. No peripheral edema.   Abdomen: Non-distended    Extremities:  Gen: No cyanosis or tenderness to palpation bilateral upper and lower extremities  Skin: Warm, pink, dry, no rashes, no lesions on the lumbar spine  Strength: 5/5 motor strength bilateral upper and lower  ROM: Full ROM in bilateral knees and ankles without pain or instability.    Neuro:  Gait: no altalgic lean, normal toe and heel raise  DTR's: 2+ in bilateral patellar, and ankle  Sensory: Numbness to left armando and pain to left epicondyle region. No numbness to      ntact to light touch bilateral  upper and lower extremities    Spine: Normal lordosis. No scoliosis  L-spine ROM: Full ROM to flexion, extension, bilateral rotation,   Straight Leg Raise:   SI Joint: No tenderness to palpation bilaterally.               Assessment:       Encounter Diagnoses   Name Primary?    Foraminal stenosis of cervical region     DDD (degenerative disc disease), cervical Yes    Cervical radiculopathy          Plan:       Summer was seen today for neck pain.    Diagnoses and all orders for this visit:    DDD  (degenerative disc disease), cervical    Foraminal stenosis of cervical region    Cervical radiculopathy     Repeat intralaminar cervical epidural steroid to C7/T1  Pt to hold  Advil, Aleve, Mobic for 7 days prior to repeating cervical epidural steroid injection.  Follow up with Dr. Goodson post op           Past Medical History:   Diagnosis Date    Known health problems: none     Pain        Past Surgical History:   Procedure Laterality Date    ABLATION      Uterine    EPIDURAL STEROID INJECTION INTO CERVICAL SPINE N/A 2/15/2023    Procedure: Injection-steroid-epidural-cervical;  Surgeon: Adore Goodson MD;  Location: [facility] OR;  Service: Pain Management;  Laterality: N/A;  C7-T1    TONSILLECTOMY      TUBAL LIGATION         Family History   Problem Relation Age of Onset    Brain cancer Mother     Melanoma Mother     Heart disease Father     Prostate cancer Father     Coronary artery disease Father        Social History     Socioeconomic History    Marital status:    Tobacco Use    Smoking status: Former     Types: Cigarettes     Quit date:      Years since quittin.1    Smokeless tobacco: Never   Substance and Sexual Activity    Alcohol use: Yes     Alcohol/week: 1.0 standard drink     Types: 1 Shots of liquor per week     Comment: 3 times per week     Drug use: Never    Sexual activity: Yes     Partners: Male       Current Outpatient Medications   Medication Sig Dispense Refill    biotin 1 mg Cap Take 1 tablet by mouth Daily.      cyclobenzaprine (FLEXERIL) 10 MG tablet Take 5 mg by mouth 3 (three) times daily as needed for Muscle spasms.      diclofenac (VOLTAREN) 75 MG EC tablet Take 75 mg by mouth 2 (two) times daily.      ibuprofen (ADVIL,MOTRIN) 200 MG tablet Take 200 mg by mouth every 6 (six) hours as needed for Pain.      mecobalamin (B12 ACTIVE ORAL) Take 1 tablet by mouth.      melatonin 10 mg Tab Take 15 mg by mouth every evening.      multivitamin (THERAGRAN) per tablet Take 1 tablet  by mouth once daily.       No current facility-administered medications for this visit.       Review of patient's allergies indicates:   Allergen Reactions    [place] Rash

## 2023-03-01 NOTE — PROGRESS NOTES
Subjective:      Patient ID: Carmita Menjivar is a 47 y.o. female.    Chief Complaint: Neck Pain (Post-op injection pt states she did not get any relief from injection, norco, ibuprofen for relief, pain level 6/10)    Referred by: No ref. provider found     HPI: Patient presents as a follow up for neck pain after receiving interlaminar cervical epidural steroid injection to C7-T1 on 02/15/2023. Injection did not relieve her pain to neck.; However she noticed 33% improvement of pain  to right arm only. She received 100 % of pain relief numbness to left arm , which is now starting to return.  Left arm and fingers on left arm remain numb.  Pain  also located to bilateral posterior scapula and spasm to lateral sides of posterior neck . She continues with numbness and tingling to left arm. With radiation to all fingers. Reports bilateral equal hand . Denies dropping items to hands. Denies urinary retention or fecal incontinence. Pain control was fleeting.  She has pain to left forearm and epicondyle on left with lifting. She received steroid injection to left elbow which helped her pain. She had EMG scheduled prior to injection and cancelled this.  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 She completed PT and dry needling with noted pain increase     Pain does not wake her up at night. Current pain score is 6/10.   Current pain meds include:  Advil 600 mg 1-2 times a day. She does not exceed 1200 mg daily   Patient would like to repeat the epidural steroid injection to neck.         Pertinent labs 2022:  Creatinine normal 0.96   GFR normal greater than 60  Review of EMR noted a message from  office stating patient had elevated renal labs and recommended reducing NSAIDS  BUN > 19.8    Cervical MRI 2023:  FINDINGS:  There is anatomic alignment at the cervical spine with no spondylolisthesis.  The cervical and visualized upper  thoracic vertebral bodies through the T3 level are normal in morphology and there is no acute or chronic fracture.  There is no significant marrow signal abnormality or osseous lesion.  No prevertebral or paravertebral soft tissue abnormality is identified.  The visualized posterior fossa contents and cervical and upper thoracic spinal cord are normal in morphology and signal characteristics.  The atlanto-occipital and C1-C2 articulations are normal and there is no stenosis at the foramen magnum.  Additional changes of the cervical spine on a level by level basis are as follows:     C2-C3: No significant abnormality or stenosis.     C3-C4: Mild bilateral uncovertebral degeneration without additional abnormality.  Mild right neural foramen narrowing without spinal canal or left neural foramen stenosis.     C4-C5: Intervertebral disc desiccation without disc bulge or protrusion.  Mild bilateral uncovertebral degeneration with mild left proximal neural foramen stenosis and no right neural foramen stenosis.     C5-C6: Intervertebral disc desiccation and annular bulging without disc protrusion.  Mild bilateral uncovertebral degeneration.  No spinal canal or neural foramen stenosis.     C6-C7: Intervertebral disc desiccation and moderate disc space narrowing with mild anterior endplate osteophyte formation.  Mild disc bulge partially effaces the ventral thecal sac with mild spinal canal narrowing in the midline AP spinal canal measuring just under 9 mm.  There is bilateral uncovertebral degeneration and hypertrophy with severe bilateral neural foramen stenosis.     C7-T1: No significant abnormality or stenosis.     T1-T2 through T3-T4: No significant abnormality or stenosis on sagittal imaging.     Impression:     Degenerative disc disease and uncovertebral degeneration at C6-C7 with resulting mild spinal canal stenosis and severe bilateral neural foramen stenosis.  Additional much more mild degenerative changes as  detailed above with mild left proximal neural foramen stenosis at C4-C5 and mild right neural foramen narrowing at C3-C4.        Interventional Pain History  02/15/2023:  Interlaminar epidural steroid injection C7-T1    ROS: Neck pain        Objective:          Physical Exam  General: Well developed; overweight; A&O x 3; No anxiety/depression; NAD  Mental Status: Oriented to person, palce and time. Displays appropriate mood & affect.  Head: Norm cephalic and atraumatic  Neck: bilateral cervical paraspinal banding to trapezius. Discomfort with neck extension. No pain with lateral neck turning or neck flexion. Normal hand  strength bilaterally.   Eyes: normal conjunctiva, normal lids, normal pupils  ENT and mouth: normal external ear, nose, and no lesions noted on the lips.  Respiratory: Symmetrical, Unlabored. No dyspnea  CV: normal  S1/S2, normal rhythm and rate. No peripheral edema.   Abdomen: Non-distended    Extremities:  Gen: No cyanosis or tenderness to palpation bilateral upper and lower extremities  Skin: Warm, pink, dry, no rashes, no lesions on the lumbar spine  Strength: 5/5 motor strength bilateral upper and lower  ROM: Full ROM in bilateral knees and ankles without pain or instability.    Neuro:  Gait: no altalgic lean, normal toe and heel raise  DTR's: 2+ in bilateral patellar, and ankle  Sensory: Numbness to left armando and pain to left epicondyle region. No numbness to      ntact to light touch bilateral  upper and lower extremities    Spine: Normal lordosis. No scoliosis  L-spine ROM: Full ROM to flexion, extension, bilateral rotation,   Straight Leg Raise:   SI Joint: No tenderness to palpation bilaterally.               Assessment:       Encounter Diagnoses   Name Primary?    Foraminal stenosis of cervical region     DDD (degenerative disc disease), cervical Yes    Cervical radiculopathy          Plan:       Summer was seen today for neck pain.    Diagnoses and all orders for this visit:    DDD  (degenerative disc disease), cervical    Foraminal stenosis of cervical region    Cervical radiculopathy     Repeat intralaminar cervical epidural steroid to C7/T1  Pt to hold  Advil, Aleve, Mobic for 7 days prior to repeating cervical epidural steroid injection.  Follow up with Dr. Goodson post op           Past Medical History:   Diagnosis Date    Known health problems: none     Pain        Past Surgical History:   Procedure Laterality Date    ABLATION      Uterine    EPIDURAL STEROID INJECTION INTO CERVICAL SPINE N/A 2/15/2023    Procedure: Injection-steroid-epidural-cervical;  Surgeon: Adore Goodson MD;  Location: [facility] OR;  Service: Pain Management;  Laterality: N/A;  C7-T1    TONSILLECTOMY      TUBAL LIGATION         Family History   Problem Relation Age of Onset    Brain cancer Mother     Melanoma Mother     Heart disease Father     Prostate cancer Father     Coronary artery disease Father        Social History     Socioeconomic History    Marital status:    Tobacco Use    Smoking status: Former     Types: Cigarettes     Quit date:      Years since quittin.1    Smokeless tobacco: Never   Substance and Sexual Activity    Alcohol use: Yes     Alcohol/week: 1.0 standard drink     Types: 1 Shots of liquor per week     Comment: 3 times per week     Drug use: Never    Sexual activity: Yes     Partners: Male       Current Outpatient Medications   Medication Sig Dispense Refill    biotin 1 mg Cap Take 1 tablet by mouth Daily.      cyclobenzaprine (FLEXERIL) 10 MG tablet Take 5 mg by mouth 3 (three) times daily as needed for Muscle spasms.      diclofenac (VOLTAREN) 75 MG EC tablet Take 75 mg by mouth 2 (two) times daily.      ibuprofen (ADVIL,MOTRIN) 200 MG tablet Take 200 mg by mouth every 6 (six) hours as needed for Pain.      mecobalamin (B12 ACTIVE ORAL) Take 1 tablet by mouth.      melatonin 10 mg Tab Take 15 mg by mouth every evening.      multivitamin (THERAGRAN) per tablet Take 1 tablet  by mouth once daily.       No current facility-administered medications for this visit.       Review of patient's allergies indicates:   Allergen Reactions    [place] Rash

## 2023-03-02 ENCOUNTER — PATIENT MESSAGE (OUTPATIENT)
Dept: ADMINISTRATIVE | Facility: HOSPITAL | Age: 48
End: 2023-03-02
Payer: COMMERCIAL

## 2023-03-13 LAB
PAP RECOMMENDATION EXT: NORMAL
PAP SMEAR: NORMAL

## 2023-03-14 ENCOUNTER — TELEPHONE (OUTPATIENT)
Dept: ORTHOPEDICS | Facility: CLINIC | Age: 48
End: 2023-03-14
Payer: COMMERCIAL

## 2023-03-14 NOTE — TELEPHONE ENCOUNTER
Tried to call patient. No answer left a brief message stating for a call back to clarify if she was able to get the NCS/PT done. She called to set up an appoint for an injection. Again she will call back and we will proceed with what the plan from her last visit stated.

## 2023-03-16 ENCOUNTER — TELEPHONE (OUTPATIENT)
Dept: ORTHOPEDICS | Facility: CLINIC | Age: 48
End: 2023-03-16
Payer: COMMERCIAL

## 2023-03-16 NOTE — TELEPHONE ENCOUNTER
Pt previously received a inj in Rt elbow which helped with major relief. Pt states she cancelled nerve test for elbow, states she received a neck inj from Dr. Goodson but states pain started to creep up again. States she did do therapy for awhile but did not help with any relief. Pt states she did put nerve conduction test back on schedule for April 10th with Dr. Lopez. Pt states she does have another appt coming up with Dr. Goodson for more neck injections but states if does not help with relief will stop injections. Advised pt I would speak with Dr. Gallardo as well as trying to get her in sooner for EMG test for the Rt elbow. Advised pt would give her a call back either this afternoon or early tomorrow morning. Pt was in agreement with this.

## 2023-03-20 LAB — BCS RECOMMENDATION EXT: NORMAL

## 2023-03-23 ENCOUNTER — HOSPITAL ENCOUNTER (OUTPATIENT)
Facility: HOSPITAL | Age: 48
Discharge: HOME | End: 2023-03-23
Attending: ANESTHESIOLOGY | Admitting: ANESTHESIOLOGY
Payer: COMMERCIAL

## 2023-03-23 ENCOUNTER — ANESTHESIA (OUTPATIENT)
Dept: SURGERY | Facility: HOSPITAL | Age: 48
End: 2023-03-23
Payer: COMMERCIAL

## 2023-03-23 ENCOUNTER — ANESTHESIA EVENT (OUTPATIENT)
Dept: SURGERY | Facility: HOSPITAL | Age: 48
End: 2023-03-23
Payer: COMMERCIAL

## 2023-03-23 DIAGNOSIS — M54.2 CHRONIC NECK PAIN: ICD-10-CM

## 2023-03-23 DIAGNOSIS — G89.29 CHRONIC NECK PAIN: ICD-10-CM

## 2023-03-23 PROCEDURE — 62321 NJX INTERLAMINAR CRV/THRC: CPT | Performed by: ANESTHESIOLOGY

## 2023-03-23 PROCEDURE — 25000003 PHARM REV CODE 250: Performed by: ANESTHESIOLOGY

## 2023-03-23 PROCEDURE — 63600175 PHARM REV CODE 636 W HCPCS: Performed by: ANESTHESIOLOGY

## 2023-03-23 PROCEDURE — 25000003 PHARM REV CODE 250: Performed by: NURSE ANESTHETIST, CERTIFIED REGISTERED

## 2023-03-23 PROCEDURE — 62321 PR INJ CERV/THORAC, W/GUIDANCE: ICD-10-PCS | Mod: ,,, | Performed by: ANESTHESIOLOGY

## 2023-03-23 PROCEDURE — 37000008 HC ANESTHESIA 1ST 15 MINUTES: Performed by: ANESTHESIOLOGY

## 2023-03-23 PROCEDURE — A4216 STERILE WATER/SALINE, 10 ML: HCPCS | Performed by: ANESTHESIOLOGY

## 2023-03-23 PROCEDURE — 62321 NJX INTERLAMINAR CRV/THRC: CPT | Mod: ,,, | Performed by: ANESTHESIOLOGY

## 2023-03-23 PROCEDURE — 63600175 PHARM REV CODE 636 W HCPCS: Performed by: NURSE ANESTHETIST, CERTIFIED REGISTERED

## 2023-03-23 RX ORDER — PROPOFOL 10 MG/ML
VIAL (ML) INTRAVENOUS
Status: DISCONTINUED | OUTPATIENT
Start: 2023-03-23 | End: 2023-03-23

## 2023-03-23 RX ORDER — LIDOCAINE HYDROCHLORIDE 10 MG/ML
INJECTION, SOLUTION EPIDURAL; INFILTRATION; INTRACAUDAL; PERINEURAL
Status: DISCONTINUED
Start: 2023-03-23 | End: 2023-03-23 | Stop reason: HOSPADM

## 2023-03-23 RX ORDER — LORAZEPAM 2 MG/ML
0.25 INJECTION INTRAMUSCULAR ONCE AS NEEDED
Status: CANCELLED | OUTPATIENT
Start: 2023-03-23 | End: 2034-08-18

## 2023-03-23 RX ORDER — MEPERIDINE HYDROCHLORIDE 25 MG/ML
12.5 INJECTION INTRAMUSCULAR; INTRAVENOUS; SUBCUTANEOUS EVERY 10 MIN PRN
Status: CANCELLED | OUTPATIENT
Start: 2023-03-23 | End: 2023-03-24

## 2023-03-23 RX ORDER — MIDAZOLAM HYDROCHLORIDE 1 MG/ML
2 INJECTION INTRAMUSCULAR; INTRAVENOUS ONCE AS NEEDED
Status: CANCELLED | OUTPATIENT
Start: 2023-03-23 | End: 2034-08-18

## 2023-03-23 RX ORDER — PROCHLORPERAZINE EDISYLATE 5 MG/ML
5 INJECTION INTRAMUSCULAR; INTRAVENOUS EVERY 30 MIN PRN
Status: CANCELLED | OUTPATIENT
Start: 2023-03-23

## 2023-03-23 RX ORDER — DEXAMETHASONE SODIUM PHOSPHATE 10 MG/ML
INJECTION INTRAMUSCULAR; INTRAVENOUS
Status: DISCONTINUED | OUTPATIENT
Start: 2023-03-23 | End: 2023-03-23 | Stop reason: HOSPADM

## 2023-03-23 RX ORDER — ONDANSETRON 2 MG/ML
4 INJECTION INTRAMUSCULAR; INTRAVENOUS DAILY PRN
Status: CANCELLED | OUTPATIENT
Start: 2023-03-23

## 2023-03-23 RX ORDER — DEXAMETHASONE SODIUM PHOSPHATE 10 MG/ML
INJECTION INTRAMUSCULAR; INTRAVENOUS
Status: DISCONTINUED
Start: 2023-03-23 | End: 2023-03-23 | Stop reason: HOSPADM

## 2023-03-23 RX ORDER — LIDOCAINE HYDROCHLORIDE 10 MG/ML
INJECTION, SOLUTION EPIDURAL; INFILTRATION; INTRACAUDAL; PERINEURAL
Status: DISCONTINUED | OUTPATIENT
Start: 2023-03-23 | End: 2023-03-23

## 2023-03-23 RX ORDER — ONDANSETRON 4 MG/1
8 TABLET, ORALLY DISINTEGRATING ORAL EVERY 6 HOURS PRN
Status: CANCELLED | OUTPATIENT
Start: 2023-03-23

## 2023-03-23 RX ORDER — SODIUM CHLORIDE 9 MG/ML
INJECTION, SOLUTION INTRAMUSCULAR; INTRAVENOUS; SUBCUTANEOUS
Status: DISCONTINUED
Start: 2023-03-23 | End: 2023-03-23 | Stop reason: HOSPADM

## 2023-03-23 RX ORDER — SODIUM CHLORIDE 0.9 % (FLUSH) 0.9 %
SYRINGE (ML) INJECTION
Status: DISCONTINUED | OUTPATIENT
Start: 2023-03-23 | End: 2023-03-23 | Stop reason: HOSPADM

## 2023-03-23 RX ORDER — LIDOCAINE HYDROCHLORIDE 10 MG/ML
INJECTION, SOLUTION EPIDURAL; INFILTRATION; INTRACAUDAL; PERINEURAL
Status: DISCONTINUED | OUTPATIENT
Start: 2023-03-23 | End: 2023-03-23 | Stop reason: HOSPADM

## 2023-03-23 RX ORDER — LIDOCAINE HYDROCHLORIDE 10 MG/ML
1 INJECTION, SOLUTION EPIDURAL; INFILTRATION; INTRACAUDAL; PERINEURAL ONCE
Status: CANCELLED | OUTPATIENT
Start: 2023-03-23 | End: 2023-03-23

## 2023-03-23 RX ORDER — IPRATROPIUM BROMIDE AND ALBUTEROL SULFATE 2.5; .5 MG/3ML; MG/3ML
3 SOLUTION RESPIRATORY (INHALATION)
Status: CANCELLED | OUTPATIENT
Start: 2023-03-23

## 2023-03-23 RX ADMIN — PROPOFOL 20 MG: 10 INJECTION, EMULSION INTRAVENOUS at 08:03

## 2023-03-23 RX ADMIN — LIDOCAINE HYDROCHLORIDE 30 MG: 10 INJECTION, SOLUTION EPIDURAL; INFILTRATION; INTRACAUDAL; PERINEURAL at 08:03

## 2023-03-23 RX ADMIN — PROPOFOL 50 MG: 10 INJECTION, EMULSION INTRAVENOUS at 08:03

## 2023-03-23 RX ADMIN — PROPOFOL 30 MG: 10 INJECTION, EMULSION INTRAVENOUS at 08:03

## 2023-03-23 RX ADMIN — SODIUM CHLORIDE, POTASSIUM CHLORIDE, SODIUM LACTATE AND CALCIUM CHLORIDE: 600; 310; 30; 20 INJECTION, SOLUTION INTRAVENOUS at 08:03

## 2023-03-23 NOTE — ANESTHESIA PREPROCEDURE EVALUATION
03/23/2023  Carmita Menjivar is a 47 y.o., female presents as an outpatient for cervical steroid injection.    Last 3 sets of Vitals    Vitals - 1 value per visit 3/18/2023 3/23/2023 3/23/2023   SYSTOLIC - - 108   DIASTOLIC - - 79   Pulse - - 86   Temp - - 98.1   Resp - - -   SPO2 - - 99   Weight (lb) 190 190.7 -   Weight (kg) 86.183 86.5 -   Height 68 68 -   BMI (Calculated) 28.9 29 -   VISIT REPORT - - -   Pain Score  - - -         Lab Results   Component Value Date    WBC 6.9 05/26/2022    HGB 14.2 05/26/2022    HCT 42.0 05/26/2022    MCV 97.2 (H) 05/26/2022     05/26/2022          BMP  Lab Results   Component Value Date     08/16/2022    K 5.0 08/16/2022    CO2 28 08/16/2022    BUN 19.8 (H) 08/16/2022    CREATININE 0.96 08/16/2022    CALCIUM 10.5 (H) 08/16/2022    EGFRNONAA >60 11/06/2019    Pre-op Assessment    I have reviewed the Patient Summary Reports.    I have reviewed the NPO Status.   I have reviewed the Medications.     Review of Systems  Anesthesia Hx:   Denies Personal Hx of Anesthesia complications.   Social:  Non-Smoker    Cardiovascular:  Functional Capacity good / => 4 METS        Physical Exam  General: Well nourished, Cooperative, Alert and Oriented    Airway:  Mallampati: I   Mouth Opening: Normal  TM Distance: Normal  Tongue: Normal  Neck ROM: Normal ROM    Dental:  Intact    Chest/Lungs:  Clear to auscultation, Normal Respiratory Rate    Heart:  Rate: Normal  Rhythm: Regular Rhythm        Anesthesia Plan  Type of Anesthesia, risks & benefits discussed:    Anesthesia Type: Gen Natural Airway  Intra-op Monitoring Plan: Standard ASA Monitors  Induction:  IV  Informed Consent: Informed consent signed with the Patient and all parties understand the risks and agree with anesthesia plan.  All questions answered.   ASA Score: 2  Day of Surgery Review of History & Physical: H&P  Update referred to the surgeon/provider.    Ready For Surgery From Anesthesia Perspective.     .

## 2023-03-23 NOTE — DISCHARGE SUMMARY
[facility] Surgical - Periop Services  Discharge Note  Short Stay    Procedure(s) (LRB):  INJECTION, STEROID, SPINE, CERVICAL, EPIDURAL Intralaminar C7-T1 (N/A)      OUTCOME: Patient tolerated treatment/procedure well without complication and is now ready for discharge.    DISPOSITION: Home or Self Care    FINAL DIAGNOSIS:  <principal problem not specified>    FOLLOWUP: In clinic    DISCHARGE INSTRUCTIONS:  No discharge procedures on file.     TIME SPENT ON DISCHARGE: 5 minutes

## 2023-03-23 NOTE — PLAN OF CARE
PT RECEIVED WITH 22G iv IN RIGHT HAND.  NO REDNESS OR SWELLING.  FLUIDS INFUSING.  VANDAID NOTED TO INJECTION SITE.  SCANT BLOOD TO BANDAID NOTED.  AREA IS WITHOUT REDNESS OR ACTIVE BLEEDING

## 2023-03-23 NOTE — ANESTHESIA POSTPROCEDURE EVALUATION
Anesthesia Post Evaluation    Patient: Carmita Menjivar    Procedure(s) Performed: Procedure(s) (LRB):  INJECTION, STEROID, SPINE, CERVICAL, EPIDURAL Intralaminar C7-T1 (N/A)    Final Anesthesia Type: general      Patient location during evaluation: OPS  Patient participation: Yes- Able to Participate  Level of consciousness: awake and alert  Post-procedure vital signs: reviewed and stable  Pain management: adequate  Airway patency: patent  SIMON mitigation strategies: Preoperative initiation of continuous positive airway pressure (CPAP) or non-invasive positive pressure ventilation (NIPPV)  PONV status at discharge: No PONV  Anesthetic complications: no      Cardiovascular status: blood pressure returned to baseline  Respiratory status: unassisted and spontaneous ventilation  Hydration status: euvolemic  Follow-up not needed.            No case tracking events are documented in the log.      Pain/Bel Score: No data recorded

## 2023-03-23 NOTE — TRANSFER OF CARE
"Anesthesia Transfer of Care Note    Patient: Carmita Menjivar    Procedure(s) Performed: Procedure(s) (LRB):  INJECTION, STEROID, SPINE, CERVICAL, EPIDURAL Intralaminar C7-T1 (N/A)    Patient location: OPS    Anesthesia Type: general    Transport from OR: Transported from OR on room air with adequate spontaneous ventilation    Post pain: adequate analgesia    Post assessment: no apparent anesthetic complications    Post vital signs: stable    Level of consciousness: awake    Nausea/Vomiting: no nausea/vomiting    Complications: none    Transfer of care protocol was followed      Last vitals:   Visit Vitals  /79   Pulse 86   Temp 36.7 °C (98.1 °F) (Oral)   Ht 5' 8" (1.727 m)   Wt 86.5 kg (190 lb 11.2 oz)   SpO2 99%   Breastfeeding No   BMI 29.00 kg/m²     "

## 2023-03-24 VITALS
OXYGEN SATURATION: 99 % | TEMPERATURE: 98 F | HEIGHT: 68 IN | DIASTOLIC BLOOD PRESSURE: 79 MMHG | SYSTOLIC BLOOD PRESSURE: 108 MMHG | HEART RATE: 81 BPM | WEIGHT: 190.69 LBS | BODY MASS INDEX: 28.9 KG/M2

## 2023-03-28 RX ORDER — LIDOCAINE HYDROCHLORIDE 20 MG/ML
1 INJECTION, SOLUTION INFILTRATION; PERINEURAL
Status: DISCONTINUED | OUTPATIENT
Start: 2023-01-11 | End: 2023-03-28 | Stop reason: HOSPADM

## 2023-03-28 RX ORDER — BETAMETHASONE SODIUM PHOSPHATE AND BETAMETHASONE ACETATE 3; 3 MG/ML; MG/ML
6 INJECTION, SUSPENSION INTRA-ARTICULAR; INTRALESIONAL; INTRAMUSCULAR; SOFT TISSUE
Status: DISCONTINUED | OUTPATIENT
Start: 2023-01-11 | End: 2023-03-28 | Stop reason: HOSPADM

## 2023-04-06 ENCOUNTER — OFFICE VISIT (OUTPATIENT)
Dept: PAIN MEDICINE | Facility: CLINIC | Age: 48
End: 2023-04-06
Payer: COMMERCIAL

## 2023-04-06 VITALS
TEMPERATURE: 98 F | BODY MASS INDEX: 28.79 KG/M2 | WEIGHT: 190 LBS | DIASTOLIC BLOOD PRESSURE: 94 MMHG | HEART RATE: 85 BPM | SYSTOLIC BLOOD PRESSURE: 129 MMHG | HEIGHT: 68 IN

## 2023-04-06 DIAGNOSIS — M54.12 CERVICAL RADICULITIS: Primary | ICD-10-CM

## 2023-04-06 DIAGNOSIS — M50.30 DDD (DEGENERATIVE DISC DISEASE), CERVICAL: ICD-10-CM

## 2023-04-06 DIAGNOSIS — M48.02 FORAMINAL STENOSIS OF CERVICAL REGION: ICD-10-CM

## 2023-04-06 DIAGNOSIS — M54.2 CERVICALGIA: ICD-10-CM

## 2023-04-06 PROCEDURE — 1159F MED LIST DOCD IN RCRD: CPT | Mod: CPTII,,, | Performed by: ANESTHESIOLOGY

## 2023-04-06 PROCEDURE — 1160F PR REVIEW ALL MEDS BY PRESCRIBER/CLIN PHARMACIST DOCUMENTED: ICD-10-PCS | Mod: CPTII,,, | Performed by: ANESTHESIOLOGY

## 2023-04-06 PROCEDURE — 99213 OFFICE O/P EST LOW 20 MIN: CPT | Mod: ,,, | Performed by: ANESTHESIOLOGY

## 2023-04-06 PROCEDURE — 3008F BODY MASS INDEX DOCD: CPT | Mod: CPTII,,, | Performed by: ANESTHESIOLOGY

## 2023-04-06 PROCEDURE — 3080F DIAST BP >= 90 MM HG: CPT | Mod: CPTII,,, | Performed by: ANESTHESIOLOGY

## 2023-04-06 PROCEDURE — 1159F PR MEDICATION LIST DOCUMENTED IN MEDICAL RECORD: ICD-10-PCS | Mod: CPTII,,, | Performed by: ANESTHESIOLOGY

## 2023-04-06 PROCEDURE — 1160F RVW MEDS BY RX/DR IN RCRD: CPT | Mod: CPTII,,, | Performed by: ANESTHESIOLOGY

## 2023-04-06 PROCEDURE — 3080F PR MOST RECENT DIASTOLIC BLOOD PRESSURE >= 90 MM HG: ICD-10-PCS | Mod: CPTII,,, | Performed by: ANESTHESIOLOGY

## 2023-04-06 PROCEDURE — 99213 PR OFFICE/OUTPT VISIT, EST, LEVL III, 20-29 MIN: ICD-10-PCS | Mod: ,,, | Performed by: ANESTHESIOLOGY

## 2023-04-06 PROCEDURE — 3008F PR BODY MASS INDEX (BMI) DOCUMENTED: ICD-10-PCS | Mod: CPTII,,, | Performed by: ANESTHESIOLOGY

## 2023-04-06 PROCEDURE — 3074F PR MOST RECENT SYSTOLIC BLOOD PRESSURE < 130 MM HG: ICD-10-PCS | Mod: CPTII,,, | Performed by: ANESTHESIOLOGY

## 2023-04-06 PROCEDURE — 3074F SYST BP LT 130 MM HG: CPT | Mod: CPTII,,, | Performed by: ANESTHESIOLOGY

## 2023-04-06 RX ORDER — CYCLOBENZAPRINE HCL 10 MG
10 TABLET ORAL NIGHTLY
Qty: 30 TABLET | Refills: 2 | Status: SHIPPED | OUTPATIENT
Start: 2023-04-06 | End: 2023-08-22

## 2023-04-06 RX ORDER — CYCLOBENZAPRINE HCL 10 MG
10 TABLET ORAL 3 TIMES DAILY PRN
COMMUNITY
End: 2023-10-04 | Stop reason: SDUPTHER

## 2023-04-06 NOTE — PROGRESS NOTES
Adore Goodson MD        PATIENT NAME: Carmita Menjivar  : 1975  DATE: 23  MRN: [id number]      Billing Provider: Adore Goodson MD  Level of Service:   Patient PCP Information       Provider PCP Type    Cam Yoel Acosta MD General            Reason for Visit / Chief Complaint: Neck Pain (Post-op interlaminar VERONICA, pt states she received great releif that has lasted to date off and on, pain has decreased, radiating to right shoulder and arm, flexeril at night, pain level 5/10)       Update PCP  Update Chief Complaint         History of Present Illness / Problem Focused Workflow     Carmita Menjivar presents to the clinic with Neck Pain (Post-op interlaminar VERONICA, pt states she received great releif that has lasted to date off and on, pain has decreased, radiating to right shoulder and arm, flexeril at night, pain level 5/10)     This is a 47-year-old female who presents to clinic today for follow-up of neck pain radiating into the upper extremities.  She states that she and her  were building a camp and she was doing activity such as hanging sheet rock, which seemed to start making her pain worse in 2022.  She did not do much activity in September and noticed that she felt a little bit better.  However when she resumed activities, her pain started to increase again.  She went to physical therapy and had been noticing increased pain around her right elbow.  She is currently still in physical therapy and having dry needling done.  She had been referred to orthopedics for a steroid injection in her right elbow, which has been helping some.  Prior to that injection, she was noticing numbness and tingling radiating into the 4th and 5th digits of her right hand.  She was then having pain in the left side of her neck radiating down the left upper extremity to her hand.  She was also experiencing burning, numbness, and tingling in the left arm.  She states that massages help only while it is  being done.  She has undergone a series of 2 cervical epidural steroid injections.  She returns today and states that her last injection a couple weeks ago has been helping.  She also had dry needling on Monday, which helped a lot.  She is scheduled to go back in 2 weeks.  She is also going to the chiropractor once a week and has adjustments and neck massages done.  Seven days ago, she started taking Flexeril at bedtime, which has helped quite a bit.  She is scheduled for a nerve conduction study on Monday with Dr. Ortega.  She is currently rating her pain 5/10 on the NRS.  It gets up to 7/10 at worst and down to 3/10 at best since the injection.      Neck Pain   Associated symptoms include numbness.     Review of Systems     Review of Systems   Musculoskeletal:  Positive for neck pain and neck stiffness.   Neurological:  Positive for numbness.   All other systems reviewed and are negative.     Medical / Social / Family History     Past Medical History:   Diagnosis Date    Cervical radiculopathy     DDD (degenerative disc disease), cervical     Foraminal stenosis of cervical region     Known health problems: none     Pain        Past Surgical History:   Procedure Laterality Date    ABLATION  2018    Uterine    EPIDURAL STEROID INJECTION INTO CERVICAL SPINE N/A 2/15/2023    Procedure: Injection-steroid-epidural-cervical;  Surgeon: Adore Goodson MD;  Location: [facility] OR;  Service: Pain Management;  Laterality: N/A;  C7-T1    EPIDURAL STEROID INJECTION INTO CERVICAL SPINE N/A 3/23/2023    Procedure: INJECTION, STEROID, SPINE, CERVICAL, EPIDURAL Intralaminar C7-T1;  Surgeon: Adore Goodson MD;  Location: [facility] OR;  Service: Pain Management;  Laterality: N/A;  Intralaminar C7-T1    TONSILLECTOMY      TUBAL LIGATION         Social History  Ms. Menjivar  reports that she quit smoking about 17 years ago. Her smoking use included cigarettes. She has never used smokeless tobacco. She reports current alcohol use of about  1.0 standard drink per week. She reports that she does not use drugs.    Family History  Ms.'s Otto family history includes Brain cancer in her mother; Coronary artery disease in her father; Heart disease in her father; Melanoma in her mother; Prostate cancer in her father.    Medications and Allergies     Medications  Outpatient Medications Marked as Taking for the 4/6/23 encounter (Office Visit) with Adore Goodson MD   Medication Sig Dispense Refill    biotin 1 mg Cap Take 1 tablet by mouth Daily.      cyclobenzaprine (FLEXERIL) 10 MG tablet Take 10 mg by mouth 3 (three) times daily as needed for Muscle spasms.      ibuprofen (ADVIL,MOTRIN) 200 MG tablet Take 200 mg by mouth every 6 (six) hours as needed for Pain.      melatonin 10 mg Tab Take 15 mg by mouth every evening.      multivitamin (THERAGRAN) per tablet Take 1 tablet by mouth once daily.         Allergies  Review of patient's allergies indicates:   Allergen Reactions    [place] Rash       Physical Examination     Vitals:    04/06/23 1103   BP: (!) 129/94   Pulse: 85   Temp: 98.3 °F (36.8 °C)     Spine Musculoskeletal Exam    Gait    Gait is normal.    Inspection    Cervical Spine    Cervical spine inspection is normal.    Palpation    Cervical Spine    Cervical spine palpation is normal.    Range of Motion    Cervical Spine    Cervical spine range of motion is normal.         Strength    Cervical Spine    Cervical spine motor exam is normal.    Sensory    Cervical Spine    Cervical spine sensation is normal.    Neurovascular    Cervical Spine    Cervical spine neurovascular exam is normal.    General      Constitutional: appears stated age, well-developed and well-nourished    Scleral icterus: no    Labored breathing: no    Psychiatric: normal mood and affect and no acute distress    Neurological: alert and oriented x3    Skin: intact    Lymphadenopathy: none     Assessment and Plan (including Health Maintenance)      Problem List  Smart Sets   Document Outside HM   :    Plan:   Cervical radiculitis    DDD (degenerative disc disease), cervical    Foraminal stenosis of cervical region    Cervicalgia       She is doing better since undergoing a series of 2 cervical epidural steroid injections.  She is also doing dry needling and going to the chiropractor.  I sent in a refill of Flexeril for her.  She is scheduled for a nerve conduction study on Monday, with Dr. Ortega.  I will request those results.  We will let her know if any other injections may be indicated.  Otherwise I plan to follow up with her again in 2 months.    Problem List Items Addressed This Visit       Cervicalgia    Cervical radiculitis - Primary    DDD (degenerative disc disease), cervical    Foraminal stenosis of cervical region         Future Appointments   Date Time Provider Department Center   4/24/2023  1:30 PM Severiano Gallardo MD [facility] [place]   5/30/2023  1:00 PM Celeste Acosta MD [facility]        There are no Patient Instructions on file for this visit.  No follow-ups on file.     Signature:  Adore Goodson MD      Date of encounter: 4/6/23         Complex Repair And Graft Additional Text (Will Appearing After The Standard Complex Repair Text): The complex repair was not sufficient to completely close the primary defect. The remaining additional defect was repaired with the graft mentioned below.

## 2023-04-17 ENCOUNTER — OFFICE VISIT (OUTPATIENT)
Dept: ORTHOPEDICS | Facility: CLINIC | Age: 48
End: 2023-04-17
Payer: COMMERCIAL

## 2023-04-17 VITALS — HEIGHT: 68 IN | WEIGHT: 190 LBS | BODY MASS INDEX: 28.79 KG/M2

## 2023-04-17 DIAGNOSIS — M77.11 LATERAL EPICONDYLITIS OF RIGHT ELBOW: Primary | ICD-10-CM

## 2023-04-17 PROCEDURE — 3008F BODY MASS INDEX DOCD: CPT | Mod: CPTII,,, | Performed by: REHABILITATION UNIT

## 2023-04-17 PROCEDURE — 99213 PR OFFICE/OUTPT VISIT, EST, LEVL III, 20-29 MIN: ICD-10-PCS | Mod: ,,, | Performed by: REHABILITATION UNIT

## 2023-04-17 PROCEDURE — 99213 OFFICE O/P EST LOW 20 MIN: CPT | Mod: ,,, | Performed by: REHABILITATION UNIT

## 2023-04-17 PROCEDURE — 3008F PR BODY MASS INDEX (BMI) DOCUMENTED: ICD-10-PCS | Mod: CPTII,,, | Performed by: REHABILITATION UNIT

## 2023-04-17 PROCEDURE — 1159F MED LIST DOCD IN RCRD: CPT | Mod: CPTII,,, | Performed by: REHABILITATION UNIT

## 2023-04-17 PROCEDURE — 1159F PR MEDICATION LIST DOCUMENTED IN MEDICAL RECORD: ICD-10-PCS | Mod: CPTII,,, | Performed by: REHABILITATION UNIT

## 2023-04-17 NOTE — PROGRESS NOTES
Subjective:      Patient ID: Carmita Menjivar is a 47 y.o. female.    Chief Complaint: Follow-up (F/u for EMG results of Rt elbow, )    HPI:   Carmita Menjivar is a 47 y.o. female who presents today for follow up evaluation of right elbow pain. She reports good relief following the injection. Overall she is improved. Massage therapist has been very helpful for her. She reports persistent numbness and tingling, but has decreased in frequency.  She has seen Dr. Goodson and has cervical injections which provided minimal relief.     Initial HPI:  She reports pain started this past fall in August. She has been active with labor at her camp house. She also reports neck pain. She has done PT. She has tried OTC meds. She also reports some sensory changes into her hand. She has seen her PCP. She has tried voltaren gel and had C spine Xrs. Pain worse with activity and somewhat better at rest.     Past Medical History:   Diagnosis Date    Cervical radiculopathy     DDD (degenerative disc disease), cervical     Foraminal stenosis of cervical region     Known health problems: none     Pain      Past Surgical History:   Procedure Laterality Date    ABLATION      Uterine    EPIDURAL STEROID INJECTION INTO CERVICAL SPINE N/A 2/15/2023    Procedure: Injection-steroid-epidural-cervical;  Surgeon: Adore Goodson MD;  Location: [facility] OR;  Service: Pain Management;  Laterality: N/A;  C7-T1    EPIDURAL STEROID INJECTION INTO CERVICAL SPINE N/A 3/23/2023    Procedure: INJECTION, STEROID, SPINE, CERVICAL, EPIDURAL Intralaminar C7-T1;  Surgeon: Adore Goodson MD;  Location: [facility] OR;  Service: Pain Management;  Laterality: N/A;  Intralaminar C7-T1    TONSILLECTOMY      TUBAL LIGATION       Social History     Socioeconomic History    Marital status:    Tobacco Use    Smoking status: Former     Types: Cigarettes     Quit date:      Years since quittin.3    Smokeless tobacco: Never   Substance and Sexual Activity     "Alcohol use: Yes     Alcohol/week: 1.0 standard drink     Types: 1 Shots of liquor per week     Comment: 3 times per week     Drug use: Never    Sexual activity: Yes     Partners: Male         Current Outpatient Medications:     biotin 1 mg Cap, Take 1 tablet by mouth Daily., Disp: , Rfl:     cyclobenzaprine (FLEXERIL) 10 MG tablet, Take 10 mg by mouth 3 (three) times daily as needed for Muscle spasms., Disp: , Rfl:     cyclobenzaprine (FLEXERIL) 10 MG tablet, Take 1 tablet (10 mg total) by mouth every evening., Disp: 30 tablet, Rfl: 2    ibuprofen (ADVIL,MOTRIN) 200 MG tablet, Take 200 mg by mouth every 6 (six) hours as needed for Pain., Disp: , Rfl:     melatonin 10 mg Tab, Take 15 mg by mouth every evening., Disp: , Rfl:     multivitamin (THERAGRAN) per tablet, Take 1 tablet by mouth once daily., Disp: , Rfl:     diclofenac (VOLTAREN) 75 MG EC tablet, Take 75 mg by mouth 2 (two) times daily., Disp: , Rfl:     mecobalamin (B12 ACTIVE ORAL), Take 1 tablet by mouth., Disp: , Rfl:   Review of patient's allergies indicates:   Allergen Reactions    [place] Rash       Ht 5' 8" (1.727 m)   Wt 86.2 kg (190 lb)   BMI 28.89 kg/m²     Comprehensive review of systems completed and negative except as per HPI.        Objective:   Head: Normocephalic, without obvious abnormality, atraumatic  Eyes: conjunctivae/corneas clear. EOM's intact  Ears: normal external appearance  Nose: Nares normal. Septum midline. Mucosa normal. No drainage  Throat: normal findings: lips normal without lesions  Lungs: unlabored breathing on room air  Chest wall: symmetric chest rise  Heart: regular rate and rhythm  Pulses: 2+ and symmetric  Skin: Skin color, texture, turgor normal. No rashes or lesions  Neurologic: Grossly normal    right elbow    Appearance:   normal    Cervical Spine: no ttp; full, painless ROM; negative Spurlings    Tenderness:   Lateral epicondyle - much improved    AROM:   full    PROM:  same    right Elbow    Tenderness: Lateral " epicondyle - improved       Range of Motion:      Flexion: Normal     Extension: Normal     Pronation: Normal     Supination: Normal        Strength      Wrist Extension 5/5     Wrist Flexion 5/5      5/5     Pronation 5/5     Supination 5/5       Varus Stress Test Negative   Valgus Stress Test Negative   Tinels Cubital Tunnel Negative       Pulses: Palpable radial pulse    Neurological deficits: None    The patient has a warm and well-perfused upper extremity with capillary refill less than 2 seconds. Sensation is intact to light touch in terminal nerve distributions. 5/5 ain/pin/uln. The patient has no palpable epitrochlear lymphadenopathy.      Assessment:     Imaging: 3 views of right elbow previously obtained show no acute fractures or dislocations. Joint spaces intact.           1. Lateral epicondylitis of right elbow            Plan:            Imaging and exam findings discussed. She is improved after CSI. She has persistent numbness and tingling to ulnar n distribution and diffusely. EMG without significant abnormality and does not account for compliaints. Continue nsaids, pt, dry needling, massage and symptomatic treatment. F/u as needed. All of her questions were answered and she was in agreement.

## 2023-05-01 ENCOUNTER — PATIENT MESSAGE (OUTPATIENT)
Dept: ADMINISTRATIVE | Facility: HOSPITAL | Age: 48
End: 2023-05-01
Payer: COMMERCIAL

## 2023-05-22 ENCOUNTER — OFFICE VISIT (OUTPATIENT)
Dept: URGENT CARE | Facility: CLINIC | Age: 48
End: 2023-05-22
Payer: COMMERCIAL

## 2023-05-22 ENCOUNTER — PATIENT OUTREACH (OUTPATIENT)
Dept: ADMINISTRATIVE | Facility: HOSPITAL | Age: 48
End: 2023-05-22
Payer: COMMERCIAL

## 2023-05-22 VITALS
SYSTOLIC BLOOD PRESSURE: 131 MMHG | HEIGHT: 68 IN | RESPIRATION RATE: 16 BRPM | HEART RATE: 87 BPM | DIASTOLIC BLOOD PRESSURE: 89 MMHG | BODY MASS INDEX: 28.79 KG/M2 | WEIGHT: 190 LBS | OXYGEN SATURATION: 98 % | TEMPERATURE: 98 F

## 2023-05-22 DIAGNOSIS — M79.644 PAIN OF RIGHT THUMB: Primary | ICD-10-CM

## 2023-05-22 PROCEDURE — 99213 PR OFFICE/OUTPT VISIT, EST, LEVL III, 20-29 MIN: ICD-10-PCS | Mod: S$PBB,25,, | Performed by: PHYSICIAN ASSISTANT

## 2023-05-22 PROCEDURE — 99213 OFFICE O/P EST LOW 20 MIN: CPT | Mod: S$PBB,25,, | Performed by: PHYSICIAN ASSISTANT

## 2023-05-22 PROCEDURE — 90471 IMMUNIZATION ADMIN: CPT | Mod: S$PBB,,, | Performed by: PHYSICIAN ASSISTANT

## 2023-05-22 PROCEDURE — 90715 TDAP VACCINE GREATER THAN OR EQUAL TO 7YO IM: ICD-10-PCS | Mod: S$PBB,,, | Performed by: PHYSICIAN ASSISTANT

## 2023-05-22 PROCEDURE — 90715 TDAP VACCINE 7 YRS/> IM: CPT | Mod: S$PBB,,, | Performed by: PHYSICIAN ASSISTANT

## 2023-05-22 PROCEDURE — 90471 TDAP VACCINE GREATER THAN OR EQUAL TO 7YO IM: ICD-10-PCS | Mod: S$PBB,,, | Performed by: PHYSICIAN ASSISTANT

## 2023-05-22 RX ORDER — DOXYCYCLINE 100 MG/1
100 CAPSULE ORAL EVERY 12 HOURS
Qty: 20 CAPSULE | Refills: 0 | Status: SHIPPED | OUTPATIENT
Start: 2023-05-22 | End: 2023-06-01

## 2023-05-22 NOTE — PROGRESS NOTES
"Subjective:      Patient ID: Carmita Menjivar is a 47 y.o. female.    Vitals:  height is 5' 8" (1.727 m) and weight is 86.2 kg (190 lb). Her temperature is 98.3 °F (36.8 °C). Her blood pressure is 131/89 and her pulse is 87. Her respiration is 16 and oxygen saturation is 98%.     Chief Complaint: Trauma     Patient is a 47 y.o. female who presents to urgent care with complaints of catfish sting on right thumb that happened yesterday while fishing in the [facility].. Swelling, drainage, and throbbing pain present. Alleviating factors include Bactroban and Neosporin with no relief. Patient denies fever, headache, dizziness, nausea/vomiting.      Trauma    Skin:  Positive for erythema.    Objective:     Physical Exam   HENT:   Head: Normocephalic and atraumatic.   Nose: Nose normal.   Neck: Neck supple.   Pulmonary/Chest: Effort normal.   Abdominal: Normal appearance.   Musculoskeletal: Normal range of motion.         General: Tenderness present. Normal range of motion.   Neurological: She is alert.   Skin: Skin is no rash. erythema No lesion   Right thumb:  There is a puncture wound noted to the volar aspect of the thumb distal phalanx.  There is some surrounding erythema and a small area of induration.  Compartments are soft neurovascularly intact capillary refill brisk distally.  Does have full range of motion of the thumb.      X-ray:  No observed radiopaque foreign body.  Awaiting Radiology over-read     Previous History      Review of patient's allergies indicates:   Allergen Reactions    [place] Rash       Past Medical History:   Diagnosis Date    Cervical radiculopathy     DDD (degenerative disc disease), cervical     Foraminal stenosis of cervical region     Known health problems: none     Pain      Current Outpatient Medications   Medication Instructions    biotin 1 mg Cap 1 tablet, Oral, Daily    cyclobenzaprine (FLEXERIL) 10 mg, Oral, 3 times daily PRN    cyclobenzaprine (FLEXERIL) 10 mg, Oral, Nightly    " "diclofenac (VOLTAREN) 75 mg, Oral, 2 times daily    doxycycline (MONODOX) 100 mg, Oral, Every 12 hours    ibuprofen (ADVIL,MOTRIN) 200 mg, Oral, Every 6 hours PRN    mecobalamin (B12 ACTIVE ORAL) 1 tablet, Oral    melatonin 15 mg, Oral, Nightly    multivitamin (THERAGRAN) per tablet 1 tablet, Oral, Daily     Past Surgical History:   Procedure Laterality Date    ABLATION      Uterine    EPIDURAL STEROID INJECTION INTO CERVICAL SPINE N/A 2/15/2023    Procedure: Injection-steroid-epidural-cervical;  Surgeon: Adore Goodson MD;  Location: [facility] OR;  Service: Pain Management;  Laterality: N/A;  C7-T1    EPIDURAL STEROID INJECTION INTO CERVICAL SPINE N/A 3/23/2023    Procedure: INJECTION, STEROID, SPINE, CERVICAL, EPIDURAL Intralaminar C7-T1;  Surgeon: Adore Goodson MD;  Location: [facility] OR;  Service: Pain Management;  Laterality: N/A;  Intralaminar C7-T1    TONSILLECTOMY      TUBAL LIGATION       Family History   Problem Relation Age of Onset    Brain cancer Mother     Melanoma Mother     Heart disease Father     Prostate cancer Father     Coronary artery disease Father        Social History     Tobacco Use    Smoking status: Former     Types: Cigarettes     Quit date:      Years since quittin.3     Passive exposure: Never    Smokeless tobacco: Never   Substance Use Topics    Alcohol use: Yes     Alcohol/week: 1.0 standard drink     Types: 1 Shots of liquor per week     Comment: 3 times per week     Drug use: Never        Physical Exam      Vital Signs Reviewed   /89   Pulse 87   Temp 98.3 °F (36.8 °C)   Resp 16   Ht 5' 8" (1.727 m)   Wt 86.2 kg (190 lb)   SpO2 98%   BMI 28.89 kg/m²        Procedures    Procedures     Labs     Results for orders placed or performed in visit on 23    MAMMOGRAPHY   Result Value Ref Range    BCS Recommendation External Repeat mammogram in 1 year     PAP SMEAR   Result Value Ref Range    PAP Recommendation External No follow-up frequency specified     " Pap Negative for intraephithelial lesion or malignancy Negative for intraephithelial lesion or malignancy, Other     Assessment:     1. Pain of right thumb        Plan:       Pain of right thumb  -     XR FINGER 2 OR MORE VIEWS; Future; Expected date: 05/22/2023    Other orders  -     (In Office Administered) Tdap Vaccine  -     doxycycline (MONODOX) 100 MG capsule; Take 1 capsule (100 mg total) by mouth every 12 (twelve) hours. for 10 days  Dispense: 20 capsule; Refill: 0      Wound Care: Twice daily wound care as discussed.   Pain: Take OTC Tylenol or Ibuprofen per package instructions as needed for pain.  Loosen the bandage if needed.   Follow up with your Primary Care Provider within 3-5 days for a recheck.   Present to the Emergency Department for any significant change or worsening symptoms including worsening redness, swelling, purulent discharge, fever, body aches, or chills.

## 2023-05-22 NOTE — LETTER
"  This communication is flagged as high priority.        AUTHORIZATION FOR RELEASE OF   CONFIDENTIAL INFORMATION    Dear Staff,    We are seeing Carmita Menjivar, date of birth 1975, in the clinic at [facility]. Deepak Acosta MD is the patient's PCP. Carmita Menjivar has an outstanding lab/procedure at the time we reviewed her chart. In order to help keep her health information updated, she has authorized us to request the following medical record(s):        (  xx)  MAMMOGRAM                                      (  )  COLONOSCOPY      (xx  )  PAP SMEAR                                          (  )  OUTSIDE LAB RESULTS     (  )  DEXA SCAN                                          (  )  EYE EXAM            (  )  FOOT EXAM                                          (  )  ENTIRE RECORD     (  )  OUTSIDE IMMUNIZATIONS                 (  )  _______________         Please fax records to [facility], Cam Tu Nguyen, MD,  [phone]             If you have any questions, please contact Elo Mendoza (Connie)Care Coordinator @ [phone]         Patient Name: Carmita Menjivar  : 1975  Patient Phone #: [phone]     "

## 2023-05-22 NOTE — PROGRESS NOTES
The following record(s)  below were uploaded for Health Maintenance .    MAMMOGRAM SCREENING   03/20/2023           PAP SMEAR  03/13/2023

## 2023-05-31 ENCOUNTER — OFFICE VISIT (OUTPATIENT)
Dept: ORTHOPEDICS | Facility: CLINIC | Age: 48
End: 2023-05-31
Payer: COMMERCIAL

## 2023-05-31 VITALS
SYSTOLIC BLOOD PRESSURE: 120 MMHG | WEIGHT: 190 LBS | HEART RATE: 71 BPM | HEIGHT: 68 IN | DIASTOLIC BLOOD PRESSURE: 82 MMHG | BODY MASS INDEX: 28.79 KG/M2

## 2023-05-31 DIAGNOSIS — M77.11 LATERAL EPICONDYLITIS OF RIGHT ELBOW: Primary | ICD-10-CM

## 2023-05-31 PROCEDURE — 3074F PR MOST RECENT SYSTOLIC BLOOD PRESSURE < 130 MM HG: ICD-10-PCS | Mod: CPTII,,, | Performed by: REHABILITATION UNIT

## 2023-05-31 PROCEDURE — 1159F MED LIST DOCD IN RCRD: CPT | Mod: CPTII,,, | Performed by: REHABILITATION UNIT

## 2023-05-31 PROCEDURE — 99213 PR OFFICE/OUTPT VISIT, EST, LEVL III, 20-29 MIN: ICD-10-PCS | Mod: 25,,, | Performed by: REHABILITATION UNIT

## 2023-05-31 PROCEDURE — 3008F BODY MASS INDEX DOCD: CPT | Mod: CPTII,,, | Performed by: REHABILITATION UNIT

## 2023-05-31 PROCEDURE — 3008F PR BODY MASS INDEX (BMI) DOCUMENTED: ICD-10-PCS | Mod: CPTII,,, | Performed by: REHABILITATION UNIT

## 2023-05-31 PROCEDURE — 3079F PR MOST RECENT DIASTOLIC BLOOD PRESSURE 80-89 MM HG: ICD-10-PCS | Mod: CPTII,,, | Performed by: REHABILITATION UNIT

## 2023-05-31 PROCEDURE — 3079F DIAST BP 80-89 MM HG: CPT | Mod: CPTII,,, | Performed by: REHABILITATION UNIT

## 2023-05-31 PROCEDURE — 3074F SYST BP LT 130 MM HG: CPT | Mod: CPTII,,, | Performed by: REHABILITATION UNIT

## 2023-05-31 PROCEDURE — 1159F PR MEDICATION LIST DOCUMENTED IN MEDICAL RECORD: ICD-10-PCS | Mod: CPTII,,, | Performed by: REHABILITATION UNIT

## 2023-05-31 PROCEDURE — 99213 OFFICE O/P EST LOW 20 MIN: CPT | Mod: 25,,, | Performed by: REHABILITATION UNIT

## 2023-05-31 PROCEDURE — 20551 TENDON ORIGIN: R ELBOW: ICD-10-PCS | Mod: RT,,, | Performed by: REHABILITATION UNIT

## 2023-05-31 PROCEDURE — 20551 NJX 1 TENDON ORIGIN/INSJ: CPT | Mod: RT,,, | Performed by: REHABILITATION UNIT

## 2023-05-31 RX ADMIN — LIDOCAINE HYDROCHLORIDE 1 ML: 20 INJECTION, SOLUTION INFILTRATION; PERINEURAL at 03:05

## 2023-05-31 RX ADMIN — BETAMETHASONE SODIUM PHOSPHATE AND BETAMETHASONE ACETATE 6 MG: 3; 3 INJECTION, SUSPENSION INTRA-ARTICULAR; INTRALESIONAL; INTRAMUSCULAR; SOFT TISSUE at 03:05

## 2023-05-31 NOTE — PROGRESS NOTES
Subjective:      Patient ID: Carmita Menjivar is a 47 y.o. female.    Chief Complaint: Follow-up of the Right Elbow (F/U for right elbow pain. Elbow was doing better. Pt went fishing last weekend and elbow has been aggravated since. No new issues with it. )    HPI:   Carmita Menjivar is a 47 y.o. female who presents today for follow up evaluation of right elbow pain.  She was doing very well until she went fishing last weekend.  No distinct event but she did have some increased pain overlying the lateral epicondyle.  She had been progressing.  No sensory or motor changes from her prior complaints.  She is going on a trip soon.  She is interested in another injection.    Initial HPI:  She reports pain started this past fall in August. She has been active with labor at her camp house. She also reports neck pain. She has done PT. She has tried OTC meds. She also reports some sensory changes into her hand. She has seen her PCP. She has tried voltaren gel and had C spine Xrs. Pain worse with activity and somewhat better at rest.     Past Medical History:   Diagnosis Date    Cervical radiculopathy     DDD (degenerative disc disease), cervical     Foraminal stenosis of cervical region     Known health problems: none     Pain      Past Surgical History:   Procedure Laterality Date    ABLATION  2018    Uterine    EPIDURAL STEROID INJECTION INTO CERVICAL SPINE N/A 2/15/2023    Procedure: Injection-steroid-epidural-cervical;  Surgeon: Adore Goodson MD;  Location: [facility];  Service: Pain Management;  Laterality: N/A;  C7-T1    EPIDURAL STEROID INJECTION INTO CERVICAL SPINE N/A 3/23/2023    Procedure: INJECTION, STEROID, SPINE, CERVICAL, EPIDURAL Intralaminar C7-T1;  Surgeon: Adore Goodson MD;  Location: [facility] OR;  Service: Pain Management;  Laterality: N/A;  Intralaminar C7-T1    TONSILLECTOMY      TUBAL LIGATION       Social History     Socioeconomic History    Marital status:    Tobacco Use    Smoking status:  "Former     Types: Cigarettes     Quit date:      Years since quittin.4     Passive exposure: Never    Smokeless tobacco: Never   Substance and Sexual Activity    Alcohol use: Yes     Alcohol/week: 1.0 standard drink     Types: 1 Shots of liquor per week     Comment: 3 times per week     Drug use: Never    Sexual activity: Yes     Partners: Male         Current Outpatient Medications:     biotin 1 mg Cap, Take 1 tablet by mouth Daily., Disp: , Rfl:     cyclobenzaprine (FLEXERIL) 10 MG tablet, Take 10 mg by mouth 3 (three) times daily as needed for Muscle spasms., Disp: , Rfl:     cyclobenzaprine (FLEXERIL) 10 MG tablet, Take 1 tablet (10 mg total) by mouth every evening., Disp: 30 tablet, Rfl: 2    diclofenac (VOLTAREN) 75 MG EC tablet, Take 75 mg by mouth 2 (two) times daily., Disp: , Rfl:     doxycycline (MONODOX) 100 MG capsule, Take 1 capsule (100 mg total) by mouth every 12 (twelve) hours. for 10 days, Disp: 20 capsule, Rfl: 0    ibuprofen (ADVIL,MOTRIN) 200 MG tablet, Take 200 mg by mouth every 6 (six) hours as needed for Pain., Disp: , Rfl:     mecobalamin (B12 ACTIVE ORAL), Take 1 tablet by mouth., Disp: , Rfl:     melatonin 10 mg Tab, Take 15 mg by mouth every evening., Disp: , Rfl:     multivitamin (THERAGRAN) per tablet, Take 1 tablet by mouth once daily., Disp: , Rfl:   Review of patient's allergies indicates:   Allergen Reactions    [place] Rash       /82   Pulse 71   Ht 5' 8" (1.727 m)   Wt 86.2 kg (190 lb)   BMI 28.89 kg/m²     Comprehensive review of systems completed and negative except as per HPI.        Objective:   Head: Normocephalic, without obvious abnormality, atraumatic  Eyes: conjunctivae/corneas clear. EOM's intact  Ears: normal external appearance  Nose: Nares normal. Septum midline. Mucosa normal. No drainage  Throat: normal findings: lips normal without lesions  Lungs: unlabored breathing on room air  Chest wall: symmetric chest rise  Heart: regular rate and " rhythm  Pulses: 2+ and symmetric  Skin: Skin color, texture, turgor normal. No rashes or lesions  Neurologic: Grossly normal    right elbow    Appearance:   normal    Cervical Spine: no ttp; full, painless ROM; negative Spurlings    Tenderness:   Lateral epicondyle    AROM:   full    PROM:  same    right Elbow    Tenderness: Lateral epicondyle       Range of Motion:      Flexion: Normal     Extension: Normal     Pronation: Normal     Supination: Normal        Strength      Wrist Extension 5/5     Wrist Flexion 5/5      5/5     Pronation 5/5     Supination 5/5       Varus Stress Test Negative   Valgus Stress Test Negative   Tinels Cubital Tunnel Negative       Pulses: Palpable radial pulse    Neurological deficits: None    The patient has a warm and well-perfused upper extremity with capillary refill less than 2 seconds. Sensation is intact to light touch in terminal nerve distributions. 5/5 ain/pin/uln. The patient has no palpable epitrochlear lymphadenopathy.      Assessment:     Imaging: 3 views of right elbow previously obtained show no acute fractures or dislocations. Joint spaces intact.         Tendon Origin: R elbow    Date/Time: 5/31/2023 3:15 PM  Performed by: Severiano Gallardo MD  Authorized by: Severiano Gallardo MD     Consent Done?:  Yes (Verbal)  Timeout: prior to procedure the correct patient, procedure, and site was verified    Indications:  Pain  Site marked: the procedure site was marked    Timeout: prior to procedure the correct patient, procedure, and site was verified    Location:  Elbow  Site:  R elbow  Prep: patient was prepped and draped in usual sterile fashion    Ultrasonic Guidance for Needle Placement?: No    Needle size:  24 G  Approach:  Anterolateral  Medications:  1 mL LIDOcaine HCL 20 mg/ml (2%) 20 mg/mL (2 %); 6 mg betamethasone acetate-betamethasone sodium phosphate 6 mg/mL  Patient tolerance:  Patient tolerated the procedure well with no immediate complications        1. Lateral  epicondylitis of right elbow              Plan:            Imaging and exam findings discussed.  She is doing very well until she had some return of her pain at the lateral epicondyle after fishing.  She is interested in an injection which was provided as above and she tolerated this well.  She will follow up with me as needed.  We did discuss MRI for further evaluation if she has return of pain and failure of nonoperative management. All of her questions were answered and she was in agreement.

## 2023-06-01 RX ORDER — BETAMETHASONE SODIUM PHOSPHATE AND BETAMETHASONE ACETATE 3; 3 MG/ML; MG/ML
6 INJECTION, SUSPENSION INTRA-ARTICULAR; INTRALESIONAL; INTRAMUSCULAR; SOFT TISSUE
Status: DISCONTINUED | OUTPATIENT
Start: 2023-05-31 | End: 2023-06-01 | Stop reason: HOSPADM

## 2023-06-01 RX ORDER — LIDOCAINE HYDROCHLORIDE 20 MG/ML
1 INJECTION, SOLUTION INFILTRATION; PERINEURAL
Status: DISCONTINUED | OUTPATIENT
Start: 2023-05-31 | End: 2023-06-01 | Stop reason: HOSPADM

## 2023-06-26 ENCOUNTER — OFFICE VISIT (OUTPATIENT)
Dept: URGENT CARE | Facility: CLINIC | Age: 48
End: 2023-06-26
Payer: COMMERCIAL

## 2023-06-26 VITALS
RESPIRATION RATE: 18 BRPM | WEIGHT: 190 LBS | HEART RATE: 94 BPM | HEIGHT: 68 IN | OXYGEN SATURATION: 98 % | DIASTOLIC BLOOD PRESSURE: 82 MMHG | BODY MASS INDEX: 28.79 KG/M2 | SYSTOLIC BLOOD PRESSURE: 132 MMHG | TEMPERATURE: 98 F

## 2023-06-26 DIAGNOSIS — J02.0 STREP THROAT: Primary | ICD-10-CM

## 2023-06-26 DIAGNOSIS — J02.9 SORE THROAT: ICD-10-CM

## 2023-06-26 DIAGNOSIS — U07.1 COVID: ICD-10-CM

## 2023-06-26 LAB
CTP QC/QA: YES
MOLECULAR STREP A: POSITIVE
POC MOLECULAR INFLUENZA A AGN: NEGATIVE
POC MOLECULAR INFLUENZA B AGN: NEGATIVE
SARS-COV-2 RDRP RESP QL NAA+PROBE: POSITIVE

## 2023-06-26 PROCEDURE — 87635 SARS-COV-2 COVID-19 AMP PRB: CPT | Mod: QW,,, | Performed by: FAMILY MEDICINE

## 2023-06-26 PROCEDURE — 99214 OFFICE O/P EST MOD 30 MIN: CPT | Mod: ,,, | Performed by: FAMILY MEDICINE

## 2023-06-26 PROCEDURE — 87502 POCT INFLUENZA A/B MOLECULAR: ICD-10-PCS | Mod: QW,,, | Performed by: FAMILY MEDICINE

## 2023-06-26 PROCEDURE — 99214 PR OFFICE/OUTPT VISIT, EST, LEVL IV, 30-39 MIN: ICD-10-PCS | Mod: ,,, | Performed by: FAMILY MEDICINE

## 2023-06-26 PROCEDURE — 87635: ICD-10-PCS | Mod: QW,,, | Performed by: FAMILY MEDICINE

## 2023-06-26 PROCEDURE — 87502 INFLUENZA DNA AMP PROBE: CPT | Mod: QW,,, | Performed by: FAMILY MEDICINE

## 2023-06-26 PROCEDURE — 87651 STREP A DNA AMP PROBE: CPT | Mod: QW,,, | Performed by: FAMILY MEDICINE

## 2023-06-26 PROCEDURE — 87651 POCT STREP A MOLECULAR: ICD-10-PCS | Mod: QW,,, | Performed by: FAMILY MEDICINE

## 2023-06-26 RX ORDER — AMOXICILLIN 875 MG/1
875 TABLET, FILM COATED ORAL 2 TIMES DAILY
Qty: 20 TABLET | Refills: 0 | Status: SHIPPED | OUTPATIENT
Start: 2023-06-26 | End: 2023-07-06

## 2023-06-26 NOTE — PATIENT INSTRUCTIONS
Positive COVID positive strep  Medications sent to pharmacy  Monitor for fever  Tylenol or ibuprofen as needed  Warm saltwater gargles  Do not share any food cups drinks or utensils with anybody.  Change your toothbrush after 2 days of antibiotics  Hydrate    Start multi vitamin daily. Current CDC guidelines recommend that you quarantine for 5 days starting the day after your symptoms began. Quarantine can end after 5 days as long as the last 24 hours of quarantine you are fever free and there is improvement of all your symptoms. Wear a mask around others for 5 additional days after quarantine. Treat your symptoms as you would the common cold. If you live with anybody, isolate yourself in a separate bedroom and use a separate bathroom. If your symptoms worsen or you develop shortness of breath or a fever over 103, seek medical attention immediately.

## 2023-06-26 NOTE — PROGRESS NOTES
"Subjective:      Patient ID: Carmita Menjivar is a 47 y.o. female.    Vitals:  height is 5' 8" (1.727 m) and weight is 86.2 kg (190 lb). Her temperature is 98.4 °F (36.9 °C). Her blood pressure is 132/82 and her pulse is 94. Her respiration is 18 and oxygen saturation is 98%.     Chief Complaint: Sore Throat    47 y.o. female presents to clinic w/ c/o h/a, swollen glands, post nasal drip, sore throat, non-productive cough x1d. Alleviating factors used include salt water gargles, Advil w/ improvement. Denies fever, neck stiffness, congestion, wheezing, SOB, CP, N/V/D, abdominal pain and rash.    Sore Throat   Associated symptoms include coughing.     Constitution: Negative.   HENT:  Positive for postnasal drip and sore throat.    Cardiovascular: Negative.    Eyes: Negative.    Respiratory:  Positive for cough.    Gastrointestinal: Negative.    Genitourinary: Negative.    Musculoskeletal: Negative.    Skin: Negative.    Allergic/Immunologic: Negative.    Neurological: Negative.    Hematologic/Lymphatic: Negative.     Objective:     Physical Exam   Constitutional: She is oriented to person, place, and time. She appears well-developed. She is cooperative.  Non-toxic appearance. She does not appear ill. No distress.   HENT:   Head: Normocephalic and atraumatic.   Ears:   Right Ear: Hearing and external ear normal.   Left Ear: Hearing and external ear normal.   Mouth/Throat: Oropharynx is clear and moist and mucous membranes are normal. No posterior oropharyngeal erythema (postnasal drip).   Eyes: Conjunctivae and lids are normal.   Neck: Trachea normal and phonation normal. Neck supple. No edema present. No erythema present. No neck rigidity present.   Cardiovascular: Normal rate.   Pulmonary/Chest: Effort normal and breath sounds normal. No stridor. No respiratory distress. She has no decreased breath sounds. She has no wheezes. She has no rhonchi. She has no rales.   Abdominal: Normal appearance.   Lymphadenopathy:     " She has cervical adenopathy.   Neurological: She is alert and oriented to person, place, and time. She exhibits normal muscle tone. Coordination normal.   Skin: Skin is warm, dry, intact, not diaphoretic and no rash.   Psychiatric: Her speech is normal and behavior is normal. Mood, judgment and thought content normal.   Nursing note and vitals reviewed.       Previous History      Review of patient's allergies indicates:   Allergen Reactions    [place] Rash       Past Medical History:   Diagnosis Date    Cervical radiculopathy     DDD (degenerative disc disease), cervical     Foraminal stenosis of cervical region     Known health problems: none     Pain      Current Outpatient Medications   Medication Instructions    amoxicillin (AMOXIL) 875 mg, Oral, 2 times daily    biotin 1 mg Cap 1 tablet, Oral, Daily    cyclobenzaprine (FLEXERIL) 10 mg, Oral, 3 times daily PRN    cyclobenzaprine (FLEXERIL) 10 mg, Oral, Nightly    diclofenac (VOLTAREN) 75 mg, Oral, 2 times daily    ibuprofen (ADVIL,MOTRIN) 200 mg, Oral, Every 6 hours PRN    mecobalamin (B12 ACTIVE ORAL) 1 tablet, Oral    melatonin 15 mg, Oral, Nightly    multivitamin (THERAGRAN) per tablet 1 tablet, Oral, Daily     Past Surgical History:   Procedure Laterality Date    ABLATION  2018    Uterine    EPIDURAL STEROID INJECTION INTO CERVICAL SPINE N/A 2/15/2023    Procedure: Injection-steroid-epidural-cervical;  Surgeon: Adore Goodson MD;  Location: [facility] OR;  Service: Pain Management;  Laterality: N/A;  C7-T1    EPIDURAL STEROID INJECTION INTO CERVICAL SPINE N/A 3/23/2023    Procedure: INJECTION, STEROID, SPINE, CERVICAL, EPIDURAL Intralaminar C7-T1;  Surgeon: Adore Goodson MD;  Location: [facility] OR;  Service: Pain Management;  Laterality: N/A;  Intralaminar C7-T1    TONSILLECTOMY      TUBAL LIGATION       Family History   Problem Relation Age of Onset    Brain cancer Mother     Melanoma Mother     Heart disease Father     Prostate cancer Father     Coronary artery  "disease Father        Social History     Tobacco Use    Smoking status: Former     Types: Cigarettes     Quit date:      Years since quittin.4     Passive exposure: Never    Smokeless tobacco: Never   Substance Use Topics    Alcohol use: Yes     Alcohol/week: 1.0 standard drink     Types: 1 Shots of liquor per week     Comment: 3 times per week     Drug use: Never        Physical Exam      Vital Signs Reviewed   /82   Pulse 94   Temp 98.4 °F (36.9 °C)   Resp 18   Ht 5' 8" (1.727 m)   Wt 86.2 kg (190 lb)   SpO2 98%   BMI 28.89 kg/m²        Procedures    Procedures     Labs     Results for orders placed or performed in visit on 23   POCT COVID-19 Rapid Screening   Result Value Ref Range    POC Rapid COVID Positive (A) Negative     Acceptable Yes    POCT Influenza A/B MOLECULAR   Result Value Ref Range    POC Molecular Influenza A Ag Negative Negative, Not Reported    POC Molecular Influenza B Ag Negative Negative, Not Reported     Acceptable Yes    POCT Strep A, Molecular   Result Value Ref Range    Molecular Strep A, POC Positive (A) Negative     Acceptable Yes        Assessment:     1. Strep throat    2. Sore throat    3. COVID        Plan:   Positive COVID positive strep  Medications sent to pharmacy  Monitor for fever  Tylenol or ibuprofen as needed  Warm saltwater gargles  Do not share any food cups drinks or utensils with anybody.  Change your toothbrush after 2 days of antibiotics  Hydrate    Start multi vitamin daily. Current CDC guidelines recommend that you quarantine for 5 days starting the day after your symptoms began. Quarantine can end after 5 days as long as the last 24 hours of quarantine you are fever free and there is improvement of all your symptoms. Wear a mask around others for 5 additional days after quarantine. Treat your symptoms as you would the common cold. If you live with anybody, isolate yourself in a separate " bedroom and use a separate bathroom. If your symptoms worsen or you develop shortness of breath or a fever over 103, seek medical attention immediately.       Strep throat    Sore throat  -     POCT COVID-19 Rapid Screening  -     POCT Influenza A/B MOLECULAR  -     POCT Strep A, Molecular    COVID    Other orders  -     amoxicillin (AMOXIL) 875 MG tablet; Take 1 tablet (875 mg total) by mouth 2 (two) times daily. for 10 days  Dispense: 20 tablet; Refill: 0

## 2023-07-25 ENCOUNTER — PATIENT MESSAGE (OUTPATIENT)
Dept: ADMINISTRATIVE | Facility: HOSPITAL | Age: 48
End: 2023-07-25
Payer: COMMERCIAL

## 2023-08-15 ENCOUNTER — PATIENT MESSAGE (OUTPATIENT)
Dept: ADMINISTRATIVE | Facility: HOSPITAL | Age: 48
End: 2023-08-15
Payer: COMMERCIAL

## 2023-08-21 DIAGNOSIS — M54.12 CERVICAL RADICULITIS: ICD-10-CM

## 2023-08-21 DIAGNOSIS — M50.30 DDD (DEGENERATIVE DISC DISEASE), CERVICAL: ICD-10-CM

## 2023-08-21 DIAGNOSIS — M48.02 FORAMINAL STENOSIS OF CERVICAL REGION: ICD-10-CM

## 2023-08-21 DIAGNOSIS — M54.2 CERVICALGIA: ICD-10-CM

## 2023-08-22 ENCOUNTER — PATIENT OUTREACH (OUTPATIENT)
Dept: ADMINISTRATIVE | Facility: HOSPITAL | Age: 48
End: 2023-08-22
Payer: COMMERCIAL

## 2023-08-22 DIAGNOSIS — Z12.11 COLON CANCER SCREENING: Primary | ICD-10-CM

## 2023-08-22 RX ORDER — CYCLOBENZAPRINE HCL 10 MG
10 TABLET ORAL NIGHTLY
Qty: 30 TABLET | Refills: 2 | Status: SHIPPED | OUTPATIENT
Start: 2023-08-22 | End: 2024-01-08 | Stop reason: SDUPTHER

## 2023-08-22 NOTE — PROGRESS NOTES
Population Health Outreach.  Pt. hose [facility] for Colonoscopy, referral sent pt. notified thru the Portal.

## 2023-09-01 ENCOUNTER — OFFICE VISIT (OUTPATIENT)
Dept: PAIN MEDICINE | Facility: CLINIC | Age: 48
End: 2023-09-01
Payer: COMMERCIAL

## 2023-09-01 VITALS
SYSTOLIC BLOOD PRESSURE: 121 MMHG | TEMPERATURE: 98 F | BODY MASS INDEX: 29.55 KG/M2 | WEIGHT: 195 LBS | HEART RATE: 102 BPM | HEIGHT: 68 IN | DIASTOLIC BLOOD PRESSURE: 87 MMHG

## 2023-09-01 DIAGNOSIS — M54.12 CERVICAL RADICULITIS: ICD-10-CM

## 2023-09-01 DIAGNOSIS — M54.6 PAIN IN THORACIC SPINE: ICD-10-CM

## 2023-09-01 DIAGNOSIS — M50.30 DDD (DEGENERATIVE DISC DISEASE), CERVICAL: ICD-10-CM

## 2023-09-01 DIAGNOSIS — M48.02 FORAMINAL STENOSIS OF CERVICAL REGION: ICD-10-CM

## 2023-09-01 DIAGNOSIS — M54.2 CERVICALGIA: Primary | ICD-10-CM

## 2023-09-01 PROCEDURE — 1160F RVW MEDS BY RX/DR IN RCRD: CPT | Mod: CPTII,,, | Performed by: ANESTHESIOLOGY

## 2023-09-01 PROCEDURE — 99214 PR OFFICE/OUTPT VISIT, EST, LEVL IV, 30-39 MIN: ICD-10-PCS | Mod: ,,, | Performed by: ANESTHESIOLOGY

## 2023-09-01 PROCEDURE — 3079F DIAST BP 80-89 MM HG: CPT | Mod: CPTII,,, | Performed by: ANESTHESIOLOGY

## 2023-09-01 PROCEDURE — 3074F SYST BP LT 130 MM HG: CPT | Mod: CPTII,,, | Performed by: ANESTHESIOLOGY

## 2023-09-01 PROCEDURE — 99214 OFFICE O/P EST MOD 30 MIN: CPT | Mod: ,,, | Performed by: ANESTHESIOLOGY

## 2023-09-01 PROCEDURE — 3008F BODY MASS INDEX DOCD: CPT | Mod: CPTII,,, | Performed by: ANESTHESIOLOGY

## 2023-09-01 PROCEDURE — 1159F MED LIST DOCD IN RCRD: CPT | Mod: CPTII,,, | Performed by: ANESTHESIOLOGY

## 2023-09-01 PROCEDURE — 3079F PR MOST RECENT DIASTOLIC BLOOD PRESSURE 80-89 MM HG: ICD-10-PCS | Mod: CPTII,,, | Performed by: ANESTHESIOLOGY

## 2023-09-01 PROCEDURE — 3008F PR BODY MASS INDEX (BMI) DOCUMENTED: ICD-10-PCS | Mod: CPTII,,, | Performed by: ANESTHESIOLOGY

## 2023-09-01 PROCEDURE — 3074F PR MOST RECENT SYSTOLIC BLOOD PRESSURE < 130 MM HG: ICD-10-PCS | Mod: CPTII,,, | Performed by: ANESTHESIOLOGY

## 2023-09-01 PROCEDURE — 1160F PR REVIEW ALL MEDS BY PRESCRIBER/CLIN PHARMACIST DOCUMENTED: ICD-10-PCS | Mod: CPTII,,, | Performed by: ANESTHESIOLOGY

## 2023-09-01 PROCEDURE — 1159F PR MEDICATION LIST DOCUMENTED IN MEDICAL RECORD: ICD-10-PCS | Mod: CPTII,,, | Performed by: ANESTHESIOLOGY

## 2023-09-01 NOTE — PROGRESS NOTES
Adore Goodson MD        PATIENT NAME: Carmita Menjivar  : 1975  DATE: 23  MRN: [id number]      Billing Provider: Adore Goodson MD  Level of Service:   Patient PCP Information       Provider PCP Type    Cam Yoel Acosta MD General            Reason for Visit / Chief Complaint: Neck Pain (Neck pain continues Has tried chiropractor is not helping. Taking RX and OTC as need which not helping. Home exercise does not help. Having spasms. Having some numbness and tingling going down the arm mostly to the left.  Pain level is 7/10 today. )       Update PCP  Update Chief Complaint         History of Present Illness / Problem Focused Workflow     Carmita Menjivar presents to the clinic with Neck Pain (Neck pain continues Has tried chiropractor is not helping. Taking RX and OTC as need which not helping. Home exercise does not help. Having spasms. Having some numbness and tingling going down the arm mostly to the left.  Pain level is 7/10 today. )     This is a 47-year-old female who presents to clinic today for follow-up of neck pain radiating into the upper extremities.  She states that she and her  were building a camp and she was doing activity such as hanging sheet rock, which seemed to start making her pain worse in 2022.  She did not do much activity in September and noticed that she felt a little bit better.  However when she resumed activities, her pain started to increase again.  She went to physical therapy and had been noticing increased pain around her right elbow.  She is currently still having cupping and dry needling done.  She had been referred to orthopedics for a steroid injection in her right elbow, which has been helping some.  Prior to that injection, she was noticing numbness and tingling radiating into the 4th and 5th digits of her right hand.  She was then having pain in the left side of her neck radiating down the left upper extremity to her hand.  She was also  experiencing burning, numbness, and tingling in the left arm.  She states that massages help only while it is being done.  She has undergone a series of 2 cervical epidural steroid injections.  She is not having much of the pain radiating into the upper extremities, but she is still having neck pain and upper back pain.  She is also going to the chiropractor once a week and has adjustments and neck massages done.       Neck Pain   Associated symptoms include numbness.       Review of Systems     Review of Systems   Musculoskeletal:  Positive for neck pain and neck stiffness.   Neurological:  Positive for numbness.   All other systems reviewed and are negative.       Medical / Social / Family History     Past Medical History:   Diagnosis Date    Cervical radiculopathy     DDD (degenerative disc disease), cervical     Foraminal stenosis of cervical region     Known health problems: none     Pain        Past Surgical History:   Procedure Laterality Date    ABLATION 2018    Uterine    EPIDURAL STEROID INJECTION INTO CERVICAL SPINE N/A 2/15/2023    Procedure: Injection-steroid-epidural-cervical;  Surgeon: Adore Goodson MD;  Location: [facility] OR;  Service: Pain Management;  Laterality: N/A;  C7-T1    EPIDURAL STEROID INJECTION INTO CERVICAL SPINE N/A 3/23/2023    Procedure: INJECTION, STEROID, SPINE, CERVICAL, EPIDURAL Intralaminar C7-T1;  Surgeon: Adore Goodson MD;  Location: [facility] OR;  Service: Pain Management;  Laterality: N/A;  Intralaminar C7-T1    TONSILLECTOMY      TUBAL LIGATION         Social History  Ms. Menjivar  reports that she quit smoking about 17 years ago. Her smoking use included cigarettes. She has never been exposed to tobacco smoke. She has never used smokeless tobacco. She reports current alcohol use of about 1.0 standard drink of alcohol per week. She reports that she does not use drugs.    Family History  Ms.'s Menjivar family history includes Brain cancer in her mother; Coronary artery disease in  her father; Heart disease in her father; Melanoma in her mother; Prostate cancer in her father.    Medications and Allergies     Medications  Outpatient Medications Marked as Taking for the 9/1/23 encounter (Office Visit) with Adore Goodson MD   Medication Sig Dispense Refill    cyclobenzaprine (FLEXERIL) 10 MG tablet Take 10 mg by mouth 3 (three) times daily as needed for Muscle spasms.      cyclobenzaprine (FLEXERIL) 10 MG tablet TAKE 1 TABLET BY MOUTH EVERY DAY IN THE EVENING 30 tablet 2    ibuprofen (ADVIL,MOTRIN) 200 MG tablet Take 200 mg by mouth every 6 (six) hours as needed for Pain.      melatonin 10 mg Tab Take 15 mg by mouth every evening.      multivitamin (THERAGRAN) per tablet Take 1 tablet by mouth once daily.         Allergies  Review of patient's allergies indicates:   Allergen Reactions    [place] Rash       Physical Examination     Vitals:    09/01/23 0829   BP: 121/87   Pulse: 102   Temp: 98.1 °F (36.7 °C)     Spine Musculoskeletal Exam    Gait    Gait is normal.    Inspection    Cervical Spine    Cervical spine inspection is normal.    Palpation    Cervical Spine    Cervical spine palpation is normal.    Range of Motion    Cervical Spine    Cervical spine range of motion is normal.         Strength    Cervical Spine    Cervical spine motor exam is normal.    Sensory    Cervical Spine    Cervical spine sensation is normal.    Neurovascular    Cervical Spine    Cervical spine neurovascular exam is normal.    General      Constitutional: appears stated age, well-developed and well-nourished    Scleral icterus: no    Labored breathing: no    Psychiatric: normal mood and affect and no acute distress    Neurological: alert and oriented x3    Skin: intact    Lymphadenopathy: none       Assessment and Plan (including Health Maintenance)      Problem List  Smart Sets  Document Outside HM   :    Plan:   Cervicalgia    DDD (degenerative disc disease), cervical    Cervical radiculitis    Foraminal  stenosis of cervical region    Pain in thoracic spine  -     MRI Thoracic Spine Without Contrast; Future; Expected date: 09/01/2023    I am getting an MRI of the thoracic spine without contrast for further evaluation of her mid back pain and will see her back to discuss the results.    Problem List Items Addressed This Visit       Cervicalgia - Primary    Cervical radiculitis    DDD (degenerative disc disease), cervical    Foraminal stenosis of cervical region     Other Visit Diagnoses       Pain in thoracic spine        Relevant Orders    MRI Thoracic Spine Without Contrast              Future Appointments   Date Time Provider Department Center   10/9/2023  2:30 PM [facility] [place]        There are no Patient Instructions on file for this visit.  No follow-ups on file.     Signature:  Adore Goodson MD      Date of encounter: 9/1/23

## 2023-09-08 ENCOUNTER — OFFICE VISIT (OUTPATIENT)
Dept: PAIN MEDICINE | Facility: CLINIC | Age: 48
End: 2023-09-08
Payer: COMMERCIAL

## 2023-09-08 VITALS
DIASTOLIC BLOOD PRESSURE: 90 MMHG | HEART RATE: 80 BPM | WEIGHT: 195 LBS | TEMPERATURE: 99 F | SYSTOLIC BLOOD PRESSURE: 124 MMHG | HEIGHT: 68 IN | BODY MASS INDEX: 29.55 KG/M2

## 2023-09-08 DIAGNOSIS — M54.12 CERVICAL RADICULITIS: Primary | ICD-10-CM

## 2023-09-08 DIAGNOSIS — M51.24 DISPLACEMENT OF THORACIC INTERVERTEBRAL DISC: ICD-10-CM

## 2023-09-08 DIAGNOSIS — M50.30 DDD (DEGENERATIVE DISC DISEASE), CERVICAL: ICD-10-CM

## 2023-09-08 DIAGNOSIS — M54.2 CERVICALGIA: ICD-10-CM

## 2023-09-08 DIAGNOSIS — M79.18 MYOFASCIAL PAIN SYNDROME: ICD-10-CM

## 2023-09-08 PROCEDURE — 1160F RVW MEDS BY RX/DR IN RCRD: CPT | Mod: CPTII,,, | Performed by: ANESTHESIOLOGY

## 2023-09-08 PROCEDURE — 99214 PR OFFICE/OUTPT VISIT, EST, LEVL IV, 30-39 MIN: ICD-10-PCS | Mod: 25,,, | Performed by: ANESTHESIOLOGY

## 2023-09-08 PROCEDURE — 1160F PR REVIEW ALL MEDS BY PRESCRIBER/CLIN PHARMACIST DOCUMENTED: ICD-10-PCS | Mod: CPTII,,, | Performed by: ANESTHESIOLOGY

## 2023-09-08 PROCEDURE — 20552 PR INJECT TRIGGER POINT, 1 OR 2: ICD-10-PCS | Mod: ,,, | Performed by: ANESTHESIOLOGY

## 2023-09-08 PROCEDURE — 3074F SYST BP LT 130 MM HG: CPT | Mod: CPTII,,, | Performed by: ANESTHESIOLOGY

## 2023-09-08 PROCEDURE — 20552 NJX 1/MLT TRIGGER POINT 1/2: CPT | Mod: ,,, | Performed by: ANESTHESIOLOGY

## 2023-09-08 PROCEDURE — 1159F MED LIST DOCD IN RCRD: CPT | Mod: CPTII,,, | Performed by: ANESTHESIOLOGY

## 2023-09-08 PROCEDURE — 3008F BODY MASS INDEX DOCD: CPT | Mod: CPTII,,, | Performed by: ANESTHESIOLOGY

## 2023-09-08 PROCEDURE — 3080F PR MOST RECENT DIASTOLIC BLOOD PRESSURE >= 90 MM HG: ICD-10-PCS | Mod: CPTII,,, | Performed by: ANESTHESIOLOGY

## 2023-09-08 PROCEDURE — 99214 OFFICE O/P EST MOD 30 MIN: CPT | Mod: 25,,, | Performed by: ANESTHESIOLOGY

## 2023-09-08 PROCEDURE — 3074F PR MOST RECENT SYSTOLIC BLOOD PRESSURE < 130 MM HG: ICD-10-PCS | Mod: CPTII,,, | Performed by: ANESTHESIOLOGY

## 2023-09-08 PROCEDURE — 3008F PR BODY MASS INDEX (BMI) DOCUMENTED: ICD-10-PCS | Mod: CPTII,,, | Performed by: ANESTHESIOLOGY

## 2023-09-08 PROCEDURE — 1159F PR MEDICATION LIST DOCUMENTED IN MEDICAL RECORD: ICD-10-PCS | Mod: CPTII,,, | Performed by: ANESTHESIOLOGY

## 2023-09-08 PROCEDURE — 3080F DIAST BP >= 90 MM HG: CPT | Mod: CPTII,,, | Performed by: ANESTHESIOLOGY

## 2023-09-08 RX ORDER — TRIAMCINOLONE ACETONIDE 40 MG/ML
40 INJECTION, SUSPENSION INTRA-ARTICULAR; INTRAMUSCULAR
Status: COMPLETED | OUTPATIENT
Start: 2023-09-08 | End: 2023-09-08

## 2023-09-08 RX ORDER — BUPIVACAINE HYDROCHLORIDE 5 MG/ML
1 INJECTION, SOLUTION EPIDURAL; INTRACAUDAL
Status: COMPLETED | OUTPATIENT
Start: 2023-09-08 | End: 2023-09-08

## 2023-09-08 RX ADMIN — TRIAMCINOLONE ACETONIDE 40 MG: 40 INJECTION, SUSPENSION INTRA-ARTICULAR; INTRAMUSCULAR at 12:09

## 2023-09-08 RX ADMIN — BUPIVACAINE HYDROCHLORIDE 5 MG: 5 INJECTION, SOLUTION EPIDURAL; INTRACAUDAL at 12:09

## 2023-09-08 NOTE — PROGRESS NOTES
Adore Goodson MD        PATIENT NAME: Carmita Menjviar  : 1975  DATE: 23  MRN: [id number]      Billing Provider: Adore Goodson MD  Level of Service:   Patient PCP Information       Provider PCP Type    Deepak Acosta MD General            Reason for Visit / Chief Complaint: Neck Pain (T-Spine MRI results today, pt states nothing has changed since her ;last appointment, treating with chiropractor, rx and OTC medication for relief, pain level 5/10)       Update PCP  Update Chief Complaint         History of Present Illness / Problem Focused Workflow     Carmita Menjivar presents to the clinic with Neck Pain (T-Spine MRI results today, pt states nothing has changed since her ;last appointment, treating with chiropractor, rx and OTC medication for relief, pain level 5/10)     This is a 47-year-old female who presents to clinic today for follow-up of neck pain radiating into the upper extremities.  She states that she and her  were building a camp and she was doing activity such as hanging sheet rock, which seemed to start making her pain worse in 2022.  She did not do much activity in September and noticed that she felt a little bit better.  However when she resumed activities, her pain started to increase again.  She went to physical therapy and had been noticing increased pain around her right elbow.  She is currently still having cupping and dry needling done.  She had been referred to orthopedics for a steroid injection in her right elbow, which has been helping some.  Prior to that injection, she was noticing numbness and tingling radiating into the 4th and 5th digits of her right hand.  She was then having pain in the left side of her neck radiating down the left upper extremity to her hand.  She was also experiencing burning, numbness, and tingling in the left arm.  She states that massages help only while it is being done.  She is also going to the chiropractor once a week and  has adjustments and neck massages done. She is here today to discuss the results of her T-spine MRI.      Neck Pain   Associated symptoms include numbness.       Review of Systems     Review of Systems   Musculoskeletal:  Positive for neck pain and neck stiffness.   Neurological:  Positive for numbness.   All other systems reviewed and are negative.       Medical / Social / Family History     Past Medical History:   Diagnosis Date    Cervical radiculopathy     DDD (degenerative disc disease), cervical     Foraminal stenosis of cervical region     Known health problems: none     Pain        Past Surgical History:   Procedure Laterality Date    ABLATION 2018    Uterine    EPIDURAL STEROID INJECTION INTO CERVICAL SPINE N/A 2/15/2023    Procedure: Injection-steroid-epidural-cervical;  Surgeon: Adore Goodson MD;  Location: [facility] OR;  Service: Pain Management;  Laterality: N/A;  C7-T1    EPIDURAL STEROID INJECTION INTO CERVICAL SPINE N/A 3/23/2023    Procedure: INJECTION, STEROID, SPINE, CERVICAL, EPIDURAL Intralaminar C7-T1;  Surgeon: Adore Goodson MD;  Location: [facility] OR;  Service: Pain Management;  Laterality: N/A;  Intralaminar C7-T1    TONSILLECTOMY      TUBAL LIGATION         Social History  Ms. Menjivar  reports that she quit smoking about 17 years ago. Her smoking use included cigarettes. She has never been exposed to tobacco smoke. She has never used smokeless tobacco. She reports current alcohol use of about 1.0 standard drink of alcohol per week. She reports that she does not use drugs.    Family History  Ms.'s Menjivar family history includes Brain cancer in her mother; Coronary artery disease in her father; Heart disease in her father; Melanoma in her mother; Prostate cancer in her father.    Medications and Allergies     Medications  Outpatient Medications Marked as Taking for the 9/8/23 encounter (Office Visit) with Adore Goodson MD   Medication Sig Dispense Refill    biotin 1 mg Cap Take 1 tablet by  mouth Daily.      cyclobenzaprine (FLEXERIL) 10 MG tablet Take 10 mg by mouth 3 (three) times daily as needed for Muscle spasms.      cyclobenzaprine (FLEXERIL) 10 MG tablet TAKE 1 TABLET BY MOUTH EVERY DAY IN THE EVENING 30 tablet 2    ibuprofen (ADVIL,MOTRIN) 200 MG tablet Take 200 mg by mouth every 6 (six) hours as needed for Pain.      melatonin 10 mg Tab Take 15 mg by mouth every evening.      multivitamin (THERAGRAN) per tablet Take 1 tablet by mouth once daily.         Allergies  Review of patient's allergies indicates:   Allergen Reactions    [place] Rash       Physical Examination     Vitals:    09/08/23 1058   BP: (!) 124/90   Pulse: 80   Temp: 98.6 °F (37 °C)   Pain Disability Index (PDI): 29       Spine Musculoskeletal Exam    Gait    Gait is normal.    Inspection    Cervical Spine    Cervical spine inspection is normal.    Palpation    Cervical Spine    Cervical spine palpation is normal.    Range of Motion    Cervical Spine    Cervical spine range of motion is normal.         Strength    Cervical Spine    Cervical spine motor exam is normal.    Sensory    Cervical Spine    Cervical spine sensation is normal.    Neurovascular    Cervical Spine    Cervical spine neurovascular exam is normal.    General      Constitutional: appears stated age, well-developed and well-nourished    Scleral icterus: no    Labored breathing: no    Psychiatric: normal mood and affect and no acute distress    Neurological: alert and oriented x3    Skin: intact    Lymphadenopathy: none      Assessment and Plan (including Health Maintenance)      Problem List  Smart Sets  Document Outside HM   :    Plan:   Cervical radiculitis    DDD (degenerative disc disease), cervical    Cervicalgia    Displacement of thoracic intervertebral disc    Trigger point injections done in clinic today as described below. She will call to follow up as needed.    2 trigger points identified in cervical paraspinal muscles, 1 on the left and 1 on the  right. Each point injected with 20 mg of Kenalog and 0.5 mL of 0.5% bupivacaine. She tolerated the procedure well with no complications.      Problem List Items Addressed This Visit       Cervicalgia    Cervical radiculitis - Primary    DDD (degenerative disc disease), cervical    Displacement of thoracic intervertebral disc         No future appointments.       There are no Patient Instructions on file for this visit.  No follow-ups on file.     Signature:  Adore Goodson MD      Date of encounter: 9/8/23

## 2023-10-04 ENCOUNTER — OFFICE VISIT (OUTPATIENT)
Dept: PRIMARY CARE CLINIC | Facility: CLINIC | Age: 48
End: 2023-10-04
Payer: COMMERCIAL

## 2023-10-04 VITALS
SYSTOLIC BLOOD PRESSURE: 117 MMHG | HEIGHT: 68 IN | RESPIRATION RATE: 18 BRPM | BODY MASS INDEX: 30.16 KG/M2 | DIASTOLIC BLOOD PRESSURE: 88 MMHG | OXYGEN SATURATION: 98 % | WEIGHT: 199 LBS | TEMPERATURE: 98 F | HEART RATE: 100 BPM

## 2023-10-04 DIAGNOSIS — M54.2 CERVICALGIA: ICD-10-CM

## 2023-10-04 DIAGNOSIS — Z00.00 WELLNESS EXAMINATION: Primary | ICD-10-CM

## 2023-10-04 DIAGNOSIS — Z13.6 ENCOUNTER FOR SCREENING FOR CARDIOVASCULAR DISORDERS: ICD-10-CM

## 2023-10-04 PROBLEM — E66.3 OVERWEIGHT (BMI 25.0-29.9): Status: RESOLVED | Noted: 2022-05-23 | Resolved: 2023-10-04

## 2023-10-04 PROCEDURE — 3079F PR MOST RECENT DIASTOLIC BLOOD PRESSURE 80-89 MM HG: ICD-10-PCS | Mod: CPTII,,, | Performed by: STUDENT IN AN ORGANIZED HEALTH CARE EDUCATION/TRAINING PROGRAM

## 2023-10-04 PROCEDURE — 1160F PR REVIEW ALL MEDS BY PRESCRIBER/CLIN PHARMACIST DOCUMENTED: ICD-10-PCS | Mod: CPTII,,, | Performed by: STUDENT IN AN ORGANIZED HEALTH CARE EDUCATION/TRAINING PROGRAM

## 2023-10-04 PROCEDURE — 3008F BODY MASS INDEX DOCD: CPT | Mod: CPTII,,, | Performed by: STUDENT IN AN ORGANIZED HEALTH CARE EDUCATION/TRAINING PROGRAM

## 2023-10-04 PROCEDURE — 99213 PR OFFICE/OUTPT VISIT, EST, LEVL III, 20-29 MIN: ICD-10-PCS | Mod: 25,,, | Performed by: STUDENT IN AN ORGANIZED HEALTH CARE EDUCATION/TRAINING PROGRAM

## 2023-10-04 PROCEDURE — 3074F PR MOST RECENT SYSTOLIC BLOOD PRESSURE < 130 MM HG: ICD-10-PCS | Mod: CPTII,,, | Performed by: STUDENT IN AN ORGANIZED HEALTH CARE EDUCATION/TRAINING PROGRAM

## 2023-10-04 PROCEDURE — 99396 PREV VISIT EST AGE 40-64: CPT | Mod: ,,, | Performed by: STUDENT IN AN ORGANIZED HEALTH CARE EDUCATION/TRAINING PROGRAM

## 2023-10-04 PROCEDURE — 1159F PR MEDICATION LIST DOCUMENTED IN MEDICAL RECORD: ICD-10-PCS | Mod: CPTII,,, | Performed by: STUDENT IN AN ORGANIZED HEALTH CARE EDUCATION/TRAINING PROGRAM

## 2023-10-04 PROCEDURE — 99396 PR PREVENTIVE VISIT,EST,40-64: ICD-10-PCS | Mod: ,,, | Performed by: STUDENT IN AN ORGANIZED HEALTH CARE EDUCATION/TRAINING PROGRAM

## 2023-10-04 PROCEDURE — 3008F PR BODY MASS INDEX (BMI) DOCUMENTED: ICD-10-PCS | Mod: CPTII,,, | Performed by: STUDENT IN AN ORGANIZED HEALTH CARE EDUCATION/TRAINING PROGRAM

## 2023-10-04 PROCEDURE — 3079F DIAST BP 80-89 MM HG: CPT | Mod: CPTII,,, | Performed by: STUDENT IN AN ORGANIZED HEALTH CARE EDUCATION/TRAINING PROGRAM

## 2023-10-04 PROCEDURE — 99213 OFFICE O/P EST LOW 20 MIN: CPT | Mod: 25,,, | Performed by: STUDENT IN AN ORGANIZED HEALTH CARE EDUCATION/TRAINING PROGRAM

## 2023-10-04 PROCEDURE — 3074F SYST BP LT 130 MM HG: CPT | Mod: CPTII,,, | Performed by: STUDENT IN AN ORGANIZED HEALTH CARE EDUCATION/TRAINING PROGRAM

## 2023-10-04 PROCEDURE — 1160F RVW MEDS BY RX/DR IN RCRD: CPT | Mod: CPTII,,, | Performed by: STUDENT IN AN ORGANIZED HEALTH CARE EDUCATION/TRAINING PROGRAM

## 2023-10-04 PROCEDURE — 1159F MED LIST DOCD IN RCRD: CPT | Mod: CPTII,,, | Performed by: STUDENT IN AN ORGANIZED HEALTH CARE EDUCATION/TRAINING PROGRAM

## 2023-10-04 RX ORDER — PREGABALIN 50 MG/1
50 CAPSULE ORAL NIGHTLY PRN
Qty: 30 CAPSULE | Refills: 2 | Status: SHIPPED | OUTPATIENT
Start: 2023-10-04 | End: 2024-01-02

## 2023-10-04 NOTE — PROGRESS NOTES
Chief Complaint  Chief Complaint   Patient presents with    Annual Exam     Pap smear 3/13/23, mammogram 3/20/23, colonoscopy scheduled for 10/30/23       HPI  Carmita Menjivar is a 48 y.o. female with medical diagnoses as listed in the medical history and problem list that presents for for wellness examination.  Current and past medical history reviewed.  Pertinent family and social history reviewed.    CRC screening: C-scope scheduled 10/2023  Cervical cancer screening:UTD 03/2023  Breast cancer screening:  UTD 03/2023    Vaccinations due. vaccination status reviewed, if due and if desired, vaccinations provided and given     Patient continues to have right shoulder and back pain. S/p physical therapy, orthopedic and epidural CSI x 2 with some relief of the pain but it never resolves/ Patient still has pain daily starting in right shoulder radiating to her neck. She takes Flexeril 10mg nightly       Health Maintenance         Date Due Completion Date    HIV Screening Never done ---    Colorectal Cancer Screening Never done ---    COVID-19 Vaccine (3 - Pfizer series) 12/31/2021 11/5/2021    Influenza Vaccine (1) 09/01/2023 11/15/2022    Cervical Cancer Screening 03/13/2024 3/13/2023    Mammogram 03/20/2024 3/20/2023    Hemoglobin A1c (Diabetic Prevention Screening) 05/26/2025 5/26/2022    Lipid Panel 05/26/2027 5/26/2022    TETANUS VACCINE 05/22/2033 5/22/2023            ALLERGIES AND MEDICATIONS: updated and reviewed.  Review of patient's allergies indicates:   Allergen Reactions    [place] Rash     Current Outpatient Medications   Medication Sig Dispense Refill    biotin 1 mg Cap Take 1 tablet by mouth Daily.      cyclobenzaprine (FLEXERIL) 10 MG tablet TAKE 1 TABLET BY MOUTH EVERY DAY IN THE EVENING 30 tablet 2    ibuprofen (ADVIL,MOTRIN) 200 MG tablet Take 200 mg by mouth every 6 (six) hours as needed for Pain.      melatonin 10 mg Tab Take 15 mg by mouth every evening.      multivitamin (THERAGRAN) per tablet  "Take 1 tablet by mouth once daily.      pregabalin (LYRICA) 50 MG capsule Take 1 capsule (50 mg total) by mouth nightly as needed. 30 capsule 2     No current facility-administered medications for this visit.       Histories are reviewed and updated as appropriate     Review of Systems  Comprehensive review of system performed- negative except noted in HPI       Objective:   Vitals:    10/04/23 1255   BP: 117/88   BP Location: Left arm   Patient Position: Sitting   BP Method: Large (Manual)   Pulse: 100   Resp: 18   Temp: 97.5 °F (36.4 °C)   TempSrc: Oral   SpO2: 98%   Weight: 90.3 kg (199 lb)   Height: 5' 8" (1.727 m)    Body mass index is 30.26 kg/m².  Physical Exam  Vitals and nursing note reviewed.   Constitutional:       General: She is not in acute distress.     Appearance: Normal appearance.   HENT:      Head: Normocephalic and atraumatic.      Mouth/Throat:      Mouth: Mucous membranes are moist.      Pharynx: Oropharynx is clear.   Eyes:      Extraocular Movements: Extraocular movements intact.      Conjunctiva/sclera: Conjunctivae normal.   Cardiovascular:      Rate and Rhythm: Normal rate and regular rhythm.   Pulmonary:      Effort: Pulmonary effort is normal.      Breath sounds: Normal breath sounds.   Abdominal:      General: Abdomen is flat. Bowel sounds are normal.      Palpations: Abdomen is soft.   Musculoskeletal:         General: No swelling, tenderness or deformity. Normal range of motion.   Skin:     General: Skin is warm and dry.   Neurological:      General: No focal deficit present.      Mental Status: She is alert and oriented to person, place, and time. Mental status is at baseline.   Psychiatric:         Mood and Affect: Mood normal.         Behavior: Behavior normal.           Assessment & Plan  1. Wellness examination  Overall health status was reviewed   Good health habits reinforced   Labs pending   Appropriate recommendations and preventative care medical information provided, with " annual wellness exam encouraged.       2. Encounter for screening for cardiovascular disorders      3. Cervicalgia  -     pregabalin (LYRICA) 50 MG capsule; Take 1 capsule (50 mg total) by mouth nightly as needed.  Dispense: 30 capsule; Refill: 2  -   Side effects discussed with patient.       Follow up in about 6 months (around 4/4/2024) for Chronic conditions.

## 2023-10-05 ENCOUNTER — LAB VISIT (OUTPATIENT)
Dept: LAB | Facility: HOSPITAL | Age: 48
End: 2023-10-05
Attending: STUDENT IN AN ORGANIZED HEALTH CARE EDUCATION/TRAINING PROGRAM
Payer: COMMERCIAL

## 2023-10-05 DIAGNOSIS — Z13.6 ENCOUNTER FOR SCREENING FOR CARDIOVASCULAR DISORDERS: ICD-10-CM

## 2023-10-05 DIAGNOSIS — Z00.00 WELLNESS EXAMINATION: ICD-10-CM

## 2023-10-05 LAB
ALBUMIN SERPL-MCNC: 4.2 G/DL (ref 3.5–5)
ALBUMIN/GLOB SERPL: 1.3 RATIO (ref 1.1–2)
ALP SERPL-CCNC: 38 UNIT/L (ref 40–150)
ALT SERPL-CCNC: 17 UNIT/L (ref 0–55)
AST SERPL-CCNC: 19 UNIT/L (ref 5–34)
BASOPHILS # BLD AUTO: 0.06 X10(3)/MCL
BASOPHILS NFR BLD AUTO: 1.3 %
BILIRUB SERPL-MCNC: 0.5 MG/DL
BUN SERPL-MCNC: 18.8 MG/DL (ref 7–18.7)
CALCIUM SERPL-MCNC: 10 MG/DL (ref 8.4–10.2)
CHLORIDE SERPL-SCNC: 106 MMOL/L (ref 98–107)
CHOLEST SERPL-MCNC: 200 MG/DL
CHOLEST/HDLC SERPL: 5 {RATIO} (ref 0–5)
CO2 SERPL-SCNC: 29 MMOL/L (ref 22–29)
CREAT SERPL-MCNC: 1.02 MG/DL (ref 0.55–1.02)
EOSINOPHIL # BLD AUTO: 0.07 X10(3)/MCL (ref 0–0.9)
EOSINOPHIL NFR BLD AUTO: 1.5 %
ERYTHROCYTE [DISTWIDTH] IN BLOOD BY AUTOMATED COUNT: 11.9 % (ref 11.5–17)
EST. AVERAGE GLUCOSE BLD GHB EST-MCNC: 93.9 MG/DL
GFR SERPLBLD CREATININE-BSD FMLA CKD-EPI: >60 MLS/MIN/1.73/M2
GLOBULIN SER-MCNC: 3.3 GM/DL (ref 2.4–3.5)
GLUCOSE SERPL-MCNC: 97 MG/DL (ref 74–100)
HBA1C MFR BLD: 4.9 %
HCT VFR BLD AUTO: 44.8 % (ref 37–47)
HDLC SERPL-MCNC: 44 MG/DL (ref 35–60)
HGB BLD-MCNC: 14.5 G/DL (ref 12–16)
IMM GRANULOCYTES # BLD AUTO: 0.01 X10(3)/MCL (ref 0–0.04)
IMM GRANULOCYTES NFR BLD AUTO: 0.2 %
LDLC SERPL CALC-MCNC: 138 MG/DL (ref 50–140)
LYMPHOCYTES # BLD AUTO: 1.78 X10(3)/MCL (ref 0.6–4.6)
LYMPHOCYTES NFR BLD AUTO: 38.6 %
MCH RBC QN AUTO: 32.2 PG (ref 27–31)
MCHC RBC AUTO-ENTMCNC: 32.4 G/DL (ref 33–36)
MCV RBC AUTO: 99.6 FL (ref 80–94)
MONOCYTES # BLD AUTO: 0.38 X10(3)/MCL (ref 0.1–1.3)
MONOCYTES NFR BLD AUTO: 8.2 %
NEUTROPHILS # BLD AUTO: 2.31 X10(3)/MCL (ref 2.1–9.2)
NEUTROPHILS NFR BLD AUTO: 50.2 %
NRBC BLD AUTO-RTO: 0 %
PLATELET # BLD AUTO: 186 X10(3)/MCL (ref 130–400)
PMV BLD AUTO: 10.8 FL (ref 7.4–10.4)
POTASSIUM SERPL-SCNC: 4.8 MMOL/L (ref 3.5–5.1)
PROT SERPL-MCNC: 7.5 GM/DL (ref 6.4–8.3)
RBC # BLD AUTO: 4.5 X10(6)/MCL (ref 4.2–5.4)
SODIUM SERPL-SCNC: 142 MMOL/L (ref 136–145)
TRIGL SERPL-MCNC: 91 MG/DL (ref 37–140)
TSH SERPL-ACNC: 1.94 UIU/ML (ref 0.35–4.94)
VLDLC SERPL CALC-MCNC: 18 MG/DL
WBC # SPEC AUTO: 4.61 X10(3)/MCL (ref 4.5–11.5)

## 2023-10-05 PROCEDURE — 80053 COMPREHEN METABOLIC PANEL: CPT

## 2023-10-05 PROCEDURE — 80061 LIPID PANEL: CPT

## 2023-10-05 PROCEDURE — 83036 HEMOGLOBIN GLYCOSYLATED A1C: CPT

## 2023-10-05 PROCEDURE — 85025 COMPLETE CBC W/AUTO DIFF WBC: CPT

## 2023-10-05 PROCEDURE — 36415 COLL VENOUS BLD VENIPUNCTURE: CPT

## 2023-10-05 PROCEDURE — 84443 ASSAY THYROID STIM HORMONE: CPT

## 2023-10-15 ENCOUNTER — OFFICE VISIT (OUTPATIENT)
Dept: URGENT CARE | Facility: CLINIC | Age: 48
End: 2023-10-15
Payer: COMMERCIAL

## 2023-10-15 VITALS
HEIGHT: 68 IN | HEART RATE: 94 BPM | SYSTOLIC BLOOD PRESSURE: 146 MMHG | DIASTOLIC BLOOD PRESSURE: 91 MMHG | TEMPERATURE: 99 F | RESPIRATION RATE: 18 BRPM | BODY MASS INDEX: 30.16 KG/M2 | OXYGEN SATURATION: 96 % | WEIGHT: 199 LBS

## 2023-10-15 DIAGNOSIS — R50.9 FEVER, UNSPECIFIED FEVER CAUSE: Primary | ICD-10-CM

## 2023-10-15 DIAGNOSIS — U07.1 COVID-19: ICD-10-CM

## 2023-10-15 LAB
CTP QC/QA: YES
CTP QC/QA: YES
POC MOLECULAR INFLUENZA A AGN: NEGATIVE
POC MOLECULAR INFLUENZA B AGN: NEGATIVE
SARS-COV-2 RDRP RESP QL NAA+PROBE: POSITIVE

## 2023-10-15 PROCEDURE — 87502 INFLUENZA DNA AMP PROBE: CPT | Mod: QW,,, | Performed by: NURSE PRACTITIONER

## 2023-10-15 PROCEDURE — 87635: ICD-10-PCS | Mod: QW,,, | Performed by: NURSE PRACTITIONER

## 2023-10-15 PROCEDURE — 99213 OFFICE O/P EST LOW 20 MIN: CPT | Mod: ,,, | Performed by: NURSE PRACTITIONER

## 2023-10-15 PROCEDURE — 87502 POCT INFLUENZA A/B MOLECULAR: ICD-10-PCS | Mod: QW,,, | Performed by: NURSE PRACTITIONER

## 2023-10-15 PROCEDURE — 99213 PR OFFICE/OUTPT VISIT, EST, LEVL III, 20-29 MIN: ICD-10-PCS | Mod: ,,, | Performed by: NURSE PRACTITIONER

## 2023-10-15 PROCEDURE — 87635 SARS-COV-2 COVID-19 AMP PRB: CPT | Mod: QW,,, | Performed by: NURSE PRACTITIONER

## 2023-10-15 RX ORDER — PROMETHAZINE HYDROCHLORIDE AND DEXTROMETHORPHAN HYDROBROMIDE 6.25; 15 MG/5ML; MG/5ML
5 SYRUP ORAL EVERY 4 HOURS PRN
Qty: 140 ML | Refills: 0 | Status: SHIPPED | OUTPATIENT
Start: 2023-10-15 | End: 2023-10-25

## 2023-10-15 NOTE — PROGRESS NOTES
"Subjective:      Patient ID: Carmita Menjivar is a 48 y.o. female.    Vitals:  height is 5' 8" (1.727 m) and weight is 90.3 kg (199 lb). Her temperature is 99.2 °F (37.3 °C). Her blood pressure is 146/91 (abnormal) and her pulse is 94. Her respiration is 18 and oxygen saturation is 96%.     Chief Complaint: Fever (Pt presents to clinic with possible fever, congestion , body aches, and chills. Symptomatic x 1 day. )    Year old white female presents to clinic complaining of flu-like symptoms x1 days.  Symptoms consist of sore throat, body aches, chills, and fever for the last 24 hours.  Patient denies any shortness of breath or chest pain at time of exam.    Fever         Constitution: Positive for fever.      Objective:     Physical Exam   Constitutional: She is oriented to person, place, and time. She appears well-developed. She is cooperative.   HENT:   Head: Normocephalic and atraumatic.   Ears:   Right Ear: Hearing, tympanic membrane, external ear and ear canal normal.   Left Ear: Hearing, tympanic membrane, external ear and ear canal normal.   Nose: Congestion present. No mucosal edema or nasal deformity. No epistaxis. Right sinus exhibits no maxillary sinus tenderness and no frontal sinus tenderness. Left sinus exhibits no maxillary sinus tenderness and no frontal sinus tenderness.   Mouth/Throat: Uvula is midline, oropharynx is clear and moist and mucous membranes are normal. No trismus in the jaw. Normal dentition. No uvula swelling.   Eyes: Conjunctivae and lids are normal.   Neck: Trachea normal and phonation normal. Neck supple.   Cardiovascular: Normal rate, regular rhythm, normal heart sounds and normal pulses.   Pulmonary/Chest: Effort normal and breath sounds normal.   Abdominal: Normal appearance and bowel sounds are normal. Soft.   Musculoskeletal: Normal range of motion.         General: Normal range of motion.   Neurological: She is alert and oriented to person, place, and time. She exhibits " normal muscle tone.   Skin: Skin is warm, dry and intact.   Psychiatric: Her speech is normal and behavior is normal. Judgment and thought content normal.   Nursing note and vitals reviewed.      Assessment:     1. Fever, unspecified fever cause    2. COVID-19        Plan:      Fever / Body Aches:   Use OTC Tylenol or Motrin per package instructions for fever or body aches.    Sore Throat:   Use OTC lozenges or throat sprays, gargle with warm salt and water, warm tea with honey.     Cough:   Cover your mouth with coughing, avoid sharing cups or lip balm, wash your hand before eating or touching your face.    Please quarantine for 5 days from the onset of symptoms. On day 6 of illness, you can return to work/school as long as you have not experienced fever in the last 24 hours.   Seek emergency care for breathing difficulties, chest pain, shortness of breath, or confusion. Given the nature of this disease, severe respiratory distress can develop suddenly on day 8 or 9 of clinical course.  Follow up with you primary care doctor as needed.      Updated CDC guidelines can be found at: https://www.cdc.gov/coronavirus/2019-ncov/hcp/mzqnrl-lb-iirt.html           Fever, unspecified fever cause  -     POCT COVID-19 Rapid Screening  -     POCT Influenza A/B Molecular    COVID-19    Other orders  -     promethazine-dextromethorphan (PROMETHAZINE-DM) 6.25-15 mg/5 mL Syrp; Take 5 mLs by mouth every 4 (four) hours as needed.  Dispense: 140 mL; Refill: 0                       Previous History      Review of patient's allergies indicates:   Allergen Reactions    [place] Rash       Past Medical History:   Diagnosis Date    DDD (degenerative disc disease), cervical     Foraminal stenosis of cervical region      Current Outpatient Medications   Medication Instructions    biotin 1 mg Cap 1 tablet, Oral, Daily    cyclobenzaprine (FLEXERIL) 10 mg, Oral, Nightly    ibuprofen (ADVIL,MOTRIN) 200 mg, Oral, Every 6 hours PRN    melatonin 15 mg,  "Oral, Nightly    multivitamin (THERAGRAN) per tablet 1 tablet, Oral, Daily    pregabalin (LYRICA) 50 mg, Oral, Nightly PRN    promethazine-dextromethorphan (PROMETHAZINE-DM) 6.25-15 mg/5 mL Syrp 5 mLs, Oral, Every 4 hours PRN     Past Surgical History:   Procedure Laterality Date    ABLATION  2018    Uterine    EPIDURAL STEROID INJECTION INTO CERVICAL SPINE N/A 2/15/2023    Procedure: Injection-steroid-epidural-cervical;  Surgeon: Adore Goodson MD;  Location: [facility] OR;  Service: Pain Management;  Laterality: N/A;  C7-T1    EPIDURAL STEROID INJECTION INTO CERVICAL SPINE N/A 3/23/2023    Procedure: INJECTION, STEROID, SPINE, CERVICAL, EPIDURAL Intralaminar C7-T1;  Surgeon: Adore Goodson MD;  Location: [facility] OR;  Service: Pain Management;  Laterality: N/A;  Intralaminar C7-T1    TONSILLECTOMY      TUBAL LIGATION       Family History   Problem Relation Age of Onset    Brain cancer Mother     Melanoma Mother     Heart disease Father     Prostate cancer Father     Coronary artery disease Father        Social History     Tobacco Use    Smoking status: Former     Current packs/day: 0.00     Types: Cigarettes     Quit date:      Years since quittin.7     Passive exposure: Never    Smokeless tobacco: Never   Substance Use Topics    Alcohol use: Yes     Alcohol/week: 1.0 standard drink of alcohol     Types: 1 Shots of liquor per week     Comment: 3 times per week     Drug use: Never        Physical Exam      Vital Signs Reviewed   BP (!) 146/91   Pulse 94   Temp 99.2 °F (37.3 °C)   Resp 18   Ht 5' 8" (1.727 m)   Wt 90.3 kg (199 lb)   LMP  (Exact Date)   SpO2 96%   BMI 30.26 kg/m²        Procedures    Procedures     Labs     Results for orders placed or performed in visit on 10/15/23   POCT COVID-19 Rapid Screening   Result Value Ref Range    POC Rapid COVID Positive (A) Negative     Acceptable Yes    POCT Influenza A/B Molecular   Result Value Ref Range    POC Molecular Influenza A Ag " Negative Negative, Not Reported    POC Molecular Influenza B Ag Negative Negative, Not Reported     Acceptable Yes

## 2023-10-30 ENCOUNTER — DOCUMENTATION ONLY (OUTPATIENT)
Dept: PRIMARY CARE CLINIC | Facility: CLINIC | Age: 48
End: 2023-10-30
Payer: COMMERCIAL

## 2023-10-30 LAB — CRC RECOMMENDATION EXT: NORMAL

## 2023-11-30 ENCOUNTER — TELEPHONE (OUTPATIENT)
Dept: PRIMARY CARE CLINIC | Facility: CLINIC | Age: 48
End: 2023-11-30
Payer: COMMERCIAL

## 2023-11-30 DIAGNOSIS — M50.30 DDD (DEGENERATIVE DISC DISEASE), CERVICAL: ICD-10-CM

## 2023-11-30 DIAGNOSIS — M54.12 CERVICAL RADICULITIS: Primary | ICD-10-CM

## 2023-11-30 DIAGNOSIS — M48.02 FORAMINAL STENOSIS OF CERVICAL REGION: ICD-10-CM

## 2023-11-30 NOTE — TELEPHONE ENCOUNTER
----- Message from Monica Medina sent at 11/30/2023  8:20 AM CST -----  Regarding: referral request  Type:  Patient Requesting Referral    Who Called:pt    Referral to What Specialty:Physical therapy   Reason for Referral:neck    Best Call Back Number:[phone]  Additional Information: called about needing a referral for PT . She stated that she would like the referral to go to [facility] in [place]. Please advise

## 2023-12-26 ENCOUNTER — OFFICE VISIT (OUTPATIENT)
Dept: URGENT CARE | Facility: CLINIC | Age: 48
End: 2023-12-26
Payer: COMMERCIAL

## 2023-12-26 VITALS
HEART RATE: 98 BPM | BODY MASS INDEX: 29.62 KG/M2 | OXYGEN SATURATION: 100 % | TEMPERATURE: 99 F | WEIGHT: 200 LBS | SYSTOLIC BLOOD PRESSURE: 132 MMHG | RESPIRATION RATE: 18 BRPM | HEIGHT: 69 IN | DIASTOLIC BLOOD PRESSURE: 87 MMHG

## 2023-12-26 DIAGNOSIS — J32.9 SINUSITIS, UNSPECIFIED CHRONICITY, UNSPECIFIED LOCATION: Primary | ICD-10-CM

## 2023-12-26 DIAGNOSIS — J02.9 SORE THROAT: ICD-10-CM

## 2023-12-26 LAB
CTP QC/QA: YES
MOLECULAR STREP A: NEGATIVE
POC MOLECULAR INFLUENZA A AGN: NEGATIVE
POC MOLECULAR INFLUENZA B AGN: NEGATIVE
SARS-COV-2 RDRP RESP QL NAA+PROBE: NEGATIVE

## 2023-12-26 PROCEDURE — 96372 THER/PROPH/DIAG INJ SC/IM: CPT | Mod: ,,,

## 2023-12-26 PROCEDURE — 87635: ICD-10-PCS | Mod: QW,,,

## 2023-12-26 PROCEDURE — 96372 PR INJECTION,THERAP/PROPH/DIAG2ST, IM OR SUBCUT: ICD-10-PCS | Mod: ,,,

## 2023-12-26 PROCEDURE — 87651 STREP A DNA AMP PROBE: CPT | Mod: QW,,,

## 2023-12-26 PROCEDURE — 99214 PR OFFICE/OUTPT VISIT, EST, LEVL IV, 30-39 MIN: ICD-10-PCS | Mod: 25,,,

## 2023-12-26 PROCEDURE — 87635 SARS-COV-2 COVID-19 AMP PRB: CPT | Mod: QW,,,

## 2023-12-26 PROCEDURE — 87651 POCT STREP A MOLECULAR: ICD-10-PCS | Mod: QW,,,

## 2023-12-26 PROCEDURE — 87502 POCT INFLUENZA A/B MOLECULAR: ICD-10-PCS | Mod: QW,,,

## 2023-12-26 PROCEDURE — 87502 INFLUENZA DNA AMP PROBE: CPT | Mod: QW,,,

## 2023-12-26 PROCEDURE — 99214 OFFICE O/P EST MOD 30 MIN: CPT | Mod: 25,,,

## 2023-12-26 RX ORDER — DEXAMETHASONE SODIUM PHOSPHATE 100 MG/10ML
10 INJECTION INTRAMUSCULAR; INTRAVENOUS
Status: COMPLETED | OUTPATIENT
Start: 2023-12-26 | End: 2023-12-26

## 2023-12-26 RX ORDER — PREDNISONE 20 MG/1
20 TABLET ORAL DAILY
Qty: 5 TABLET | Refills: 0 | Status: SHIPPED | OUTPATIENT
Start: 2023-12-26 | End: 2023-12-31

## 2023-12-26 RX ORDER — AMOXICILLIN AND CLAVULANATE POTASSIUM 875; 125 MG/1; MG/1
1 TABLET, FILM COATED ORAL EVERY 12 HOURS
Qty: 14 TABLET | Refills: 0 | Status: SHIPPED | OUTPATIENT
Start: 2023-12-26 | End: 2024-01-02

## 2023-12-26 RX ADMIN — DEXAMETHASONE SODIUM PHOSPHATE 10 MG: 100 INJECTION INTRAMUSCULAR; INTRAVENOUS at 09:12

## 2023-12-26 NOTE — PATIENT INSTRUCTIONS
Prednisone- to help with congestion/inflammation- take as prescribed- take with food.  Start tomorrow being you were given a shot in clinic today.     Take OTC cough/cold/congestion medication such as Dayquil/Nyquil.  May also take antihistamine such as Claritin/Zyrtec/Allegra.  Cepacol sore throat lozenges if needed.     Drink plenty of fluids.  Rest.      Take antibiotic until completely gone. Take with food to avoid upset stomach.

## 2023-12-26 NOTE — PROGRESS NOTES
"Subjective:      Patient ID: Carmita Menjivar is a 48 y.o. female.    Vitals:  height is 5' 9" (1.753 m) and weight is 90.7 kg (200 lb). Her temperature is 98.5 °F (36.9 °C). Her blood pressure is 132/87 and her pulse is 98. Her respiration is 18 and oxygen saturation is 100%.     Chief Complaint: Sinus Problem (Patient presents to the clinic today with complaints of a nasal drip that started X 2 weeks ago and a sore throat and fever that started last night. )    Patient is a 48-year-old female that presents stating that she has had congestion and nasal drip for the past 2 weeks, last night started with a sore throat and fever. Patient denies any SOB, CP, rash, n/v/d, or neck stiffness.          Sinus Problem  Associated symptoms include congestion, sinus pressure and a sore throat.       Constitution: Positive for fever.   HENT:  Positive for congestion, postnasal drip, sinus pressure and sore throat.       Objective:     Physical Exam   Constitutional: She is oriented to person, place, and time.  Non-toxic appearance. She does not appear ill.   HENT:   Ears:   Right Ear: Tympanic membrane, external ear and ear canal normal.   Left Ear: Tympanic membrane, external ear and ear canal normal.   Nose: Congestion present. Right sinus exhibits maxillary sinus tenderness. Left sinus exhibits maxillary sinus tenderness.   Mouth/Throat: Mucous membranes are moist. Posterior oropharyngeal erythema present. Oropharynx is clear.      Comments: Clear pnd noted  Eyes: Conjunctivae are normal.   Cardiovascular: Normal rate and normal pulses.   Pulmonary/Chest: Effort normal and breath sounds normal.   Abdominal: Normal appearance and bowel sounds are normal. Soft. There is no abdominal tenderness.   Musculoskeletal: Normal range of motion.         General: Normal range of motion.   Neurological: She is alert and oriented to person, place, and time.   Skin: Skin is warm, dry and no rash. Capillary refill takes less than 2 seconds. "   Psychiatric: Her behavior is normal. Mood normal.       Assessment:     1. Sinusitis, unspecified chronicity, unspecified location    2. Sore throat           Previous History      Review of patient's allergies indicates:   Allergen Reactions    [place] Rash       Past Medical History:   Diagnosis Date    DDD (degenerative disc disease), cervical     Foraminal stenosis of cervical region      Current Outpatient Medications   Medication Instructions    amoxicillin-clavulanate 875-125mg (AUGMENTIN) 875-125 mg per tablet 1 tablet, Oral, Every 12 hours    biotin 1 mg Cap 1 tablet, Oral, Daily    cyclobenzaprine (FLEXERIL) 10 mg, Oral, Nightly    ibuprofen (ADVIL,MOTRIN) 200 mg, Oral, Every 6 hours PRN    melatonin 15 mg, Oral, Nightly    multivitamin (THERAGRAN) per tablet 1 tablet, Oral, Daily    predniSONE (DELTASONE) 20 mg, Oral, Daily    pregabalin (LYRICA) 50 mg, Oral, Nightly PRN     Past Surgical History:   Procedure Laterality Date    ABLATION      Uterine    EPIDURAL STEROID INJECTION INTO CERVICAL SPINE N/A 2/15/2023    Procedure: Injection-steroid-epidural-cervical;  Surgeon: Adore Goodson MD;  Location: [facility] OR;  Service: Pain Management;  Laterality: N/A;  C7-T1    EPIDURAL STEROID INJECTION INTO CERVICAL SPINE N/A 3/23/2023    Procedure: INJECTION, STEROID, SPINE, CERVICAL, EPIDURAL Intralaminar C7-T1;  Surgeon: Adore Goodson MD;  Location: [facility] OR;  Service: Pain Management;  Laterality: N/A;  Intralaminar C7-T1    TONSILLECTOMY      TUBAL LIGATION       Family History   Problem Relation Age of Onset    Brain cancer Mother     Melanoma Mother     Heart disease Father     Prostate cancer Father     Coronary artery disease Father        Social History     Tobacco Use    Smoking status: Former     Current packs/day: 0.00     Types: Cigarettes     Quit date:      Years since quittin.9     Passive exposure: Never    Smokeless tobacco: Never   Substance Use Topics    Alcohol use: Yes      "Alcohol/week: 1.0 standard drink of alcohol     Types: 1 Shots of liquor per week     Comment: 3 times per week     Drug use: Never        Physical Exam      Vital Signs Reviewed   /87   Pulse 98   Temp 98.5 °F (36.9 °C)   Resp 18   Ht 5' 9" (1.753 m)   Wt 90.7 kg (200 lb)   SpO2 100%   BMI 29.53 kg/m²        Procedures    Procedures     Labs     Results for orders placed or performed in visit on 12/26/23   POCT Strep A, Molecular   Result Value Ref Range    Molecular Strep A, POC Negative Negative     Acceptable Yes    POCT COVID-19 Rapid Screening   Result Value Ref Range    POC Rapid COVID Negative Negative     Acceptable Yes    POCT Influenza A/B MOLECULAR   Result Value Ref Range    POC Molecular Influenza A Ag Negative Negative, Not Reported    POC Molecular Influenza B Ag Negative Negative, Not Reported     Acceptable Yes       Plan:       Sinusitis, unspecified chronicity, unspecified location  -     dexAMETHasone injection 10 mg  -     amoxicillin-clavulanate 875-125mg (AUGMENTIN) 875-125 mg per tablet; Take 1 tablet by mouth every 12 (twelve) hours. for 7 days  Dispense: 14 tablet; Refill: 0    Sore throat  -     POCT Strep A, Molecular  -     POCT COVID-19 Rapid Screening  -     POCT Influenza A/B MOLECULAR  -     predniSONE (DELTASONE) 20 MG tablet; Take 1 tablet (20 mg total) by mouth once daily. for 5 days  Dispense: 5 tablet; Refill: 0    Prednisone- to help with congestion/inflammation- take as prescribed- take with food.  Start tomorrow being you were given a shot in clinic today.     Take OTC cough/cold/congestion medication such as Dayquil/Nyquil.  May also take antihistamine such as Claritin/Zyrtec/Allegra.  Cepacol sore throat lozenges if needed.     Drink plenty of fluids.  Rest.      Take antibiotic until completely gone. Take with food to avoid upset stomach.                 "

## 2024-01-08 ENCOUNTER — PATIENT MESSAGE (OUTPATIENT)
Dept: PRIMARY CARE CLINIC | Facility: CLINIC | Age: 49
End: 2024-01-08
Payer: COMMERCIAL

## 2024-01-08 DIAGNOSIS — M54.12 CERVICAL RADICULITIS: ICD-10-CM

## 2024-01-08 DIAGNOSIS — M48.02 FORAMINAL STENOSIS OF CERVICAL REGION: ICD-10-CM

## 2024-01-08 DIAGNOSIS — M54.2 CERVICALGIA: ICD-10-CM

## 2024-01-08 DIAGNOSIS — M50.30 DDD (DEGENERATIVE DISC DISEASE), CERVICAL: ICD-10-CM

## 2024-01-08 RX ORDER — CYCLOBENZAPRINE HCL 10 MG
10 TABLET ORAL NIGHTLY
Qty: 30 TABLET | Refills: 2 | Status: SHIPPED | OUTPATIENT
Start: 2024-01-08

## 2024-01-09 ENCOUNTER — OFFICE VISIT (OUTPATIENT)
Dept: URGENT CARE | Facility: CLINIC | Age: 49
End: 2024-01-09
Payer: COMMERCIAL

## 2024-01-09 VITALS
OXYGEN SATURATION: 98 % | RESPIRATION RATE: 20 BRPM | HEART RATE: 76 BPM | SYSTOLIC BLOOD PRESSURE: 122 MMHG | WEIGHT: 200 LBS | HEIGHT: 69 IN | TEMPERATURE: 99 F | BODY MASS INDEX: 29.62 KG/M2 | DIASTOLIC BLOOD PRESSURE: 85 MMHG

## 2024-01-09 DIAGNOSIS — J02.9 SORE THROAT: Primary | ICD-10-CM

## 2024-01-09 LAB
CTP QC/QA: YES
MOLECULAR STREP A: NEGATIVE

## 2024-01-09 PROCEDURE — 99212 OFFICE O/P EST SF 10 MIN: CPT | Mod: ,,,

## 2024-01-09 PROCEDURE — 87651 STREP A DNA AMP PROBE: CPT | Mod: QW,,,

## 2024-01-09 NOTE — PROGRESS NOTES
"Subjective:      Patient ID: Carmita Menjivar is a 48 y.o. female.    Vitals:  height is 5' 9" (1.753 m) and weight is 90.7 kg (200 lb). Her oral temperature is 98.6 °F (37 °C). Her blood pressure is 122/85 and her pulse is 76. Her respiration is 20 and oxygen saturation is 98%.     Chief Complaint: Sore Throat (Sore throat x 1 day.)    Patient is a 48-year-old female that presents complaining of sore throat since yesterday.  She was seen here on 12/28/2023 with sinus congestion for 2 weeks as well as sore throat, given a week of Augmentin and a steroid shot at that time.     Patient denies any SOB, CP, rash, n/v/d, or neck stiffness.      Patient did not want flu or COVID testing, states only having sore throat.        HENT:  Positive for sore throat.       Objective:     Physical Exam   Constitutional: She is oriented to person, place, and time.  Non-toxic appearance. She does not appear ill.   HENT:   Ears:   Right Ear: Tympanic membrane, external ear and ear canal normal.   Left Ear: Tympanic membrane, external ear and ear canal normal.   Nose: Nose normal.   Mouth/Throat: Mucous membranes are moist. No posterior oropharyngeal erythema. Oropharynx is clear.      Comments: Clear pnd noted  Eyes: Conjunctivae are normal.   Cardiovascular: Normal rate and normal pulses.   Pulmonary/Chest: Effort normal and breath sounds normal.   Abdominal: Normal appearance and bowel sounds are normal. Soft. There is no abdominal tenderness.   Musculoskeletal: Normal range of motion.         General: Normal range of motion.   Neurological: She is alert and oriented to person, place, and time.   Skin: Skin is warm, dry and no rash. Capillary refill takes less than 2 seconds.   Psychiatric: Her behavior is normal. Mood normal.       Assessment:     1. Sore throat           Previous History      Review of patient's allergies indicates:   Allergen Reactions    [place] Rash       Past Medical History:   Diagnosis Date    DDD " "(degenerative disc disease), cervical     Foraminal stenosis of cervical region      Current Outpatient Medications   Medication Instructions    biotin 1 mg Cap 1 tablet, Oral, Daily    cyclobenzaprine (FLEXERIL) 10 mg, Oral, Nightly    ibuprofen (ADVIL,MOTRIN) 200 mg, Oral, Every 6 hours PRN    melatonin 15 mg, Oral, Nightly    multivitamin (THERAGRAN) per tablet 1 tablet, Oral, Daily    pregabalin (LYRICA) 50 mg, Oral, Nightly PRN     Past Surgical History:   Procedure Laterality Date    2018    Uterine    EPIDURAL STEROID INJECTION INTO CERVICAL SPINE N/A 2/15/2023    Procedure: Injection-steroid-epidural-cervical;  Surgeon: Adore Goodson MD;  Location: [facility];  Service: Pain Management;  Laterality: N/A;  C7-T1    EPIDURAL STEROID INJECTION INTO CERVICAL SPINE N/A 3/23/2023    Procedure: INJECTION, STEROID, SPINE, CERVICAL, EPIDURAL Intralaminar C7-T1;  Surgeon: Adore Goodson MD;  Location: [facility] OR;  Service: Pain Management;  Laterality: N/A;  Intralaminar C7-T1    TONSILLECTOMY      TUBAL LIGATION       Family History   Problem Relation Age of Onset    Brain cancer Mother     Melanoma Mother     Heart disease Father     Prostate cancer Father     Coronary artery disease Father        Social History     Tobacco Use    Smoking status: Former     Current packs/day: 0.00     Types: Cigarettes     Quit date:      Years since quittin.0     Passive exposure: Never    Smokeless tobacco: Never   Substance Use Topics    Alcohol use: Yes     Alcohol/week: 1.0 standard drink of alcohol     Types: 1 Shots of liquor per week     Comment: 3 times per week     Drug use: Never        Physical Exam      Vital Signs Reviewed   /85 (BP Location: Right arm)   Pulse 76   Temp 98.6 °F (37 °C) (Oral)   Resp 20   Ht 5' 9" (1.753 m)   Wt 90.7 kg (200 lb)   SpO2 98%   BMI 29.53 kg/m²        Procedures    Procedures     Labs     Results for orders placed or performed in visit on 24   POCT " Strep A, Molecular   Result Value Ref Range    Molecular Strep A, POC Negative Negative     Acceptable Yes       Plan:       Sore throat  -     POCT Strep A, Molecular      Strep negative.  Suspect some other type of viral pharyngitis.     May want to try over-the-counter medications too sooth throat, decongestant medicines.     Follow up for further testing if he began to have other symptoms.

## 2024-01-09 NOTE — PATIENT INSTRUCTIONS
Strep negative.  Suspect some other type of viral pharyngitis.     May want to try over-the-counter medications too sooth throat, decongestant medicines.     Follow up for further testing if he began to have other symptoms.

## 2024-03-14 LAB
PAP RECOMMENDATION EXT: NORMAL
PAP SMEAR: NORMAL

## 2024-05-01 DIAGNOSIS — Z13.6 ENCOUNTER FOR SCREENING FOR CARDIOVASCULAR DISORDERS: ICD-10-CM

## 2024-05-01 DIAGNOSIS — Z00.00 WELLNESS EXAMINATION: Primary | ICD-10-CM

## 2024-06-17 ENCOUNTER — OFFICE VISIT (OUTPATIENT)
Dept: URGENT CARE | Facility: CLINIC | Age: 49
End: 2024-06-17
Payer: COMMERCIAL

## 2024-06-17 ENCOUNTER — TELEPHONE (OUTPATIENT)
Dept: URGENT CARE | Facility: CLINIC | Age: 49
End: 2024-06-17

## 2024-06-17 VITALS
BODY MASS INDEX: 30.31 KG/M2 | RESPIRATION RATE: 18 BRPM | TEMPERATURE: 98 F | HEART RATE: 82 BPM | OXYGEN SATURATION: 98 % | SYSTOLIC BLOOD PRESSURE: 133 MMHG | DIASTOLIC BLOOD PRESSURE: 93 MMHG | HEIGHT: 68 IN | WEIGHT: 200 LBS

## 2024-06-17 DIAGNOSIS — S91.309A WOUND OF FOOT: Primary | ICD-10-CM

## 2024-06-17 PROCEDURE — 99213 OFFICE O/P EST LOW 20 MIN: CPT | Mod: ,,,

## 2024-06-17 RX ORDER — CIPROFLOXACIN 500 MG/1
500 TABLET ORAL EVERY 12 HOURS
Qty: 14 TABLET | Refills: 0 | Status: SHIPPED | OUTPATIENT
Start: 2024-06-17 | End: 2024-06-24

## 2024-06-17 RX ORDER — MUPIROCIN 20 MG/G
OINTMENT TOPICAL 3 TIMES DAILY
Qty: 1 EACH | Refills: 1 | Status: SHIPPED | OUTPATIENT
Start: 2024-06-17 | End: 2024-06-27

## 2024-06-17 NOTE — PATIENT INSTRUCTIONS
No obvious foreign body identified on x-ray, will await Radiology over-read.       Take antibiotics until complete.   Wound Care: Twice-three times daily wound care as discussed.   Pain: Take OTC Tylenol or Ibuprofen per package instructions as needed for pain.  Loosen the bandage if needed.   Follow up with your Primary Care Provider within 3-5 days for a recheck.   Present to the Emergency Department for any significant change or worsening symptoms including worsening redness, swelling, purulent discharge, fever, body aches, or chills.

## 2024-06-17 NOTE — PROGRESS NOTES
"Subjective:      Patient ID: Carmita Menjivar is a 48 y.o. female.    Vitals:  height is 5' 8" (1.727 m) and weight is 90.7 kg (200 lb). Her temperature is 98.1 °F (36.7 °C). Her blood pressure is 133/93 (abnormal) and her pulse is 82. Her respiration is 18 and oxygen saturation is 98%.     Chief Complaint: Foot Injury     Patient is a 48 y.o. female who presents to urgent care with complaints of catfish rolando sting that occurred approximately 48 hours ago.  She was in the [facility].  She does report some swelling in surrounding erythema Alleviating factors include N/A with no relief. Patient denies fever, body aches, chills, neck stiffness, nausea, vomiting, diarrhea, drainage from the wound.        Skin:  Positive for erythema.      Objective:     Physical Exam   Constitutional: She is oriented to person, place, and time. She appears well-developed.  Non-toxic appearance. She does not appear ill. No distress.   HENT:   Head: Normocephalic and atraumatic. Head is without abrasion, without contusion and without laceration.   Ears:   Right Ear: External ear normal.   Left Ear: External ear normal.   Nose: Nose normal.   Mouth/Throat: Oropharynx is clear and moist and mucous membranes are normal.   Eyes: Conjunctivae, EOM and lids are normal. Pupils are equal, round, and reactive to light.   Neck: Trachea normal and phonation normal. Neck supple.   Cardiovascular: Normal rate, regular rhythm and normal heart sounds.   Pulmonary/Chest: Effort normal and breath sounds normal. No stridor. No respiratory distress.   Abdominal: Normal appearance.   Musculoskeletal: Normal range of motion.         General: Normal range of motion.   Neurological: She is alert and oriented to person, place, and time.   Skin: Skin is warm, dry, intact and no rash. Capillary refill takes less than 2 seconds. erythema No abrasion, No burn, No bruising and No ecchymosis         Comments: [place] rolando superficial puncture wound with " approximately 7 cm of surrounding erythema & warmth, no purulence, fluctuance or drainage   Psychiatric: Her speech is normal and behavior is normal. Judgment and thought content normal.   Nursing note and vitals reviewed.  X-ray foot: No foreign body appreciated, awaiting radiology over-read    Assessment:     1. Wound of foot        Plan:       Wound of foot  -     XR FOOT COMPLETE 3 VIEW LEFT; Future; Expected date: 06/17/2024  -     ciprofloxacin HCl (CIPRO) 500 MG tablet; Take 1 tablet (500 mg total) by mouth every 12 (twelve) hours. for 7 days  Dispense: 14 tablet; Refill: 0  -     mupirocin (BACTROBAN) 2 % ointment; Apply topically 3 (three) times daily. for 10 days  Dispense: 1 each; Refill: 1           Take antibiotics until complete.   Wound Care: Twice-three times daily wound care as discussed.   Pain: Take OTC Tylenol or Ibuprofen per package instructions as needed for pain.  Loosen the bandage if needed.   Follow up with your Primary Care Provider within 3-5 days for a recheck.   Present to the Emergency Department for any significant change or worsening symptoms including worsening redness, swelling, purulent discharge, fever, body aches, or chills.

## 2024-06-17 NOTE — TELEPHONE ENCOUNTER
Called and informed patient of negative x-ray results per Radiology.  X-ray does not show any acute bony abnormality, foreign body.  Patient verbalized understanding.  Instructed to follow up as directed.

## 2024-06-19 ENCOUNTER — OFFICE VISIT (OUTPATIENT)
Dept: URGENT CARE | Facility: CLINIC | Age: 49
End: 2024-06-19
Payer: COMMERCIAL

## 2024-06-19 VITALS
OXYGEN SATURATION: 99 % | SYSTOLIC BLOOD PRESSURE: 129 MMHG | DIASTOLIC BLOOD PRESSURE: 87 MMHG | RESPIRATION RATE: 18 BRPM | HEART RATE: 72 BPM | TEMPERATURE: 98 F | BODY MASS INDEX: 30.41 KG/M2 | WEIGHT: 200 LBS

## 2024-06-19 DIAGNOSIS — S91.332A PUNCTURE WOUND OF LEFT FOOT, INITIAL ENCOUNTER: Primary | ICD-10-CM

## 2024-06-19 PROCEDURE — 99213 OFFICE O/P EST LOW 20 MIN: CPT | Mod: ,,, | Performed by: FAMILY MEDICINE

## 2024-06-19 NOTE — PATIENT INSTRUCTIONS
Plan:   Bruising is expected after a puncture wound injury from a catfish rolando especially given the venom can cause local inflammation and necrosis.    Given that there has been improvement in pain and swelling I would stay the course.  Continue washing it area with antibacterial soap followed by the Bactroban ointment twice a day.  While the wound is still open keep covered during the day and then open to air in the evenings.  Finish the antibiotics  If you develop fever, increasing pain in the foot, swelling of the foot, redness developing around the wound and spreading, pus drainage from the wound, return to clinic or seek medical attention immediately.  If there is any concerns we can always refer to podiatry if needed.

## 2024-06-19 NOTE — PROGRESS NOTES
Subjective:      Patient ID: Carmita Menjivar is a 48 y.o. female.    Vitals:  weight is 90.7 kg (200 lb). Her tympanic temperature is 98.3 °F (36.8 °C). Her blood pressure is 129/87 and her pulse is 72. Her respiration is 18 and oxygen saturation is 99%.     Chief Complaint: Foot Injury        48-year-old female presents to clinic for follow-up of the left foot injury she sustained by a catfish.  Patient sustained the injury Saturday 4 days ago, was seen 2 days ago in clinic.  Was given Bactroban and Cipro.  Patient states the swelling has resolved, her pain has improved, denies any fever.  Patient is concerned because a bruise has developed around the puncture site and there has been a thin straw-colored drainage from the area when she pressed on it.  Otherwise patient states all the symptoms have been improving.    Foot Injury       Constitution: Negative.   HENT: Negative.     Cardiovascular: Negative.    Eyes: Negative.    Respiratory: Negative.     Gastrointestinal: Negative.    Genitourinary: Negative.    Musculoskeletal: Negative.    Skin: Negative.  Positive for bruising.   Allergic/Immunologic: Negative.    Neurological: Negative.    Hematologic/Lymphatic: Negative.       Objective:     Physical Exam   Constitutional: She is oriented to person, place, and time.  Non-toxic appearance. She does not appear ill. No distress.   HENT:   Head: Normocephalic and atraumatic.   Abdominal: Normal appearance.   Neurological: She is alert and oriented to person, place, and time.   Skin: Skin is not diaphoretic. bruising (bruise approximately 2 cm x 2 cm overlying the puncture site on the left medial foot near the 1st metatarsal joint.  There is no warmth.  There is no erythema.  There is no fluctuance or induration.  No active drainage. No significant swelling of the foot.)   Psychiatric: Her behavior is normal. Mood, judgment and thought content normal.   Vitals reviewed.         Previous History      Review of  patient's allergies indicates:   Allergen Reactions    [place] Rash       Past Medical History:   Diagnosis Date    DDD (degenerative disc disease), cervical     Foraminal stenosis of cervical region      Current Outpatient Medications   Medication Instructions    biotin 1 mg Cap 1 tablet, Daily    ciprofloxacin HCl (CIPRO) 500 mg, Oral, Every 12 hours    cyclobenzaprine (FLEXERIL) 10 mg, Oral, Nightly    ibuprofen (ADVIL,MOTRIN) 200 mg, Every 6 hours PRN    melatonin 15 mg, Oral, Nightly    multivitamin (THERAGRAN) per tablet 1 tablet, Oral, Daily    mupirocin (BACTROBAN) 2 % ointment Topical (Top), 3 times daily     Past Surgical History:   Procedure Laterality Date    ABLATION      Uterine    EPIDURAL STEROID INJECTION INTO CERVICAL SPINE N/A 2/15/2023    Procedure: Injection-steroid-epidural-cervical;  Surgeon: Adore Goodson MD;  Location: [facility] OR;  Service: Pain Management;  Laterality: N/A;  C7-T1    EPIDURAL STEROID INJECTION INTO CERVICAL SPINE N/A 3/23/2023    Procedure: INJECTION, STEROID, SPINE, CERVICAL, EPIDURAL Intralaminar C7-T1;  Surgeon: Adore Goodson MD;  Location: [facility] OR;  Service: Pain Management;  Laterality: N/A;  Intralaminar C7-T1    TONSILLECTOMY      TUBAL LIGATION       Family History   Problem Relation Name Age of Onset    Brain cancer Mother      Melanoma Mother      Heart disease Father      Prostate cancer Father      Coronary artery disease Father         Social History     Tobacco Use    Smoking status: Former     Current packs/day: 0.00     Types: Cigarettes     Quit date:      Years since quittin.4     Passive exposure: Never    Smokeless tobacco: Never   Substance Use Topics    Alcohol use: Yes     Alcohol/week: 1.0 standard drink of alcohol     Types: 1 Shots of liquor per week     Comment: 3 times per week     Drug use: Never        Physical Exam      Vital Signs Reviewed   /87 (Patient Position: Sitting)   Pulse 72   Temp 98.3 °F (36.8 °C) (Tympanic)    Resp 18   Wt 90.7 kg (200 lb)   SpO2 99%   BMI 30.41 kg/m²        Procedures    Procedures     Labs     Results for orders placed or performed in visit on 01/09/24   POCT Strep A, Molecular   Result Value Ref Range    Molecular Strep A, POC Negative Negative     Acceptable Yes        Assessment:     1. Puncture wound of left foot, initial encounter        Plan:   Bruising is expected after a puncture wound injury from a catfish rolando especially given the venom can cause local inflammation and necrosis.    Given that there has been improvement in pain and swelling I would stay the course.  Continue washing it area with antibacterial soap followed by the Bactroban ointment twice a day.  While the wound is still open keep covered during the day and then open to air in the evenings.  Finish the antibiotics  If you develop fever, increasing pain in the foot, swelling of the foot, redness developing around the wound and spreading, pus drainage from the wound, return to clinic or seek medical attention immediately.  If there is any concerns we can always refer to podiatry if needed.    Puncture wound of left foot, initial encounter

## 2024-09-10 LAB — BCS RECOMMENDATION EXT: NORMAL

## 2024-09-16 ENCOUNTER — OFFICE VISIT (OUTPATIENT)
Dept: ORTHOPEDICS | Facility: CLINIC | Age: 49
End: 2024-09-16
Payer: COMMERCIAL

## 2024-09-16 ENCOUNTER — HOSPITAL ENCOUNTER (OUTPATIENT)
Dept: RADIOLOGY | Facility: CLINIC | Age: 49
Discharge: HOME | End: 2024-09-16
Attending: REHABILITATION UNIT
Payer: COMMERCIAL

## 2024-09-16 VITALS — SYSTOLIC BLOOD PRESSURE: 128 MMHG | OXYGEN SATURATION: 97 % | HEART RATE: 88 BPM | DIASTOLIC BLOOD PRESSURE: 81 MMHG

## 2024-09-16 DIAGNOSIS — M25.512 LEFT SHOULDER PAIN, UNSPECIFIED CHRONICITY: Primary | ICD-10-CM

## 2024-09-16 DIAGNOSIS — M25.512 LEFT SHOULDER PAIN, UNSPECIFIED CHRONICITY: ICD-10-CM

## 2024-09-16 PROCEDURE — 3079F DIAST BP 80-89 MM HG: CPT | Mod: CPTII,,, | Performed by: REHABILITATION UNIT

## 2024-09-16 PROCEDURE — 73030 X-RAY EXAM OF SHOULDER: CPT | Mod: LT,,, | Performed by: REHABILITATION UNIT

## 2024-09-16 PROCEDURE — 3074F SYST BP LT 130 MM HG: CPT | Mod: CPTII,,, | Performed by: REHABILITATION UNIT

## 2024-09-16 PROCEDURE — 99213 OFFICE O/P EST LOW 20 MIN: CPT | Mod: ,,, | Performed by: REHABILITATION UNIT

## 2024-09-16 PROCEDURE — 1159F MED LIST DOCD IN RCRD: CPT | Mod: CPTII,,, | Performed by: REHABILITATION UNIT

## 2024-09-16 RX ORDER — DICLOFENAC SODIUM 75 MG/1
75 TABLET, DELAYED RELEASE ORAL 2 TIMES DAILY
Qty: 28 TABLET | Refills: 0 | Status: SHIPPED | OUTPATIENT
Start: 2024-09-16 | End: 2024-09-30

## 2024-09-16 NOTE — PROGRESS NOTES
Subjective:      Patient ID: Carmita Menjivar is a 49 y.o. female.    Chief Complaint: Pain of the Left Shoulder (Patient is here today for follow up for  left shoulder.  Has been taking no pain medication. Which has not as needed for pain, which does and does not help.  Patient has also been doing home exercise which  has been helping. )    HPI:   Carmita Menjivar is a 49 y.o. female who presents today for initial evaluation of her left shoulder    History of Present Illness  The patient presents for evaluation of left shoulder pain.    She began a new exercise regimen three months ago, which included weight lifting. However, she noticed that increasing the weights seemed to exacerbate her shoulder discomfort. She has since stopped lifting weights for the past few weeks, which has led to a slight improvement in her condition. Despite this, she wishes to resume her exercise routine, starting with lighter weights of 8, 10, or 15 pounds. She experiences pain in the front of her shoulder during certain movements, such as unhooking her bra from behind. She reports no specific injury but notes a general feeling of weakness.    Her elbow is in good condition, having received two injections previously.    She also mentions that lunges and squats sometimes cause her knee to feel bruised, despite no known injury. She reports no catching, clicking, locking, or popping sensations in her knee. Interestingly, she finds that applying pressure to her knee while sitting on the toilet alleviates the pain.    Past Medical History:   Diagnosis Date    DDD (degenerative disc disease), cervical     Foraminal stenosis of cervical region      Past Surgical History:   Procedure Laterality Date    ABLATION 2018    Uterine    EPIDURAL STEROID INJECTION INTO CERVICAL SPINE N/A 2/15/2023    Procedure: Injection-steroid-epidural-cervical;  Surgeon: Adore Goodson MD;  Location: [facility];  Service: Pain Management;  Laterality: N/A;  C7-T1     EPIDURAL STEROID INJECTION INTO CERVICAL SPINE N/A 3/23/2023    Procedure: INJECTION, STEROID, SPINE, CERVICAL, EPIDURAL Intralaminar C7-T1;  Surgeon: Adore Goodson MD;  Location: [facility] OR;  Service: Pain Management;  Laterality: N/A;  Intralaminar C7-T1    TONSILLECTOMY      TUBAL LIGATION       Social History     Socioeconomic History    Marital status:    Tobacco Use    Smoking status: Former     Current packs/day: 0.00     Types: Cigarettes     Quit date:      Years since quittin.7     Passive exposure: Never    Smokeless tobacco: Never   Substance and Sexual Activity    Alcohol use: Yes     Alcohol/week: 1.0 standard drink of alcohol     Types: 1 Shots of liquor per week     Comment: 3 times per week     Drug use: Never    Sexual activity: Yes     Partners: Male         Current Outpatient Medications:     ibuprofen (ADVIL,MOTRIN) 200 MG tablet, Take 200 mg by mouth every 6 (six) hours as needed for Pain., Disp: , Rfl:     melatonin 10 mg Tab, Take 15 mg by mouth every evening., Disp: , Rfl:     multivitamin (THERAGRAN) per tablet, Take 1 tablet by mouth once daily., Disp: , Rfl:     biotin 1 mg Cap, Take 1 tablet by mouth Daily. (Patient not taking: Reported on 2024), Disp: , Rfl:     cyclobenzaprine (FLEXERIL) 10 MG tablet, Take 1 tablet (10 mg total) by mouth every evening. (Patient not taking: Reported on 2024), Disp: 30 tablet, Rfl: 2  Review of patient's allergies indicates:   Allergen Reactions    [place] Rash       /81   Pulse 88   SpO2 97%     Comprehensive review of systems completed and negative except as per HPI.        Objective:   Head: Normocephalic, without obvious abnormality, atraumatic  Eyes: conjunctivae/corneas clear. EOM's intact  Ears: normal external appearance  Nose: Nares normal. Septum midline. Mucosa normal. No drainage  Throat: normal findings: lips normal without lesions  Lungs: unlabored breathing on room air  Chest wall: symmetric chest  rise  Heart: regular rate and rhythm  Pulses: 2+ and symmetric  Skin: Skin color, texture, turgor normal. No rashes or lesions  Neurologic: Grossly normal    left SHOULDER    Appearance:   normal    Tenderness:   Anterior    AROM:   , Abduction 170, ER 60, IR T10    PROM:  same    Pain:  AROM: Positive  PROM:  Positive  End ROM: Negative  Supraspinatus strength testing: Negative  External rotation strength testing: Negative  Marimar-scapular: Negative  Virtually all provacative maneuvers Negative    Strength:  Supraspinatus: intact  External rotation: intact  Marimar-scapular: intact    Provocative Maneuvers:     Rotator Cuff/Biceps/AC Joint  Neer's Sign: Negative  Hawkin's Test: Negative  Painful arc: Negative  Belly Press: Negative  Bear Hugger Test: Negative  Hornblower's Sign: Negative  Speed's Test: Positive  Yergeson's Test: Positive  Cross Arm Abduction: Negative    Pulses: Palpable radial pulse    Neurological deficits: None    The patient has a warm and well-perfused upper extremity with capillary refill less than 2 seconds. Sensation is intact to light touch in terminal nerve distributions. 5/5 ain/pin/uln. The patient has no palpable epitrochlear lymphadenopathy.      Assessment:     Imaging:   Four view radiographs of the left shoulder obtained today personally reviewed showing no acute fractures or dislocations.  Joint spaces are well maintained.  Humeral head remains seated centrally within the glenoid.        1. Left shoulder pain, unspecified chronicity          Plan:       Orders Placed This Encounter    X-Ray Shoulder 2 or More Views Left        Imaging and exam findings discussed.     Assessment & Plan  1. Left shoulder pain.  The symptoms suggest biceps inflammation due to repetitive stretching motions. X-ray results are normal, indicating no bone abnormalities. Rest and anti-inflammatory medication were recommended. She was advised to gradually reintroduce activities with body weight, low pound,  low rep exercises and progress as able. Physical therapy and stretching exercises were suggested. If the pain persists, further diagnostic measures will be considered.    2. Left knee pain.  The pain is likely due to joint line tenderness and possible mild patellar tendon inflammation from increased activity. No injury was reported. Anti-inflammatory medication was recommended. She was advised to avoid deep squats and to perform exercises that strengthen the core, hips, and quads. If the pain persists or if there is catching and clicking, x-rays will be considered. A shot is an option if the pain becomes more problematic.    All questions were answered. Patient happy and in agreement with the plan.     This note was generated with the assistance of ambient listening technology. Verbal consent was obtained by the patient and accompanying visitor(s) for the recording of patient appointment to facilitate this note. I attest to having reviewed and edited the generated note for accuracy, though some syntax or spelling errors may persist. Please contact the author of this note for any clarification.

## 2024-10-20 ENCOUNTER — OFFICE VISIT (OUTPATIENT)
Dept: URGENT CARE | Facility: CLINIC | Age: 49
End: 2024-10-20
Payer: COMMERCIAL

## 2024-10-20 VITALS
SYSTOLIC BLOOD PRESSURE: 125 MMHG | HEART RATE: 97 BPM | BODY MASS INDEX: 30.82 KG/M2 | TEMPERATURE: 98 F | DIASTOLIC BLOOD PRESSURE: 89 MMHG | WEIGHT: 203.38 LBS | HEIGHT: 68 IN | RESPIRATION RATE: 20 BRPM | OXYGEN SATURATION: 98 %

## 2024-10-20 DIAGNOSIS — H10.32 ACUTE CONJUNCTIVITIS OF LEFT EYE, UNSPECIFIED ACUTE CONJUNCTIVITIS TYPE: Primary | ICD-10-CM

## 2024-10-20 PROCEDURE — 99213 OFFICE O/P EST LOW 20 MIN: CPT | Mod: ,,, | Performed by: FAMILY MEDICINE

## 2024-10-20 RX ORDER — OFLOXACIN 3 MG/ML
1 SOLUTION/ DROPS OPHTHALMIC 4 TIMES DAILY
Qty: 10 ML | Refills: 0 | Status: SHIPPED | OUTPATIENT
Start: 2024-10-20 | End: 2024-10-27

## 2024-10-20 NOTE — PROGRESS NOTES
"Subjective:      Patient ID: Carmita Menjivar is a 49 y.o. female.    Vitals:  height is 5' 8" (1.727 m) and weight is 92.3 kg (203 lb 6.4 oz). Her oral temperature is 98.2 °F (36.8 °C). Her blood pressure is 125/89 and her pulse is 97. Her respiration is 20 and oxygen saturation is 98%.     Chief Complaint: Conjunctivitis     Patient is a 49 y.o. female who presents to urgent care with complaints of water left eye, photosensitivity, redness, x2 days.  She says symptoms started when she was applying spray sunscreen and may have gotten into her eye.  She then has been rubbing and scratching her eye.  She has irrigated her eye multiple times with saline, which provided relief.  Alleviating factors include steroid eye drops with no relief. Patient denies fever, vision changes, blurred vision, eye pain.  Pt suspects pink eye.  She does wear contacts, but is currently wearing her glasses.         Constitution: Negative for chills, sweating, fatigue and fever.   HENT: Negative.     Neck: neck negative.   Cardiovascular: Negative.    Eyes:  Positive for eye discharge, eye itching and eye redness. Negative for eye trauma, foreign body in eye, eye pain, double vision, blurred vision and eyelid swelling.   Respiratory: Negative.     Gastrointestinal: Negative.    Endocrine: negative.   Genitourinary: Negative.    Musculoskeletal: Negative.    Skin: Negative.    Allergic/Immunologic: Negative.    Neurological: Negative.  Negative for disorientation and altered mental status.   Hematologic/Lymphatic: Negative.    Psychiatric/Behavioral:  Negative for altered mental status, disorientation and confusion.       Objective:     Physical Exam   Constitutional: She is oriented to person, place, and time. She appears well-developed.   HENT:   Head: Normocephalic and atraumatic.   Ears:   Right Ear: External ear normal.   Left Ear: External ear normal.   Nose: Nose normal.   Mouth/Throat: Oropharynx is clear and moist.   Eyes: " Conjunctivae, EOM and lids are normal. Pupils are equal, round, and reactive to light. Right eye exhibits no discharge. Left eye exhibits discharge. Extraocular movement intact   Neck: Trachea normal and phonation normal. Neck supple.   Musculoskeletal: Normal range of motion.         General: Normal range of motion.   Neurological: She is alert and oriented to person, place, and time.   Skin: Skin is warm, dry and intact.   Psychiatric: Her speech is normal and behavior is normal. Judgment and thought content normal.   Nursing note and vitals reviewed.         Previous History      Review of patient's allergies indicates:   Allergen Reactions    Gopi Rash       Past Medical History:   Diagnosis Date    DDD (degenerative disc disease), cervical     Foraminal stenosis of cervical region      Current Outpatient Medications   Medication Instructions    biotin 1 mg Cap 1 tablet, Daily    cyclobenzaprine (FLEXERIL) 10 mg, Oral, Nightly    ibuprofen (ADVIL,MOTRIN) 200 mg, Oral, Every 6 hours PRN    melatonin 15 mg, Oral, Nightly    multivitamin (THERAGRAN) per tablet 1 tablet, Oral, Daily    ofloxacin (OCUFLOX) 0.3 % ophthalmic solution 1 drop, Left Eye, 4 times daily     Past Surgical History:   Procedure Laterality Date    ABLATION  2018    Uterine    EPIDURAL STEROID INJECTION INTO CERVICAL SPINE N/A 02/15/2023    Procedure: Injection-steroid-epidural-cervical;  Surgeon: Adore Goodson MD;  Location: [facility] OR;  Service: Pain Management;  Laterality: N/A;  C7-T1    EPIDURAL STEROID INJECTION INTO CERVICAL SPINE N/A 03/23/2023    Procedure: INJECTION, STEROID, SPINE, CERVICAL, EPIDURAL Intralaminar C7-T1;  Surgeon: Adore Goodson MD;  Location: [facility] OR;  Service: Pain Management;  Laterality: N/A;  Intralaminar C7-T1    TONSILLECTOMY      TUBAL LIGATION       Family History   Problem Relation Name Age of Onset    Brain cancer Mother      Melanoma Mother      Heart disease Father      Prostate cancer Father       "Coronary artery disease Father         Social History     Tobacco Use    Smoking status: Former     Current packs/day: 0.00     Types: Cigarettes     Quit date:      Years since quittin.8     Passive exposure: Never    Smokeless tobacco: Never   Substance Use Topics    Alcohol use: Yes     Alcohol/week: 5.0 standard drinks of alcohol     Types: 5 Glasses of wine per week     Comment: 3 times per week     Drug use: Never        Physical Exam      Vital Signs Reviewed   /89 (BP Location: Left arm, Patient Position: Sitting)   Pulse 97   Temp 98.2 °F (36.8 °C) (Oral)   Resp 20   Ht 5' 8" (1.727 m)   Wt 92.3 kg (203 lb 6.4 oz)   SpO2 98%   BMI 30.93 kg/m²        Procedures    Procedures     Labs     Results for orders placed or performed in visit on 24   POCT Strep A, Molecular    Collection Time: 24 12:18 PM   Result Value Ref Range    Molecular Strep A, POC Negative Negative     Acceptable Yes       Assessment:     1. Acute conjunctivitis of left eye, unspecified acute conjunctivitis type      Vision Screening    Right eye Left eye Both eyes   Without correction      With correction 20/30 20*20 20/15       Plan:   Medication sent to pharmacy take as prescribed  Wash your hands often especially before and after you apply your eye medication.    Use a moist washcloth to remove any crust.  Can use a mild baby shampoo as well.  Wipe from the inside corner of the eye to the outside.  Put a cool or warm wet cloth on your eye a few times a day to help with pain and swelling.  Do not wear contact lenses or eye makeup until pink eye is resolved.  Throw away all eye makeup.  Once pink eye has resolved, make sure to use all new eye makeup.   Do not share towels, pillows, or washcloth until your pink eye has resolved.  Call or seek immediate medical attention if you have new or worsening pain in your eye, you have a change in vision or vision loss, you have an increased amount of " discharge from the eye, your eye has not started to improve once you begin antibiotics, or if you have any new or worrisome symptoms that arise at home.     Acute conjunctivitis of left eye, unspecified acute conjunctivitis type    Other orders  -     ofloxacin (OCUFLOX) 0.3 % ophthalmic solution; Place 1 drop into the left eye 4 (four) times daily. for 7 days  Dispense: 10 mL; Refill: 0

## 2024-10-20 NOTE — PATIENT INSTRUCTIONS
Medication sent to pharmacy take as prescribed  Wash your hands often especially before and after you apply your eye medication.    Use a moist washcloth to remove any crust.  Can use a mild baby shampoo as well.  Wipe from the inside corner of the eye to the outside.  Put a cool or warm wet cloth on your eye a few times a day to help with pain and swelling.  Do not wear contact lenses or eye makeup until pink eye is resolved.  Throw away all eye makeup.  Once pink eye has resolved, make sure to use all new eye makeup.   Do not share towels, pillows, or washcloth until your pink eye has resolved.  Call or seek immediate medical attention if you have new or worsening pain in your eye, you have a change in vision or vision loss, you have an increased amount of discharge from the eye, your eye has not started to improve once you begin antibiotics, or if you have any new or worrisome symptoms that arise at home.

## 2024-12-09 ENCOUNTER — DOCUMENTATION ONLY (OUTPATIENT)
Dept: PRIMARY CARE CLINIC | Facility: CLINIC | Age: 49
End: 2024-12-09
Payer: COMMERCIAL

## 2024-12-12 ENCOUNTER — OFFICE VISIT (OUTPATIENT)
Dept: PRIMARY CARE CLINIC | Facility: CLINIC | Age: 49
End: 2024-12-12
Payer: COMMERCIAL

## 2024-12-12 VITALS
BODY MASS INDEX: 31.37 KG/M2 | HEART RATE: 88 BPM | OXYGEN SATURATION: 99 % | HEIGHT: 68 IN | SYSTOLIC BLOOD PRESSURE: 126 MMHG | WEIGHT: 207 LBS | DIASTOLIC BLOOD PRESSURE: 80 MMHG | TEMPERATURE: 98 F | RESPIRATION RATE: 18 BRPM

## 2024-12-12 DIAGNOSIS — Z13.6 ENCOUNTER FOR SCREENING FOR CARDIOVASCULAR DISORDERS: ICD-10-CM

## 2024-12-12 DIAGNOSIS — M50.30 DDD (DEGENERATIVE DISC DISEASE), CERVICAL: ICD-10-CM

## 2024-12-12 DIAGNOSIS — Z00.00 WELLNESS EXAMINATION: Primary | ICD-10-CM

## 2024-12-12 DIAGNOSIS — E78.2 MIXED HYPERLIPIDEMIA: ICD-10-CM

## 2024-12-12 PROCEDURE — 3079F DIAST BP 80-89 MM HG: CPT | Mod: CPTII,,, | Performed by: STUDENT IN AN ORGANIZED HEALTH CARE EDUCATION/TRAINING PROGRAM

## 2024-12-12 PROCEDURE — 99396 PREV VISIT EST AGE 40-64: CPT | Mod: ,,, | Performed by: STUDENT IN AN ORGANIZED HEALTH CARE EDUCATION/TRAINING PROGRAM

## 2024-12-12 PROCEDURE — 3074F SYST BP LT 130 MM HG: CPT | Mod: CPTII,,, | Performed by: STUDENT IN AN ORGANIZED HEALTH CARE EDUCATION/TRAINING PROGRAM

## 2024-12-12 PROCEDURE — 3008F BODY MASS INDEX DOCD: CPT | Mod: CPTII,,, | Performed by: STUDENT IN AN ORGANIZED HEALTH CARE EDUCATION/TRAINING PROGRAM

## 2024-12-12 PROCEDURE — 1159F MED LIST DOCD IN RCRD: CPT | Mod: CPTII,,, | Performed by: STUDENT IN AN ORGANIZED HEALTH CARE EDUCATION/TRAINING PROGRAM

## 2024-12-12 PROCEDURE — 1160F RVW MEDS BY RX/DR IN RCRD: CPT | Mod: CPTII,,, | Performed by: STUDENT IN AN ORGANIZED HEALTH CARE EDUCATION/TRAINING PROGRAM

## 2024-12-12 NOTE — PROGRESS NOTES
Chief Complaint  Chief Complaint   Patient presents with    Annual Exam     Pap smear 3/2024, mammogram 9/2024, colonoscopy 2023 due 2033, will return for fasting labs        HPI  Summer BISI Menjivar is a 49 y.o. female with medical diagnoses as listed in the medical history and problem list that presents for for wellness examination.  Current and past medical history reviewed.  Pertinent family and social history reviewed.    CRC screening: C-scope 2023  Cervical cancer screening:UTD 03/2024  Breast cancer screening:  UTD 09/2024    Vaccinations due. vaccination status reviewed, if due and if desired, vaccinations provided and given     Denies any new complaints today. She underwent treatment with EMPF and neck pain has improved significantly.       Health Maintenance         Date Due Completion Date    HIV Screening 12/14/2025 (Originally 9/13/1990) ---    Mammogram 09/10/2025 9/10/2024    Hemoglobin A1c (Diabetic Prevention Screening) 10/05/2026 10/5/2023    Cervical Cancer Screening 03/14/2027 3/14/2024    Lipid Panel 10/05/2028 10/5/2023    TETANUS VACCINE 05/22/2033 5/22/2023    Colorectal Cancer Screening 10/30/2033 10/30/2023    RSV Vaccine (Age 60+ and Pregnant patients) (1 - 1-dose 75+ series) 09/13/2050 ---            ALLERGIES AND MEDICATIONS: updated and reviewed.  Review of patient's allergies indicates:   Allergen Reactions    Green Grass Rash     Current Outpatient Medications   Medication Sig Dispense Refill    ibuprofen (ADVIL,MOTRIN) 200 MG tablet Take 200 mg by mouth every 6 (six) hours as needed for Pain.      melatonin 10 mg Tab Take 15 mg by mouth every evening.      multivitamin (THERAGRAN) per tablet Take 1 tablet by mouth once daily.      biotin 1 mg Cap Take 1 tablet by mouth Daily.      cyclobenzaprine (FLEXERIL) 10 MG tablet Take 1 tablet (10 mg total) by mouth every evening. 30 tablet 2     No current facility-administered medications for this visit.       Histories are reviewed and updated as  "appropriate     Review of Systems  Comprehensive review of system performed- negative except noted in HPI       Objective:   Vitals:    12/12/24 1050   BP: 126/80   BP Location: Left arm   Patient Position: Sitting   Pulse: 88   Resp: 18   Temp: 98.3 °F (36.8 °C)   TempSrc: Oral   SpO2: 99%   Weight: 93.9 kg (207 lb)   Height: 5' 8" (1.727 m)    Body mass index is 31.47 kg/m².  Physical Exam  Vitals and nursing note reviewed.   Constitutional:       General: She is not in acute distress.     Appearance: Normal appearance.   HENT:      Head: Normocephalic and atraumatic.      Mouth/Throat:      Mouth: Mucous membranes are moist.      Pharynx: Oropharynx is clear.   Eyes:      Extraocular Movements: Extraocular movements intact.      Conjunctiva/sclera: Conjunctivae normal.   Cardiovascular:      Rate and Rhythm: Normal rate and regular rhythm.   Pulmonary:      Effort: Pulmonary effort is normal.      Breath sounds: Normal breath sounds.   Abdominal:      General: Abdomen is flat. Bowel sounds are normal.      Palpations: Abdomen is soft.   Musculoskeletal:         General: No swelling, tenderness or deformity. Normal range of motion.   Skin:     General: Skin is warm and dry.   Neurological:      General: No focal deficit present.      Mental Status: She is alert and oriented to person, place, and time. Mental status is at baseline.   Psychiatric:         Mood and Affect: Mood normal.         Behavior: Behavior normal.           Assessment & Plan  1. Wellness examination  Overall health status was reviewed   Good health habits reinforced   Labs pending   Appropriate recommendations and preventative care medical information provided, with annual wellness exam encouraged.       RTC in annually for wellness    "

## 2024-12-12 NOTE — LETTER
AUTHORIZATION FOR RELEASE OF   CONFIDENTIAL INFORMATION    Dear LATOSHA,    We are seeing Carmita Menjivar, date of birth 1975, in the clinic at [facility]. Celeste Acosta MD is the patient's PCP. Carmita Menjivar has an outstanding lab/procedure at the time we reviewed her chart. In order to help keep her health information updated, she has authorized us to request the following medical record(s):        (xx  )  MAMMOGRAM                                      (  )  COLONOSCOPY      (  )  PAP SMEAR                                          (  )  OUTSIDE LAB RESULTS     (  )  DEXA SCAN                                          (  )  EYE EXAM            (  )  FOOT EXAM                                          (  )  ENTIRE RECORD     (  )  OUTSIDE IMMUNIZATIONS                 (  )  _______________         Please fax records to [facility], Nguyen, Camtu T, MD, [phone]           Patient Name: Carmita Menjivar  : 1975  Patient Phone #: [phone]

## 2024-12-12 NOTE — LETTER
AUTHORIZATION FOR RELEASE OF   CONFIDENTIAL INFORMATION    Dear Dr Mohan ,    We are seeing Carmita Menjivar, date of birth 1975, in the clinic at [facility]. Celeste Acosta MD is the patient's PCP. Carmita Menjivar has an outstanding lab/procedure at the time we reviewed her chart. In order to help keep her health information updated, she has authorized us to request the following medical record(s):        (xx  )  MAMMOGRAM                                      (  )  COLONOSCOPY      (xx  )  PAP SMEAR                                          (  )  OUTSIDE LAB RESULTS     (  )  DEXA SCAN                                          (  )  EYE EXAM            (  )  FOOT EXAM                                          (  )  ENTIRE RECORD     (  )  OUTSIDE IMMUNIZATIONS                 (  )  _______________         Please fax records to [facility], Nguyen, Camtu T, MD, [phone]       Patient Name: Carmita Menjivar  : 1975  Patient Phone #: [phone]

## 2024-12-30 DIAGNOSIS — Z13.6 ENCOUNTER FOR SCREENING FOR CARDIOVASCULAR DISORDERS: ICD-10-CM

## 2024-12-30 DIAGNOSIS — Z00.00 WELLNESS EXAMINATION: Primary | ICD-10-CM

## 2025-01-15 ENCOUNTER — CLINICAL SUPPORT (OUTPATIENT)
Dept: PRIMARY CARE CLINIC | Facility: CLINIC | Age: 50
End: 2025-01-15
Payer: COMMERCIAL

## 2025-01-15 DIAGNOSIS — Z00.00 WELLNESS EXAMINATION: Primary | ICD-10-CM

## 2025-01-15 DIAGNOSIS — Z13.6 ENCOUNTER FOR SCREENING FOR CARDIOVASCULAR DISORDERS: ICD-10-CM

## 2025-01-15 LAB
ALBUMIN SERPL-MCNC: 4.1 G/DL (ref 3.5–5)
ALBUMIN/GLOB SERPL: 1.2 RATIO (ref 1.1–2)
ALP SERPL-CCNC: 60 UNIT/L (ref 40–150)
ALT SERPL-CCNC: 16 UNIT/L (ref 0–55)
ANION GAP SERPL CALC-SCNC: 5 MEQ/L
AST SERPL-CCNC: 21 UNIT/L (ref 5–34)
BASOPHILS # BLD AUTO: 0.07 X10(3)/MCL
BASOPHILS NFR BLD AUTO: 1.5 %
BILIRUB SERPL-MCNC: 0.6 MG/DL
BUN SERPL-MCNC: 19.9 MG/DL (ref 7–18.7)
CALCIUM SERPL-MCNC: 9.5 MG/DL (ref 8.4–10.2)
CHLORIDE SERPL-SCNC: 106 MMOL/L (ref 98–107)
CHOLEST SERPL-MCNC: 213 MG/DL
CHOLEST/HDLC SERPL: 4 {RATIO} (ref 0–5)
CO2 SERPL-SCNC: 28 MMOL/L (ref 22–29)
CREAT SERPL-MCNC: 0.93 MG/DL (ref 0.55–1.02)
CREAT/UREA NIT SERPL: 21
EOSINOPHIL # BLD AUTO: 0.17 X10(3)/MCL (ref 0–0.9)
EOSINOPHIL NFR BLD AUTO: 3.6 %
ERYTHROCYTE [DISTWIDTH] IN BLOOD BY AUTOMATED COUNT: 11.9 % (ref 11.5–17)
EST. AVERAGE GLUCOSE BLD GHB EST-MCNC: 91.1 MG/DL
GFR SERPLBLD CREATININE-BSD FMLA CKD-EPI: >60 ML/MIN/1.73/M2
GLOBULIN SER-MCNC: 3.4 GM/DL (ref 2.4–3.5)
GLUCOSE SERPL-MCNC: 99 MG/DL (ref 74–100)
HBA1C MFR BLD: 4.8 %
HCT VFR BLD AUTO: 43.4 % (ref 37–47)
HDLC SERPL-MCNC: 59 MG/DL (ref 35–60)
HGB BLD-MCNC: 14.7 G/DL (ref 12–16)
IMM GRANULOCYTES # BLD AUTO: 0.03 X10(3)/MCL (ref 0–0.04)
IMM GRANULOCYTES NFR BLD AUTO: 0.6 %
LDLC SERPL CALC-MCNC: 133 MG/DL (ref 50–140)
LYMPHOCYTES # BLD AUTO: 1.73 X10(3)/MCL (ref 0.6–4.6)
LYMPHOCYTES NFR BLD AUTO: 36.8 %
MCH RBC QN AUTO: 33 PG (ref 27–31)
MCHC RBC AUTO-ENTMCNC: 33.9 G/DL (ref 33–36)
MCV RBC AUTO: 97.5 FL (ref 80–94)
MONOCYTES # BLD AUTO: 0.42 X10(3)/MCL (ref 0.1–1.3)
MONOCYTES NFR BLD AUTO: 8.9 %
NEUTROPHILS # BLD AUTO: 2.28 X10(3)/MCL (ref 2.1–9.2)
NEUTROPHILS NFR BLD AUTO: 48.6 %
NRBC BLD AUTO-RTO: 0 %
PLATELET # BLD AUTO: 208 X10(3)/MCL (ref 130–400)
PMV BLD AUTO: 10.7 FL (ref 7.4–10.4)
POTASSIUM SERPL-SCNC: 4.1 MMOL/L (ref 3.5–5.1)
PROT SERPL-MCNC: 7.5 GM/DL (ref 6.4–8.3)
RBC # BLD AUTO: 4.45 X10(6)/MCL (ref 4.2–5.4)
SODIUM SERPL-SCNC: 139 MMOL/L (ref 136–145)
TRIGL SERPL-MCNC: 107 MG/DL (ref 37–140)
TSH SERPL-ACNC: 2.11 UIU/ML (ref 0.35–4.94)
VLDLC SERPL CALC-MCNC: 21 MG/DL
WBC # BLD AUTO: 4.7 X10(3)/MCL (ref 4.5–11.5)

## 2025-01-15 PROCEDURE — 80061 LIPID PANEL: CPT | Performed by: STUDENT IN AN ORGANIZED HEALTH CARE EDUCATION/TRAINING PROGRAM

## 2025-01-15 PROCEDURE — 36415 COLL VENOUS BLD VENIPUNCTURE: CPT

## 2025-01-15 PROCEDURE — 83036 HEMOGLOBIN GLYCOSYLATED A1C: CPT | Performed by: STUDENT IN AN ORGANIZED HEALTH CARE EDUCATION/TRAINING PROGRAM

## 2025-01-15 PROCEDURE — 36415 COLL VENOUS BLD VENIPUNCTURE: CPT | Mod: ,,, | Performed by: STUDENT IN AN ORGANIZED HEALTH CARE EDUCATION/TRAINING PROGRAM

## 2025-01-15 PROCEDURE — 85025 COMPLETE CBC W/AUTO DIFF WBC: CPT | Performed by: STUDENT IN AN ORGANIZED HEALTH CARE EDUCATION/TRAINING PROGRAM

## 2025-01-15 PROCEDURE — 80053 COMPREHEN METABOLIC PANEL: CPT | Performed by: STUDENT IN AN ORGANIZED HEALTH CARE EDUCATION/TRAINING PROGRAM

## 2025-01-15 PROCEDURE — 84443 ASSAY THYROID STIM HORMONE: CPT | Performed by: STUDENT IN AN ORGANIZED HEALTH CARE EDUCATION/TRAINING PROGRAM

## 2025-02-26 ENCOUNTER — TELEPHONE (OUTPATIENT)
Dept: PRIMARY CARE CLINIC | Facility: CLINIC | Age: 50
End: 2025-02-26
Payer: COMMERCIAL

## 2025-02-26 DIAGNOSIS — M54.12 CERVICAL RADICULITIS: Primary | ICD-10-CM

## 2025-02-26 RX ORDER — CYCLOBENZAPRINE HCL 10 MG
10 TABLET ORAL 3 TIMES DAILY PRN
Qty: 30 TABLET | Refills: 1 | Status: SHIPPED | OUTPATIENT
Start: 2025-02-26

## 2025-02-26 NOTE — TELEPHONE ENCOUNTER
----- Message from Monica sent at 2/26/2025  4:27 PM CST -----  Regarding: refill  Who Called: Carmita MenjivarRefill or New Rx:RefillRX Name and Strength:cyclobenzaprine (FLEXERIL) 10 MG tabletHow is the patient currently taking it? (ex. 1XDay)Is this a 30 day or 90 day RX:Local or Mail OrderList of preferred pharmacies on file (remove unneeded): [id number] - [place] - [facility] [place][note 2][address]If different Pharmacy is requested, enter Pharmacy information here including location and phone number:  Ordering Provider:Preferred Method of Contact: Phone CallPatient's Preferred Phone Number on File: [phone] Best Call Back Number, if different:Additional Information:

## 2025-03-06 ENCOUNTER — OFFICE VISIT (OUTPATIENT)
Facility: CLINIC | Age: 50
End: 2025-03-06
Payer: COMMERCIAL

## 2025-03-06 VITALS
WEIGHT: 207 LBS | HEIGHT: 68 IN | BODY MASS INDEX: 31.37 KG/M2 | TEMPERATURE: 98 F | DIASTOLIC BLOOD PRESSURE: 90 MMHG | SYSTOLIC BLOOD PRESSURE: 130 MMHG | HEART RATE: 86 BPM

## 2025-03-06 DIAGNOSIS — M47.22 CERVICAL SPONDYLOSIS WITH RADICULOPATHY: Primary | Chronic | ICD-10-CM

## 2025-03-06 DIAGNOSIS — M79.18 MYOFASCIAL PAIN SYNDROME: Chronic | ICD-10-CM

## 2025-03-06 RX ORDER — LIDOCAINE HYDROCHLORIDE 10 MG/ML
10 INJECTION, SOLUTION INFILTRATION; PERINEURAL ONCE
Status: SHIPPED | OUTPATIENT
Start: 2025-03-06

## 2025-03-06 NOTE — PROGRESS NOTES
Antonio Hammer M.D.   [facility]  Interventional Pain    Established Patient Visit      Referring provider: No ref. provider found    Chief Complaint   Patient presents with    Neck Pain     Patient states she is here today due to left sided neck pain that radiates down left arm and hand  States that in neck it feels like stabbing then once it radiates down arm it feels like burning   No recent injections beside with Dr. Goodson   Patient is taking flexeril- helps 20%        Assessment and Plan:     Carmita Menjivar is a 49 y.o. female with neck pain. A detailed and lengthy discussion was undertaken regarding the patient's presentation including history,  physical examination, past treatments, relevant laboratory, imaging results and future management strategies:     Assessment: 49 y.o. year old female with        ICD-10-CM ICD-9-CM   1. Cervical spondylosis with radiculopathy  M47.22 721.0   2. Myofascial pain syndrome  M79.18 729.1       The mentioned diagnosis is Worsening  and is accompanied by limitation of ADL's.  Patient's presentation today is pertinent for cervical spondylosis with radiculopathy.  Patient's presentation is suggestive of myofascial pain at this visit that was addressed with trigger point injections which resulted in immediate relief in pain and also range of motion across the cervical spine.  Patient also has pain related to facet arthritis in the lower cervical spine region based on her history and physical examination and this is accompanied by radiculopathy especially along the C7-C8 distribution on the left upper extremity.  Patient is apprehensive to medication management.  Patient will continue other conservative measures including chiropractic treatment, massage therapy, home exercise regimen.  We will evaluate for response to trigger point injections and consider medial branch block at the next office visit.    Cervical spondylosis with radiculopathy    Myofascial  pain syndrome  -     LIDOcaine HCL 10 mg/ml (1%) injection 10 mL      Plan:  The plan was clearly communicated to the patient, who verbalized understanding of the same.     Diagnostics: Reviewed the followed diagnostic results  Labs/EMG: None pertinent  Imaging:   MRI/CT:   01/31/2023 MRI Cervical Spine Impression: Degenerative disc disease and uncovertebral degeneration at C6-C7 with resulting mild spinal canal stenosis and severe bilateral neural foramen stenosis.  Additional much more mild degenerative changes as detailed above with mild left proximal neural foramen stenosis at C4-C5 and mild right neural foramen narrowing at C3-C4.  09/05/2023 MRI Thoracic Spine Impression: Lower cervical and thoracic degenerative changes as detailed above with no acute pathology identified. Mild spinal canal narrowing and high-grade bilateral neural foramen stenosis at C6-C7. Broad-based left paracentral disc protrusion at T8-T9 with resulting mild spinal canal narrowing.  No thoracic foraminal stenosis.  Xray:  01/04/2023 Xray Cervical Spine Impression: Mild cervical spondylosis and facet disease.  No acute fracture or abnormal motion with flexion and extension.  Medications:  Plan/Ordered:  None  Current: Cyclobenzaprine, Ibuprofen  Previous: Diclofenac, Pregabalin  Anticoagulants/Antiplatelets:  None  Non-Pharmacologic  Therapy: Has previously completed physician directed PT for neck pain 11/30/2023 with [facility]  Pain Psychology: None  New Consults:  None  Interventions:  Injections:  Plan/Ordered:   03/06/2025: Trigger point injections performed along paracervical spinal muscles trapezius and rhomboid    Previous: Dr. Goodson, BARRY C7-T1 on 02/15/2023 and 03/23/2023 with Lidocaine and Dexamethasone with minimal pain relief  Information on the procedure was provided to the patient today. Risks and benefits were discussed. All questions were answered.  Surgical Spine Interventions: None  Relevant Risk Factors for  Interventions:  Medical-Surgical History/Devices: Cervical spondylosis, epicondylitis, HLD  Allergies:  None pertinent  Other:   I reviewed the prior notes from each unique source dated 12/12/2024 with Dr. Acosta (Family Medicine) and 09/16/2024 with Dr. Gallardo (Orthopedics)  Old Records: Decision was made to not obtain old records   Follow Up: Plan for patient to follow up  in 4 weeks to assess for efficacy of trigger point injection and consider medial branch blocks if needed be.    Current Visit Imaging Interpretation:  I independently visualized/interpreted the following images dated 01/07/2023 MRI Cervical Spine   Pertinent findings include: Degenerative disc disease and uncovertebral degeneration at C6-C7 with resulting mild spinal canal stenosis and severe bilateral neural foramen stenosis.  Additional much more mild degenerative changes as detailed above with mild left proximal neural foramen stenosis at C4-C5 and mild right neural foramen narrowing at C3-C4.  Key Images:       The above plan and management options were discussed at length with patient. Patient is in agreement with the above and verbalized understanding.    - I discussed the goals of interventional chronic pain management with the patient on today's visit. I explained the utility of injections for diagnostic and therapeutic purposes.  We discussed a multimodal approach to pain including treating the patient's given worst pain at any given time.  We will use a systematic approach to addressing pain.  We will also adopt a multimodal approach that includes injections, adjuvant medications, physical therapy, at times psychiatry.  There may be a limited role for opioid use intermittently in the treatment of pain, more particularly for acute pain although no one approach can be used as a sole treatment modality. I emphasized the importance of regular exercise, core strengthening and stretching, diet and weight loss as a cornerstone of long-term  pain management.    - This condition does not require this patient to take time off of work, and the primary goal of our Pain Management services is to improve the patient's functional capacity.  - Patient Questions: Answered all of the patient's questions regarding diagnoses, therapy, treatment and next steps      History of Present Illness:     03/06/2025    HPI Summary    Context/Inciting Event:  Patient notes most of her pain began a couple of months ago while on a camp visit.  Location:  Neck  Radiation:  Bilaterally upper extremities across C6, C7 distribution   Onset and Progress:  Insidious onset and progressively worsening  Description/Character: aching and boring  Frequency and Timing:  Constant with periods of breakthrough pain  Pain Score:  6/10 on the best day and 8/10 on the worst day  Symptoms Worse With: Extension and movement across the cervical spine  Symptoms Improved With:  Rest    Work Related: no  Profession: Clerical     Associated Conditions:  Numbness: yes  Weakness: yes  Sleep problems: yes  H/o Depression and anxiety: no  Opiate use disorder: no  Inflammatory conditions like Rheumatoid/Ankylosing spondylitis/Psoriatic: no  Functional/Activity Limitations: yes, Symptoms interfere with daily activity, sleeping, and work    Patient denies night fever/night sweats, urinary incontinence, bowel incontinence, significant weight loss, significant motor weakness, and loss of sensations.        Previous Treatments:  Medications:   Current: Cyclobenzaprine, Ibuprofen  Previous: Diclofenac, Pregabalin  Anticoagulants/Antiplatelets: None    Physical Therapy/Home Exercise: yes  Ice/Heat:yes, heat  TENS: yes  Acupuncture: yes  Massage: yes  Chiropractic: yes      Interventional Procedures: Dr. Goodson, BARRY C7-T1 on 02/15/2023 and 03/23/2023 with Lidocaine and Dexamethasone  Spine Surgery: None  Other: None      Summary of Other Notes:  12/12/2024 with Dr. Acosta (Family Medicine)   Assessment &  Plan  1. Wellness examination  Overall health status was reviewed   Good health habits reinforced   Labs pending   Appropriate recommendations and preventative care medical information provided, with annual wellness exam encouraged.    09/16/2024 with Dr. Gallardo (Orthopedics)  History of Present Illness  The patient presents for evaluation of left shoulder pain.     She began a new exercise regimen three months ago, which included weight lifting. However, she noticed that increasing the weights seemed to exacerbate her shoulder discomfort. She has since stopped lifting weights for the past few weeks, which has led to a slight improvement in her condition. Despite this, she wishes to resume her exercise routine, starting with lighter weights of 8, 10, or 15 pounds. She experiences pain in the front of her shoulder during certain movements, such as unhooking her bra from behind. She reports no specific injury but notes a general feeling of weakness.  Her elbow is in good condition, having received two injections previously.  She also mentions that lunges and squats sometimes cause her knee to feel bruised, despite no known injury. She reports no catching, clicking, locking, or popping sensations in her knee. Interestingly, she finds that applying pressure to her knee while sitting on the toilet alleviates the pain.   Assessment & Plan  1. Left shoulder pain.  The symptoms suggest biceps inflammation due to repetitive stretching motions. X-ray results are normal, indicating no bone abnormalities. Rest and anti-inflammatory medication were recommended. She was advised to gradually reintroduce activities with body weight, low pound, low rep exercises and progress as able. Physical therapy and stretching exercises were suggested. If the pain persists, further diagnostic measures will be considered.     2. Left knee pain.  The pain is likely due to joint line tenderness and possible mild patellar tendon inflammation from  increased activity. No injury was reported. Anti-inflammatory medication was recommended. She was advised to avoid deep squats and to perform exercises that strengthen the core, hips, and quads. If the pain persists or if there is catching and clicking, x-rays will be considered. A shot is an option if the pain becomes more problematic.       Pain Disability Index:          3/6/2025     8:04 AM 9/8/2023    11:25 AM 9/1/2023     8:29 AM   Last 3 PDI Scores   Pain Disability Index (PDI) 34 29 35          Review:  Reviewed and consistent with medication use as prescribed.      Review of patient's allergies indicates:   Allergen Reactions    Gopi Rash       Past Medical History:   Diagnosis Date    DDD (degenerative disc disease), cervical     Foraminal stenosis of cervical region        Past Surgical History:   Procedure Laterality Date    ABLATION  2018    Uterine    EPIDURAL STEROID INJECTION INTO CERVICAL SPINE N/A 02/15/2023    Procedure: Injection-steroid-epidural-cervical;  Surgeon: Adore Goodson MD;  Location: [facility] OR;  Service: Pain Management;  Laterality: N/A;  C7-T1    EPIDURAL STEROID INJECTION INTO CERVICAL SPINE N/A 03/23/2023    Procedure: INJECTION, STEROID, SPINE, CERVICAL, EPIDURAL Intralaminar C7-T1;  Surgeon: Adore Goodson MD;  Location: [facility] OR;  Service: Pain Management;  Laterality: N/A;  Intralaminar C7-T1    TONSILLECTOMY      TUBAL LIGATION          reports that she quit smoking about 19 years ago. Her smoking use included cigarettes. She has never been exposed to tobacco smoke. She has never used smokeless tobacco. She reports current alcohol use of about 5.0 standard drinks of alcohol per week. She reports current drug use.    Family History   Problem Relation Name Age of Onset    Brain cancer Mother      Melanoma Mother      Heart disease Father      Prostate cancer Father      Coronary artery disease Father         Current Outpatient Medications   Medication Sig     "cyclobenzaprine (FLEXERIL) 10 MG tablet Take 1 tablet (10 mg total) by mouth 3 (three) times daily as needed for Muscle spasms.    ibuprofen (ADVIL,MOTRIN) 200 MG tablet Take 200 mg by mouth every 6 (six) hours as needed for Pain.    melatonin 10 mg Tab Take 15 mg by mouth every evening.    multivitamin (THERAGRAN) per tablet Take 1 tablet by mouth once daily.     Current Facility-Administered Medications   Medication    LIDOcaine HCL 10 mg/ml (1%) injection 10 mL       Review of Systems:     Review of Systems   Constitutional: Negative.  Negative for chills, diaphoresis and fever.   HENT: Negative.     Eyes: Negative.    Respiratory: Negative.  Negative for shortness of breath and wheezing.    Cardiovascular: Negative.  Negative for chest pain and palpitations.   Gastrointestinal: Negative.  Negative for blood in stool.   Genitourinary: Negative.    Musculoskeletal:         Refer to HPI   Skin: Negative.  Negative for rash.   Neurological:  Negative for tremors and speech change.   Endo/Heme/Allergies: Negative.    Psychiatric/Behavioral: Negative.         PHYSICAL EXAM:   Visit Vitals  BP (!) 130/90 (BP Location: Right arm, Patient Position: Sitting)   Pulse 86   Temp 98.2 °F (36.8 °C)   Ht 5' 8" (1.727 m)   Wt 93.9 kg (207 lb 0.2 oz)   BMI 31.48 kg/m²       General Appearance: healthy, well developed, well nourished, appropriately dressed  Mental Status: Alert, oriented, thought content appropriate; Normal affect  Psych: Mood and affect appropriate  Head: Normocephalic, atraumatic  Eyes: Extraocular movements intact.   Neck: No asymmetry, masses or scars; supple; midline   Skin: Warm and dry; No rashes visible on exposed areas  Lung: Respiratory effort normal, symmetrical chest rise  Cardiovascular: RRR with palpation of the radial artery.    Neuro:     Motor & Sensory:     Strength and Sensation:     Upper Extremity  Nerve C5 C6 C7 C8 T1   Muscle Groups Shoulder Abduction Elbow Flexion (Palm up) Elbow Flexion " (Thumb up) Wrist Extension Elbow Extension Wrist Flexion Hand  Finger Abduction   Right 5/5 5/5 5/5 5/5 5/5 5/5 5/5 5/5   Left 5/5 5/5 5/5 5/5 5/5 5/5 5/5 5/5     Nerve Root (area) Light Touch    R L   C5  (Lateral arm below deltoid) 2 2   C6  (Thumb & Radial hand/forearm) 2 1   C7 (Finger 2,3,4) 2 2   C8 (Finger 5) 2 2   T1 (Medial elbow) 2 2       Reflexes:    R L   Biceps (C5) 2+ 2+   Brachioradialis (C6) 2+ 2+   Triceps (C7) 2+ 2+   Patellar (L4) 2+ 2+   Achilles (S1) 2+ 2+       Upper Tract Signs:    R L   Cohen's Negative Negative   Plantar Response Down-going Down-going   Clonus Negative Negative     Gait:   Heel walking: Intact  Toe walking: Intact  Tandem walking: Intact      MSK:  Cervical Exam  Inspection:  No scars, lesions, step-off deformity noted  Palpation:  Tenderness to palpation along the paraspinal muscles in the cervical region and trapezius and rhomboid  ROM:  Significant limitation to range of motion for extension, bending, rotation movement across the cervical spine minimal for flexion  Special Tests:   Facet Loading:  Positive bilaterally  Spurlings:  Negative bilaterally  Empty can test: Negative bilaterally        Data     Imaging of Relevance:     Results for orders placed in visit on 01/31/23    MRI Cervical Spine Without Contrast    Narrative  EXAMINATION:  MRI CERVICAL SPINE WITHOUT CONTRAST    CLINICAL HISTORY:  47-year-old woman with cervicalgia and cervical radiculitis.    TECHNIQUE:  Sagittal T1, T2, STIR and axial T2 non-contrast 1.5 T magnetic resonance imaging was performed through the cervical spine per routine protocol.    COMPARISON:  None.  Correlation is made with cervical spine radiographs of 01/04/2023.    FINDINGS:  There is anatomic alignment at the cervical spine with no spondylolisthesis.  The cervical and visualized upper thoracic vertebral bodies through the T3 level are normal in morphology and there is no acute or chronic fracture.  There is no significant  marrow signal abnormality or osseous lesion.  No prevertebral or paravertebral soft tissue abnormality is identified.  The visualized posterior fossa contents and cervical and upper thoracic spinal cord are normal in morphology and signal characteristics.  The atlanto-occipital and C1-C2 articulations are normal and there is no stenosis at the foramen magnum.  Additional changes of the cervical spine on a level by level basis are as follows:    C2-C3: No significant abnormality or stenosis.    C3-C4: Mild bilateral uncovertebral degeneration without additional abnormality.  Mild right neural foramen narrowing without spinal canal or left neural foramen stenosis.    C4-C5: Intervertebral disc desiccation without disc bulge or protrusion.  Mild bilateral uncovertebral degeneration with mild left proximal neural foramen stenosis and no right neural foramen stenosis.    C5-C6: Intervertebral disc desiccation and annular bulging without disc protrusion.  Mild bilateral uncovertebral degeneration.  No spinal canal or neural foramen stenosis.    C6-C7: Intervertebral disc desiccation and moderate disc space narrowing with mild anterior endplate osteophyte formation.  Mild disc bulge partially effaces the ventral thecal sac with mild spinal canal narrowing in the midline AP spinal canal measuring just under 9 mm.  There is bilateral uncovertebral degeneration and hypertrophy with severe bilateral neural foramen stenosis.    C7-T1: No significant abnormality or stenosis.    T1-T2 through T3-T4: No significant abnormality or stenosis on sagittal imaging.    Impression  Degenerative disc disease and uncovertebral degeneration at C6-C7 with resulting mild spinal canal stenosis and severe bilateral neural foramen stenosis.  Additional much more mild degenerative changes as detailed above with mild left proximal neural foramen stenosis at C4-C5 and mild right neural foramen narrowing at C3-C4.      Electronically signed  by: Abner Lacho  Date:    01/31/2023  Time:    16:45      09/01/2023 MRI Thoracic Spine Impression: Lower cervical and thoracic degenerative changes as detailed above with no acute pathology identified. Mild spinal canal narrowing and high-grade bilateral neural foramen stenosis at C6-C7. Broad-based left paracentral disc protrusion at T8-T9 with resulting mild spinal canal narrowing.  No thoracic foraminal stenosis.    FINDINGS:  Thoracic spine alignment is anatomic without scoliosis or spondylolisthesis.  The thoracic vertebral bodies are normal in morphology and there is no acute or chronic fracture.  There is Modic type 2 degenerative endplate marrow change at C6-C7.  No additional marrow signal abnormality or osseous lesion.  The thoracic spinal cord is normal in morphology and signal characteristics.  The conus terminates at the L1-L2 intervertebral disc space level and is normal.  No paravertebral soft tissue abnormality is identified.  Additional changes of the spine on a level by level basis are as follows:     C6-C7: Intervertebral disc desiccation and moderate disc space narrowing with mild disc bulging which partially effaces the ventral thecal sac.  There is mild spinal canal narrowing.  There is also bilateral uncovertebral degeneration and hypertrophy with severe bilateral neural foramen stenosis.     C7-T1: No significant abnormality or stenosis.     T1-T2: Mild left degenerative facet arthrosis without additional abnormality or stenosis.     T2-T3: Mild intervertebral disc desiccation without additional abnormality or stenosis.     T3-T4: Minimal/mild right greater than left degenerative facet arthrosis without additional abnormality or stenosis.     T4-T5 through T6-T7: No significant abnormality or stenosis.     T7-T8: Mild intervertebral disc desiccation and annular bulging without additional abnormality or stenosis.     T8-T9: Broad-based left paracentral disc protrusion measures 3-4 x 9 mm in  AP and TV extent and partially effaces the left ventral thecal sac.  There is only mild leftward spinal canal narrowing with no cord impingement.  Minimal/mild left degenerative facet arthrosis and no neural foramen stenosis.     T9-T10: No significant abnormality or stenosis.     T10-T11: Minimal/mild left degenerative facet arthrosis without additional abnormality or stenosis.     T11-T12 through T12-L1: No significant abnormality or stenosis.      Results for orders placed in visit on 01/04/23    X-Ray Cervical Spine Complete 5 view    Narrative  EXAMINATION:  XR CERVICAL SPINE COMPLETE 5 VIEW    CLINICAL HISTORY:  . Cervicalgia    TECHNIQUE:  Seven views obtained.    COMPARISON:  None    FINDINGS:  There is no acute fracture or dislocation.  There is degenerative disc space narrowing at C6-7.  Mild multilevel cervical spondylosis.  The prevertebral soft tissues are normal.  The spinous processes are intact.  No significant motion with flexion and extension.  There is bilateral neural foraminal narrowing at C6-7.  The odontoid process is intact with the visualized lungs are clear.    Impression  Mild cervical spondylosis and facet disease.  No acute fracture or abnormal motion with flexion and extension.      Electronically signed by: Jese Sandoval  Date:    01/04/2023  Time:    15:33    No results found for this or any previous visit.      Labs of Relevance:  Chemistry:  Lab Results   Component Value Date     01/15/2025    K 4.1 01/15/2025    BUN 19.9 (H) 01/15/2025    CREATININE 0.93 01/15/2025    EGFRNORACEVR >60 01/15/2025    GLUCOSE 99 01/15/2025    CALCIUM 9.5 01/15/2025    ALKPHOS 60 01/15/2025    LABPROT 7.5 01/15/2025    ALBUMIN 4.1 01/15/2025    BILIDIR 0.20 11/06/2019    IBILI 0.70 11/06/2019    AST 21 01/15/2025    ALT 16 01/15/2025        Lab Results   Component Value Date    HGBA1C 4.8 01/15/2025    HGBA1C 4.9 10/05/2023    HGBA1C 4.9 05/26/2022        Hematology:  Lab Results   Component  "Value Date    WBC 4.70 01/15/2025    HGB 14.7 01/15/2025    HCT 43.4 01/15/2025     01/15/2025    No results found for: "INR", "PROTIME"      Antonio Hammer MD  Interventional Pain Management  [facility]     Disclaimer:  This note was prepared using voice recognition system and is likely to have sound alike errors that may have been overlooked even after proof reading.  Please call me with any questions    PROCEDURE NOTE  PREPROCEDURE DIAGNOSIS: Bilateral Cervical Myofascial Pain  POSTPROCEDURE DIAGNOSIS: Bilateral Cervical Myofascial Pain    PROCEDURE PERFORMED: Bilateral Trapezius, Rhomboid and Cervical Paraspinal Muscle Trigger Point Injections    ANESTHESIA: Local   COMPLICATIONS: None     INDICATION: The patient has a history of cervical myofacial pain with evidence of taut bands of muscle in the left and right trapezius as well as bilateral cervical paraspinal muscles. These muscle groups are tender to palpation and reproduces the patient's symptoms. The pain is debilitating in nature and causes significant limitations in function for the patient, including limited neck and shoulder range of motion secondary to pain.  The risks and benefits of the procedure were discussed with the patient, all questions were answered and verbal consent was obtained.    PROCEDURE: First the patient was placed in a seated position with the neck and back exposed. Discrete tender points characteristic of the patient's typical pain pattern were palpated and then cleaned with alcohol, twice.  Next, a solution of 10 mL 1% Lidocaine was placed in a 10 mL syringe and attached to a 1/2-inch 27- gauge needle. The needle was then advanced perpendicular to the skin into the muscle belly of each tender point and 1 mL of the local anesthetic was injected after negative aspiration.     A total of 10 injections were performed: 2 in the left trapezius; 2 in the left cervical paraspinals; 1 in the left rhomboid; 2 right " trapezius;  2 in the right cervical paraspinals and 1 in the right rhomboid.  The patient tolerated the procedure well. No complications were noted from the procedure.

## 2025-04-07 ENCOUNTER — OFFICE VISIT (OUTPATIENT)
Facility: CLINIC | Age: 50
End: 2025-04-07
Payer: COMMERCIAL

## 2025-04-07 VITALS
HEART RATE: 95 BPM | HEIGHT: 68 IN | DIASTOLIC BLOOD PRESSURE: 102 MMHG | OXYGEN SATURATION: 99 % | BODY MASS INDEX: 32.58 KG/M2 | SYSTOLIC BLOOD PRESSURE: 123 MMHG | WEIGHT: 215 LBS

## 2025-04-07 DIAGNOSIS — M79.18 MYOFASCIAL PAIN SYNDROME: Chronic | ICD-10-CM

## 2025-04-07 DIAGNOSIS — M47.22 CERVICAL SPONDYLOSIS WITH RADICULOPATHY: Primary | ICD-10-CM

## 2025-04-07 DIAGNOSIS — M79.672 LEFT FOOT PAIN: ICD-10-CM

## 2025-04-07 PROCEDURE — 3008F BODY MASS INDEX DOCD: CPT | Mod: CPTII,,, | Performed by: STUDENT IN AN ORGANIZED HEALTH CARE EDUCATION/TRAINING PROGRAM

## 2025-04-07 PROCEDURE — 3080F DIAST BP >= 90 MM HG: CPT | Mod: CPTII,,, | Performed by: STUDENT IN AN ORGANIZED HEALTH CARE EDUCATION/TRAINING PROGRAM

## 2025-04-07 PROCEDURE — 3074F SYST BP LT 130 MM HG: CPT | Mod: CPTII,,, | Performed by: STUDENT IN AN ORGANIZED HEALTH CARE EDUCATION/TRAINING PROGRAM

## 2025-04-07 PROCEDURE — 3044F HG A1C LEVEL LT 7.0%: CPT | Mod: CPTII,,, | Performed by: STUDENT IN AN ORGANIZED HEALTH CARE EDUCATION/TRAINING PROGRAM

## 2025-04-07 PROCEDURE — 99214 OFFICE O/P EST MOD 30 MIN: CPT | Mod: ,,, | Performed by: STUDENT IN AN ORGANIZED HEALTH CARE EDUCATION/TRAINING PROGRAM

## 2025-04-07 PROCEDURE — 1159F MED LIST DOCD IN RCRD: CPT | Mod: CPTII,,, | Performed by: STUDENT IN AN ORGANIZED HEALTH CARE EDUCATION/TRAINING PROGRAM

## 2025-04-07 NOTE — PROGRESS NOTES
Antonio Hammer M.D.   [facility]  Interventional Pain    Established Patient Visit      Chief Complaint   Patient presents with    Neck Pain     Trigger point injections performed along paracervical spinal muscles trapezius and rhomboid on 03/06/2025.  Patient reports 100% relief on the right side and approximately 85% on the left side.  She is complaining of spasms in the left shoulder.  Pain Scale:  2/10 (burning feeling).  She is experiencing throbbing, pulling pain to the left side of the left foot that shoots up to her ankle.  The pain is intermittent.         Assessment and Plan:     Carmita Menjivar is a 49 y.o. female with neck pain. A detailed and lengthy discussion was undertaken regarding the patient's presentation including history,  physical examination, past treatments, relevant laboratory, imaging results and future management strategies:     Assessment: 49 y.o. year old female with        ICD-10-CM ICD-9-CM   1. Cervical spondylosis with radiculopathy  M47.22 721.0   2. Myofascial pain syndrome  M79.18 729.1   3. Left foot pain  M79.672 729.5       The mentioned diagnosis is Worsening  and is accompanied by limitation of ADL's.  Patient's presentation today is pertinent for cervical spondylosis with radiculopathy.  Patient's presentation is suggestive of myofascial pain at this visit that was addressed with trigger point injections which resulted in immediate relief in pain and also range of motion across the cervical spine.  The relief has persisted at 4 week follow-up visit. Patient also has pain related to facet arthritis in the lower cervical spine region based on her history and physical examination and this is accompanied by radiculopathy especially along the C7-C8 distribution on the left upper extremity. Patient will continue other conservative measures including chiropractic treatment, massage therapy, home exercise regimen.  We will evaluate for response to trigger point  injections and consider medial branch block at the next office visit.    Cervical spondylosis with radiculopathy  -     MRI Cervical Spine Without Contrast; Future; Expected date: 04/07/2025    Myofascial pain syndrome    Left foot pain      Plan:  The plan was clearly communicated to the patient, who verbalized understanding of the same.     Diagnostics: Reviewed the followed diagnostic results  Labs/EMG: None pertinent  Imaging:   MRI/CT:   New order for MRI Cervical Spine ordered. Patient notes this pain is different than it was 2 years ago and has failed home exercise regimen, therapy with chiropractor.   01/31/2023 MRI Cervical Spine Impression: Degenerative disc disease and uncovertebral degeneration at C6-C7 with resulting mild spinal canal stenosis and severe bilateral neural foramen stenosis.  Additional much more mild degenerative changes as detailed above with mild left proximal neural foramen stenosis at C4-C5 and mild right neural foramen narrowing at C3-C4.  09/05/2023 MRI Thoracic Spine Impression: Lower cervical and thoracic degenerative changes as detailed above with no acute pathology identified. Mild spinal canal narrowing and high-grade bilateral neural foramen stenosis at C6-C7. Broad-based left paracentral disc protrusion at T8-T9 with resulting mild spinal canal narrowing.  No thoracic foraminal stenosis.  Xray:  01/04/2023 Xray Cervical Spine Impression: Mild cervical spondylosis and facet disease.  No acute fracture or abnormal motion with flexion and extension.  Medications:  Plan/Ordered:  Recommended the patient to consider diclofenac gel for the pain that is local in the left foot on the outside border.  Current: None  Previous: Diclofenac, Pregabalin, Cyclobenzaprine, Ibuprofen  Anticoagulants/Antiplatelets:  None  Non-Pharmacologic  Therapy: Has previously completed physician directed PT for neck pain 11/30/2023 with [facility]. She still goes to chiropractor once every 2 weeks for her  neck pain, continues to perform massage therapy and home exercises she previously learned.   Pain Psychology: None  New Consults:  None  Interventions:  Injections:  Plan/Ordered:   None    Previous:  03/06/2025: Trigger point injections performed along paracervical spinal muscles trapezius and rhomboid with 100% relief on right and 85% relief on the left with respect to muscular pain. Does note infrequent spasms travelling to shoulders.  Dr. Goodson, BARRY C7-T1 on 02/15/2023 and 03/23/2023 with Lidocaine and Dexamethasone with minimal pain relief  Information on the procedure was provided to the patient today. Risks and benefits were discussed. All questions were answered.  Surgical Spine Interventions: None  Relevant Risk Factors for Interventions:  Medical-Surgical History/Devices: Cervical spondylosis, epicondylitis, HLD  Allergies:  None pertinent  Other:   I reviewed the prior notes from each unique source dated 12/12/2024 with Dr. Acosta (Family Medicine) and 09/16/2024 with Dr. Gallardo (Orthopedics)  Old Records: Decision was made to not obtain old records   Follow Up: Plan for patient to follow up  in 4 weeks to assess for efficacy of trigger point injection and consider medial branch blocks if needed be.    Current Visit Imaging Interpretation:  I independently visualized/interpreted the following images dated 01/07/2023 MRI Cervical Spine   Pertinent findings include: Degenerative disc disease and uncovertebral degeneration at C6-C7 with resulting mild spinal canal stenosis and severe bilateral neural foramen stenosis.  Additional much more mild degenerative changes as detailed above with mild left proximal neural foramen stenosis at C4-C5 and mild right neural foramen narrowing at C3-C4.  Key Images:       The above plan and management options were discussed at length with patient. Patient is in agreement with the above and verbalized understanding.    - I discussed the goals of interventional chronic  pain management with the patient on today's visit. I explained the utility of injections for diagnostic and therapeutic purposes.  We discussed a multimodal approach to pain including treating the patient's given worst pain at any given time.  We will use a systematic approach to addressing pain.  We will also adopt a multimodal approach that includes injections, adjuvant medications, physical therapy, at times psychiatry.  There may be a limited role for opioid use intermittently in the treatment of pain, more particularly for acute pain although no one approach can be used as a sole treatment modality. I emphasized the importance of regular exercise, core strengthening and stretching, diet and weight loss as a cornerstone of long-term pain management.    - This condition does not require this patient to take time off of work, and the primary goal of our Pain Management services is to improve the patient's functional capacity.  - Patient Questions: Answered all of the patient's questions regarding diagnoses, therapy, treatment and next steps    Interval History:     04/07/2025  Pain Progress:  Patient noted significant alleviation in her pain symptoms following the trigger point injections performed at the previous office visit.  She notes 100% relief on the right and 85% on the left with regards to her pain in the shoulders.  Pain Score:2/10  Functionality:  Significant improvement in functionality with regards to cervical spine range of motion.  However patient does note to continue pain symptoms that are suggestive of facet arthritis in the cervical spine.  PDI decreased from 34 to 16.  Medications:  Recommended patient to consider diclofenac gel for the left foot pain that appears local and muscular in origin.  New Diagnostic Labs/Imaging:  New MRI of cervical spine ordered at this office visit.  Patient notes that her current presentation is very different from what it was 2 years ago.  Patient has attempted  and failed at conservative measures.  Therapy:  Patient continues to attend chiropractic therapy and home exercise regimen without significant benefit.  Intervention:  None at this time.      History of Present Illness:     03/06/2025    HPI Summary    Context/Inciting Event:  Patient notes most of her pain began a couple of months ago while on a camp visit.  Location:  Neck  Radiation:  Bilaterally upper extremities across C6, C7 distribution   Onset and Progress:  Insidious onset and progressively worsening  Description/Character: aching and boring  Frequency and Timing:  Constant with periods of breakthrough pain  Pain Score:  6/10 on the best day and 8/10 on the worst day  Symptoms Worse With: Extension and movement across the cervical spine  Symptoms Improved With:  Rest    Work Related: no  Profession: Clerical     Associated Conditions:  Numbness: yes  Weakness: yes  Sleep problems: yes  H/o Depression and anxiety: no  Opiate use disorder: no  Inflammatory conditions like Rheumatoid/Ankylosing spondylitis/Psoriatic: no  Functional/Activity Limitations: yes, Symptoms interfere with daily activity, sleeping, and work    Patient denies night fever/night sweats, urinary incontinence, bowel incontinence, significant weight loss, significant motor weakness, and loss of sensations.        Previous Treatments:  Medications:   Current: Cyclobenzaprine, Ibuprofen  Previous: Diclofenac, Pregabalin  Anticoagulants/Antiplatelets: None    Physical Therapy/Home Exercise: yes  Ice/Heat:yes, heat  TENS: yes  Acupuncture: yes  Massage: yes  Chiropractic: yes      Interventional Procedures: Dr. Goodson, BARRY C7-T1 on 02/15/2023 and 03/23/2023 with Lidocaine and Dexamethasone  Spine Surgery: None  Other: None      Summary of Other Notes:  12/12/2024 with Dr. Acosta (Family Medicine)   Assessment & Plan  1. Wellness examination  Overall health status was reviewed   Good health habits reinforced   Labs pending   Appropriate  recommendations and preventative care medical information provided, with annual wellness exam encouraged.    09/16/2024 with Dr. Gallardo (Orthopedics)  History of Present Illness  The patient presents for evaluation of left shoulder pain.     She began a new exercise regimen three months ago, which included weight lifting. However, she noticed that increasing the weights seemed to exacerbate her shoulder discomfort. She has since stopped lifting weights for the past few weeks, which has led to a slight improvement in her condition. Despite this, she wishes to resume her exercise routine, starting with lighter weights of 8, 10, or 15 pounds. She experiences pain in the front of her shoulder during certain movements, such as unhooking her bra from behind. She reports no specific injury but notes a general feeling of weakness.  Her elbow is in good condition, having received two injections previously.  She also mentions that lunges and squats sometimes cause her knee to feel bruised, despite no known injury. She reports no catching, clicking, locking, or popping sensations in her knee. Interestingly, she finds that applying pressure to her knee while sitting on the toilet alleviates the pain.   Assessment & Plan  1. Left shoulder pain.  The symptoms suggest biceps inflammation due to repetitive stretching motions. X-ray results are normal, indicating no bone abnormalities. Rest and anti-inflammatory medication were recommended. She was advised to gradually reintroduce activities with body weight, low pound, low rep exercises and progress as able. Physical therapy and stretching exercises were suggested. If the pain persists, further diagnostic measures will be considered.     2. Left knee pain.  The pain is likely due to joint line tenderness and possible mild patellar tendon inflammation from increased activity. No injury was reported. Anti-inflammatory medication was recommended. She was advised to avoid deep squats  and to perform exercises that strengthen the core, hips, and quads. If the pain persists or if there is catching and clicking, x-rays will be considered. A shot is an option if the pain becomes more problematic.       Pain Disability Index:          4/7/2025    10:19 AM 3/6/2025     8:04 AM 9/8/2023    11:25 AM   Last 3 PDI Scores   Pain Disability Index (PDI) 16 34 29          Review:  Reviewed and consistent with medication use as prescribed.      Review of patient's allergies indicates:   Allergen Reactions    Gopi Rash       Past Medical History:   Diagnosis Date    DDD (degenerative disc disease), cervical     Foraminal stenosis of cervical region        Past Surgical History:   Procedure Laterality Date    ABLATION  2018    Uterine    EPIDURAL STEROID INJECTION INTO CERVICAL SPINE N/A 02/15/2023    Procedure: Injection-steroid-epidural-cervical;  Surgeon: Adore Goodson MD;  Location: [facility] OR;  Service: Pain Management;  Laterality: N/A;  C7-T1    EPIDURAL STEROID INJECTION INTO CERVICAL SPINE N/A 03/23/2023    Procedure: INJECTION, STEROID, SPINE, CERVICAL, EPIDURAL Intralaminar C7-T1;  Surgeon: Adore Goodson MD;  Location: [facility] OR;  Service: Pain Management;  Laterality: N/A;  Intralaminar C7-T1    TONSILLECTOMY      TUBAL LIGATION          reports that she quit smoking about 19 years ago. Her smoking use included cigarettes. She has never been exposed to tobacco smoke. She has never used smokeless tobacco. She reports current alcohol use of about 5.0 standard drinks of alcohol per week. She reports current drug use.    Family History   Problem Relation Name Age of Onset    Brain cancer Mother      Melanoma Mother      Heart disease Father      Prostate cancer Father      Coronary artery disease Father         Current Outpatient Medications   Medication Sig    cyclobenzaprine (FLEXERIL) 10 MG tablet Take 1 tablet (10 mg total) by mouth 3 (three) times daily as needed for Muscle spasms.    ibuprofen  "(ADVIL,MOTRIN) 200 MG tablet Take 200 mg by mouth every 6 (six) hours as needed for Pain.    melatonin 10 mg Tab Take 15 mg by mouth every evening.    multivitamin (THERAGRAN) per tablet Take 1 tablet by mouth once daily.     Current Facility-Administered Medications   Medication    LIDOcaine HCL 10 mg/ml (1%) injection 10 mL       Review of Systems:     Review of Systems   Constitutional: Negative.  Negative for chills, diaphoresis and fever.   HENT: Negative.     Eyes: Negative.    Respiratory: Negative.  Negative for shortness of breath and wheezing.    Cardiovascular: Negative.  Negative for chest pain and palpitations.   Gastrointestinal: Negative.  Negative for blood in stool.   Genitourinary: Negative.    Musculoskeletal:         Refer to HPI   Skin: Negative.  Negative for rash.   Neurological:  Negative for tremors and speech change.   Endo/Heme/Allergies: Negative.    Psychiatric/Behavioral: Negative.         PHYSICAL EXAM:   Visit Vitals  BP (!) 123/102 (BP Location: Right arm, Patient Position: Sitting)   Pulse 95   Ht 5' 8" (1.727 m)   Wt 97.5 kg (215 lb)   SpO2 99%   BMI 32.69 kg/m²         General Appearance: healthy, well developed, well nourished, appropriately dressed  Mental Status: Alert, oriented, thought content appropriate; Normal affect  Psych: Mood and affect appropriate  Head: Normocephalic, atraumatic  Eyes: Extraocular movements intact.   Neck: No asymmetry, masses or scars; supple; midline   Skin: Warm and dry; No rashes visible on exposed areas  Lung: Respiratory effort normal, symmetrical chest rise  Cardiovascular: RRR with palpation of the radial artery.    Neuro:     Motor & Sensory:     Strength and Sensation:     Upper Extremity  Nerve C5 C6 C7 C8 T1   Muscle Groups Shoulder Abduction Elbow Flexion (Palm up) Elbow Flexion (Thumb up) Wrist Extension Elbow Extension Wrist Flexion Hand  Finger Abduction   Right 5/5 5/5 5/5 5/5 5/5 5/5 5/5 5/5   Left 5/5 5/5 5/5 5/5 5/5 5/5 5/5 " 5/5     Nerve Root (area) Light Touch    R L   C5  (Lateral arm below deltoid) 2 2   C6  (Thumb & Radial hand/forearm) 2 1   C7 (Finger 2,3,4) 2 2   C8 (Finger 5) 2 2   T1 (Medial elbow) 2 2       Reflexes:    R L   Biceps (C5) 2+ 2+   Brachioradialis (C6) 2+ 2+   Triceps (C7) 2+ 2+   Patellar (L4) 2+ 2+   Achilles (S1) 2+ 2+       Upper Tract Signs:    R L   Cohen's Negative Negative   Plantar Response Down-going Down-going   Clonus Negative Negative     Gait:   Heel walking: Intact  Toe walking: Intact  Tandem walking: Intact      MSK:  Cervical Exam  Inspection:  No scars, lesions, step-off deformity noted  Palpation:  Tenderness to palpation along the paraspinal muscles in the cervical region and trapezius and rhomboid  ROM:  Significant limitation to range of motion for extension, bending, rotation movement across the cervical spine minimal for flexion  Special Tests:   Facet Loading:  Positive bilaterally  Spurlings:  Negative bilaterally  Empty can test: Negative bilaterally        Data     Imaging of Relevance:     Results for orders placed in visit on 01/31/23    MRI Cervical Spine Without Contrast    Narrative  EXAMINATION:  MRI CERVICAL SPINE WITHOUT CONTRAST    CLINICAL HISTORY:  47-year-old woman with cervicalgia and cervical radiculitis.    TECHNIQUE:  Sagittal T1, T2, STIR and axial T2 non-contrast 1.5 T magnetic resonance imaging was performed through the cervical spine per routine protocol.    COMPARISON:  None.  Correlation is made with cervical spine radiographs of 01/04/2023.    FINDINGS:  There is anatomic alignment at the cervical spine with no spondylolisthesis.  The cervical and visualized upper thoracic vertebral bodies through the T3 level are normal in morphology and there is no acute or chronic fracture.  There is no significant marrow signal abnormality or osseous lesion.  No prevertebral or paravertebral soft tissue abnormality is identified.  The visualized posterior fossa contents  and cervical and upper thoracic spinal cord are normal in morphology and signal characteristics.  The atlanto-occipital and C1-C2 articulations are normal and there is no stenosis at the foramen magnum.  Additional changes of the cervical spine on a level by level basis are as follows:    C2-C3: No significant abnormality or stenosis.    C3-C4: Mild bilateral uncovertebral degeneration without additional abnormality.  Mild right neural foramen narrowing without spinal canal or left neural foramen stenosis.    C4-C5: Intervertebral disc desiccation without disc bulge or protrusion.  Mild bilateral uncovertebral degeneration with mild left proximal neural foramen stenosis and no right neural foramen stenosis.    C5-C6: Intervertebral disc desiccation and annular bulging without disc protrusion.  Mild bilateral uncovertebral degeneration.  No spinal canal or neural foramen stenosis.    C6-C7: Intervertebral disc desiccation and moderate disc space narrowing with mild anterior endplate osteophyte formation.  Mild disc bulge partially effaces the ventral thecal sac with mild spinal canal narrowing in the midline AP spinal canal measuring just under 9 mm.  There is bilateral uncovertebral degeneration and hypertrophy with severe bilateral neural foramen stenosis.    C7-T1: No significant abnormality or stenosis.    T1-T2 through T3-T4: No significant abnormality or stenosis on sagittal imaging.    Impression  Degenerative disc disease and uncovertebral degeneration at C6-C7 with resulting mild spinal canal stenosis and severe bilateral neural foramen stenosis.  Additional much more mild degenerative changes as detailed above with mild left proximal neural foramen stenosis at C4-C5 and mild right neural foramen narrowing at C3-C4.      Electronically signed by: Abner Monk  Date:    01/31/2023  Time:    16:45      09/01/2023 MRI Thoracic Spine Impression: Lower cervical and thoracic degenerative changes as detailed above  with no acute pathology identified. Mild spinal canal narrowing and high-grade bilateral neural foramen stenosis at C6-C7. Broad-based left paracentral disc protrusion at T8-T9 with resulting mild spinal canal narrowing.  No thoracic foraminal stenosis.    FINDINGS:  Thoracic spine alignment is anatomic without scoliosis or spondylolisthesis.  The thoracic vertebral bodies are normal in morphology and there is no acute or chronic fracture.  There is Modic type 2 degenerative endplate marrow change at C6-C7.  No additional marrow signal abnormality or osseous lesion.  The thoracic spinal cord is normal in morphology and signal characteristics.  The conus terminates at the L1-L2 intervertebral disc space level and is normal.  No paravertebral soft tissue abnormality is identified.  Additional changes of the spine on a level by level basis are as follows:     C6-C7: Intervertebral disc desiccation and moderate disc space narrowing with mild disc bulging which partially effaces the ventral thecal sac.  There is mild spinal canal narrowing.  There is also bilateral uncovertebral degeneration and hypertrophy with severe bilateral neural foramen stenosis.     C7-T1: No significant abnormality or stenosis.     T1-T2: Mild left degenerative facet arthrosis without additional abnormality or stenosis.     T2-T3: Mild intervertebral disc desiccation without additional abnormality or stenosis.     T3-T4: Minimal/mild right greater than left degenerative facet arthrosis without additional abnormality or stenosis.     T4-T5 through T6-T7: No significant abnormality or stenosis.     T7-T8: Mild intervertebral disc desiccation and annular bulging without additional abnormality or stenosis.     T8-T9: Broad-based left paracentral disc protrusion measures 3-4 x 9 mm in AP and TV extent and partially effaces the left ventral thecal sac.  There is only mild leftward spinal canal narrowing with no cord impingement.  Minimal/mild left  "degenerative facet arthrosis and no neural foramen stenosis.     T9-T10: No significant abnormality or stenosis.     T10-T11: Minimal/mild left degenerative facet arthrosis without additional abnormality or stenosis.     T11-T12 through T12-L1: No significant abnormality or stenosis.      Results for orders placed in visit on 01/04/23    X-Ray Cervical Spine Complete 5 view    Narrative  EXAMINATION:  XR CERVICAL SPINE COMPLETE 5 VIEW    CLINICAL HISTORY:  . Cervicalgia    TECHNIQUE:  Seven views obtained.    COMPARISON:  None    FINDINGS:  There is no acute fracture or dislocation.  There is degenerative disc space narrowing at C6-7.  Mild multilevel cervical spondylosis.  The prevertebral soft tissues are normal.  The spinous processes are intact.  No significant motion with flexion and extension.  There is bilateral neural foraminal narrowing at C6-7.  The odontoid process is intact with the visualized lungs are clear.    Impression  Mild cervical spondylosis and facet disease.  No acute fracture or abnormal motion with flexion and extension.      Electronically signed by: Jese Sandoval  Date:    01/04/2023  Time:    15:33    No results found for this or any previous visit.      Labs of Relevance:  Chemistry:  Lab Results   Component Value Date     01/15/2025    K 4.1 01/15/2025    BUN 19.9 (H) 01/15/2025    CREATININE 0.93 01/15/2025    EGFRNORACEVR >60 01/15/2025    GLUCOSE 99 01/15/2025    CALCIUM 9.5 01/15/2025    ALKPHOS 60 01/15/2025    LABPROT 7.5 01/15/2025    ALBUMIN 4.1 01/15/2025    BILIDIR 0.20 11/06/2019    IBILI 0.70 11/06/2019    AST 21 01/15/2025    ALT 16 01/15/2025        Lab Results   Component Value Date    HGBA1C 4.8 01/15/2025    HGBA1C 4.9 10/05/2023    HGBA1C 4.9 05/26/2022        Hematology:  Lab Results   Component Value Date    WBC 4.70 01/15/2025    HGB 14.7 01/15/2025    HCT 43.4 01/15/2025     01/15/2025    No results found for: "INR", "PROTIME"      Antonio Hammer, " MD  Interventional Pain Management  [facility]     Disclaimer:  This note was prepared using voice recognition system and is likely to have sound alike errors that may have been overlooked even after proof reading.  Please call me with any questions

## 2025-04-21 ENCOUNTER — HOSPITAL ENCOUNTER (OUTPATIENT)
Dept: RADIOLOGY | Facility: HOSPITAL | Age: 50
Discharge: HOME | End: 2025-04-21
Attending: STUDENT IN AN ORGANIZED HEALTH CARE EDUCATION/TRAINING PROGRAM
Payer: COMMERCIAL

## 2025-04-21 ENCOUNTER — TELEPHONE (OUTPATIENT)
Dept: PRIMARY CARE CLINIC | Facility: CLINIC | Age: 50
End: 2025-04-21
Payer: COMMERCIAL

## 2025-04-21 DIAGNOSIS — E04.1 THYROID NODULE: Primary | ICD-10-CM

## 2025-04-21 DIAGNOSIS — M47.22 CERVICAL SPONDYLOSIS WITH RADICULOPATHY: ICD-10-CM

## 2025-04-21 PROCEDURE — 72141 MRI NECK SPINE W/O DYE: CPT | Mod: TC

## 2025-04-21 NOTE — TELEPHONE ENCOUNTER
----- Message from Joy sent at 4/21/2025  2:50 PM CDT -----  Who Called: Carmita Jones is requesting assistance/information from provider's office.Symptoms (please be specific):  How long has patient had these symptoms:  List of preferred pharmacies on file (remove unneeded): If different, enter pharmacy into here including location and phone number: Preferred Method of Contact: Phone CallPatient's Preferred Phone Number on File: [phone] Best Call Back Number, if different:Additional Information: Pt calling regarding previous labs that were done. Wants to know if her thyroids were checked. Please advise.

## 2025-04-21 NOTE — TELEPHONE ENCOUNTER
Summer had a c-spine MRI, it picked up a 1.5 cm cystic nodule arising from the inferior pole of the left lobe of the thyroid.   The radiologist is recommending  with thyroid ultrasound.     She would like your thoughts.

## 2025-04-22 ENCOUNTER — TELEPHONE (OUTPATIENT)
Facility: CLINIC | Age: 50
End: 2025-04-22
Payer: COMMERCIAL

## 2025-04-23 ENCOUNTER — TELEPHONE (OUTPATIENT)
Dept: PRIMARY CARE CLINIC | Facility: CLINIC | Age: 50
End: 2025-04-23
Payer: COMMERCIAL

## 2025-04-23 DIAGNOSIS — E04.1 THYROID NODULE: Primary | ICD-10-CM

## 2025-04-23 NOTE — TELEPHONE ENCOUNTER
----- Message from Joy sent at 4/21/2025  2:50 PM CDT -----  Who Called: Carmita Jones is requesting assistance/information from provider's office.Symptoms (please be specific):  How long has patient had these symptoms:  List of preferred pharmacies on file (remove unneeded): If different, enter pharmacy into here including location and phone number: Preferred Method of Contact: Phone CallPatient's Preferred Phone Number on File: [phone] Best Call Back Number, if different:Additional Information: Pt calling regarding previous labs that were done. Wants to know if her thyroids were checked. Please advise.

## 2025-04-24 ENCOUNTER — HOSPITAL ENCOUNTER (OUTPATIENT)
Dept: RADIOLOGY | Facility: HOSPITAL | Age: 50
Discharge: HOME | End: 2025-04-24
Attending: STUDENT IN AN ORGANIZED HEALTH CARE EDUCATION/TRAINING PROGRAM
Payer: COMMERCIAL

## 2025-04-24 DIAGNOSIS — E04.1 THYROID NODULE: ICD-10-CM

## 2025-04-24 PROCEDURE — 76536 US EXAM OF HEAD AND NECK: CPT | Mod: TC

## 2025-04-30 ENCOUNTER — LAB VISIT (OUTPATIENT)
Dept: LAB | Facility: HOSPITAL | Age: 50
End: 2025-04-30
Attending: OTOLARYNGOLOGY
Payer: COMMERCIAL

## 2025-04-30 DIAGNOSIS — E04.1 THYROID NODULE: Primary | ICD-10-CM

## 2025-04-30 DIAGNOSIS — E04.1 THYROID NODULE: ICD-10-CM

## 2025-04-30 LAB — PTH-INTACT SERPL-MCNC: 52.8 PG/ML (ref 8.7–77)

## 2025-04-30 PROCEDURE — 36415 COLL VENOUS BLD VENIPUNCTURE: CPT

## 2025-04-30 PROCEDURE — 83970 ASSAY OF PARATHORMONE: CPT

## 2025-05-07 ENCOUNTER — OFFICE VISIT (OUTPATIENT)
Facility: CLINIC | Age: 50
End: 2025-05-07
Payer: COMMERCIAL

## 2025-05-07 VITALS
HEART RATE: 90 BPM | BODY MASS INDEX: 32.58 KG/M2 | DIASTOLIC BLOOD PRESSURE: 82 MMHG | HEIGHT: 68 IN | OXYGEN SATURATION: 99 % | WEIGHT: 214.94 LBS | SYSTOLIC BLOOD PRESSURE: 115 MMHG

## 2025-05-07 DIAGNOSIS — M79.18 MYOFASCIAL PAIN SYNDROME: Primary | ICD-10-CM

## 2025-05-07 DIAGNOSIS — M47.812 FACET ARTHRITIS OF CERVICAL REGION: ICD-10-CM

## 2025-05-07 PROCEDURE — 3044F HG A1C LEVEL LT 7.0%: CPT | Mod: CPTII,,, | Performed by: STUDENT IN AN ORGANIZED HEALTH CARE EDUCATION/TRAINING PROGRAM

## 2025-05-07 PROCEDURE — 3074F SYST BP LT 130 MM HG: CPT | Mod: CPTII,,, | Performed by: STUDENT IN AN ORGANIZED HEALTH CARE EDUCATION/TRAINING PROGRAM

## 2025-05-07 PROCEDURE — 3008F BODY MASS INDEX DOCD: CPT | Mod: CPTII,,, | Performed by: STUDENT IN AN ORGANIZED HEALTH CARE EDUCATION/TRAINING PROGRAM

## 2025-05-07 PROCEDURE — 3079F DIAST BP 80-89 MM HG: CPT | Mod: CPTII,,, | Performed by: STUDENT IN AN ORGANIZED HEALTH CARE EDUCATION/TRAINING PROGRAM

## 2025-05-07 PROCEDURE — 99214 OFFICE O/P EST MOD 30 MIN: CPT | Mod: 25,,, | Performed by: STUDENT IN AN ORGANIZED HEALTH CARE EDUCATION/TRAINING PROGRAM

## 2025-05-07 PROCEDURE — 1159F MED LIST DOCD IN RCRD: CPT | Mod: CPTII,,, | Performed by: STUDENT IN AN ORGANIZED HEALTH CARE EDUCATION/TRAINING PROGRAM

## 2025-05-07 PROCEDURE — 20553 NJX 1/MLT TRIGGER POINTS 3/>: CPT | Mod: ,,, | Performed by: STUDENT IN AN ORGANIZED HEALTH CARE EDUCATION/TRAINING PROGRAM

## 2025-05-07 NOTE — PROGRESS NOTES
Antonio Hammer M.D.   [facility]  Interventional Pain    Established Patient Visit      Chief Complaint   Patient presents with    Neck Pain     Patient had MRI done   Patient tried diclofenac gel for foot no change  Not taking flexaril- not helping        Assessment and Plan:     Carmita Menjivar is a 49 y.o. female with neck pain. A detailed and lengthy discussion was undertaken regarding the patient's presentation including history,  physical examination, past treatments, relevant laboratory, imaging results and future management strategies:     Assessment: 49 y.o. year old female with        ICD-10-CM ICD-9-CM   1. Myofascial pain syndrome  M79.18 729.1   2. Facet arthritis of cervical region  M47.812 721.0       Myofascial pain syndrome    Facet arthritis of cervical region    The mentioned diagnosis is Worsening  and is accompanied by limitation of ADL's.  Patient's presentation today is pertinent for cervical facet arthritis with infrequent radiculopathy and myofascial pain.  Patient's presentation is suggestive of myofascial pain at this visit that was addressed with trigger point injections which resulted in immediate relief in pain and also range of motion across the cervical spine on the left side.  I had initially performed trigger point injections 8 weeks ago that has resulted in sustained 100% relief on the right side and also suboptimal relief on the left side.  I repeated trigger point injection primarily on the left side at this office visit.  Patient does have facet arthritis of cervical spine at C4-5 and C5-6 on the left side.  If the 2nd trigger point injections on the left side are inadequate to address her symptoms I will consider medial branch block as the next step.  Patient's radicular symptoms with regards to pain traveling down her C6-C7 nerves on the left side and on the right side have subsided with minimal symptoms.    Plan:  The plan was clearly communicated to the  patient, who verbalized understanding of the same.     Diagnostics: Reviewed the followed diagnostic results  Labs/EMG: None pertinent  Imaging:   MRI/CT:   04/21/2025 MRI Cervical Spine Impression: Multilevel cervical spondylosis most pronounced at C6-C7. 1.5 cm cystic nodule arising from the inferior pole of the left lobe of the thyroid.  Recommend correlation with thyroid ultrasound.  09/05/2023 MRI Thoracic Spine Impression: Lower cervical and thoracic degenerative changes as detailed above with no acute pathology identified. Mild spinal canal narrowing and high-grade bilateral neural foramen stenosis at C6-C7. Broad-based left paracentral disc protrusion at T8-T9 with resulting mild spinal canal narrowing.  No thoracic foraminal stenosis.  Xray:  01/04/2023 Xray Cervical Spine Impression: Mild cervical spondylosis and facet disease.  No acute fracture or abnormal motion with flexion and extension.  Medications:  Plan/Ordered:  Recommended the patient to consider diclofenac gel for the pain that is local in the left foot on the outside border.  Current: None  Previous: Diclofenac, Pregabalin, Cyclobenzaprine, Ibuprofen  Anticoagulants/Antiplatelets:  None  Non-Pharmacologic  Therapy: Has previously completed physician directed PT for neck pain 11/30/2023 with [facility]. She still goes to chiropractor once every 2 weeks for her neck pain, continues to perform massage therapy and home exercises she previously learned.   Pain Psychology: None  New Consults:  None  Interventions:  Injections:  Plan/Ordered:   05/07/2025: Repeat TPI on left side for trap, SCM and cervical paraspinal    Previous:  03/06/2025: Trigger point injections performed along paracervical spinal muscles trapezius and rhomboid with 100% relief on right at 8 weeks and 100% relief on the left with respect to muscular pain lasting 2 weeks. Does note infrequent spasms travelling to shoulders.  Dr. Goodson, BARRY C7-T1 on 02/15/2023 and 03/23/2023 with  Lidocaine and Dexamethasone with minimal pain relief  Information on the procedure was provided to the patient today. Risks and benefits were discussed. All questions were answered.  Surgical Spine Interventions: None  Relevant Risk Factors for Interventions:  Medical-Surgical History/Devices: Cervical spondylosis, epicondylitis, HLD  Allergies:  None pertinent  Other:   I reviewed the prior notes from each unique source dated 12/12/2024 with Dr. Acosta (Family Medicine) and 09/16/2024 with Dr. Gallardo (Orthopedics)  Old Records: Decision was made to not obtain old records   Follow Up: Plan for patient to follow up  in 4 weeks to assess for efficacy of trigger point injection and consider medial branch blocks if needed be.    Current Visit Imaging Interpretation:  I independently visualized/interpreted the following images dated  04/21/2025 MRI Cervical Spine   Pertinent findings include: Multilevel cervical spondylosis most pronounced at C6-C7, also starting C4-5, C5-6..   Key Images:       The above plan and management options were discussed at length with patient. Patient is in agreement with the above and verbalized understanding.    - I discussed the goals of interventional chronic pain management with the patient on today's visit. I explained the utility of injections for diagnostic and therapeutic purposes.  We discussed a multimodal approach to pain including treating the patient's given worst pain at any given time.  We will use a systematic approach to addressing pain.  We will also adopt a multimodal approach that includes injections, adjuvant medications, physical therapy, at times psychiatry.  There may be a limited role for opioid use intermittently in the treatment of pain, more particularly for acute pain although no one approach can be used as a sole treatment modality. I emphasized the importance of regular exercise, core strengthening and stretching, diet and weight loss as a cornerstone of  long-term pain management.    - This condition does not require this patient to take time off of work, and the primary goal of our Pain Management services is to improve the patient's functional capacity.  - Patient Questions: Answered all of the patient's questions regarding diagnoses, therapy, treatment and next steps    Interval History:     05/07/2025  Pain Progress:  Patient noted to have sustained 100% relief with trigger point injections in the right side.  Patient has had suboptimal relief at 8 week follow-up on the left side.  Given the presentation I discussed pain generators again and I do believe component of myofascial pain on the left side with cervical facet arthritis.  At this office visit I repeated cervical trigger point injection.  I will assess for pain improvement over the next couple of weeks.  If suboptimal I will consider cervical medial branch block at C4-5 and C5-6 as the next step on the left side.  Pain Score:  5/10  Functionality:  Significantly improved functionality on right neck however decreased on the left side  Medications:  Recommended to consider diclofenac gel for plantar fasciitis and neck pain  New Diagnostic Labs/Imaging:  Discussed MRI findings of cervical spine  Therapy:  None new at this visit  Intervention:  Trigger point injections performed at this visit      04/07/2025  Pain Progress:  Patient noted significant alleviation in her pain symptoms following the trigger point injections performed at the previous office visit.  She notes 100% relief on the right and 85% on the left with regards to her pain in the shoulders.  Pain Score:2/10  Functionality:  Significant improvement in functionality with regards to cervical spine range of motion.  However patient does note to continue pain symptoms that are suggestive of facet arthritis in the cervical spine.  PDI decreased from 34 to 16.  Medications:  Recommended patient to consider diclofenac gel for the left foot pain that  appears local and muscular in origin.  New Diagnostic Labs/Imaging:  New MRI of cervical spine ordered at this office visit.  Patient notes that her current presentation is very different from what it was 2 years ago.  Patient has attempted and failed at conservative measures.  Therapy:  Patient continues to attend chiropractic therapy and home exercise regimen without significant benefit.  Intervention:  None at this time.      History of Present Illness:     03/06/2025    HPI Summary    Context/Inciting Event:  Patient notes most of her pain began a couple of months ago while on a camp visit.  Location:  Neck  Radiation:  Bilaterally upper extremities across C6, C7 distribution   Onset and Progress:  Insidious onset and progressively worsening  Description/Character: aching and boring  Frequency and Timing:  Constant with periods of breakthrough pain  Pain Score:  6/10 on the best day and 8/10 on the worst day  Symptoms Worse With: Extension and movement across the cervical spine  Symptoms Improved With:  Rest    Work Related: no  Profession: Clerical     Associated Conditions:  Numbness: yes  Weakness: yes  Sleep problems: yes  H/o Depression and anxiety: no  Opiate use disorder: no  Inflammatory conditions like Rheumatoid/Ankylosing spondylitis/Psoriatic: no  Functional/Activity Limitations: yes, Symptoms interfere with daily activity, sleeping, and work    Patient denies night fever/night sweats, urinary incontinence, bowel incontinence, significant weight loss, significant motor weakness, and loss of sensations.        Previous Treatments:  Medications:   Current: Cyclobenzaprine, Ibuprofen  Previous: Diclofenac, Pregabalin  Anticoagulants/Antiplatelets: None    Physical Therapy/Home Exercise: yes  Ice/Heat:yes, heat  TENS: yes  Acupuncture: yes  Massage: yes  Chiropractic: yes      Interventional Procedures: Dr. Goodson, BARRY C7-T1 on 02/15/2023 and 03/23/2023 with Lidocaine and Dexamethasone  Spine Surgery:  None  Other: None      Summary of Other Notes:  12/12/2024 with Dr. Acosta (Family Medicine)   Assessment & Plan  1. Wellness examination  Overall health status was reviewed   Good health habits reinforced   Labs pending   Appropriate recommendations and preventative care medical information provided, with annual wellness exam encouraged.    09/16/2024 with Dr. Gallardo (Orthopedics)  History of Present Illness  The patient presents for evaluation of left shoulder pain.     She began a new exercise regimen three months ago, which included weight lifting. However, she noticed that increasing the weights seemed to exacerbate her shoulder discomfort. She has since stopped lifting weights for the past few weeks, which has led to a slight improvement in her condition. Despite this, she wishes to resume her exercise routine, starting with lighter weights of 8, 10, or 15 pounds. She experiences pain in the front of her shoulder during certain movements, such as unhooking her bra from behind. She reports no specific injury but notes a general feeling of weakness.  Her elbow is in good condition, having received two injections previously.  She also mentions that lunges and squats sometimes cause her knee to feel bruised, despite no known injury. She reports no catching, clicking, locking, or popping sensations in her knee. Interestingly, she finds that applying pressure to her knee while sitting on the toilet alleviates the pain.   Assessment & Plan  1. Left shoulder pain.  The symptoms suggest biceps inflammation due to repetitive stretching motions. X-ray results are normal, indicating no bone abnormalities. Rest and anti-inflammatory medication were recommended. She was advised to gradually reintroduce activities with body weight, low pound, low rep exercises and progress as able. Physical therapy and stretching exercises were suggested. If the pain persists, further diagnostic measures will be considered.     2. Left  knee pain.  The pain is likely due to joint line tenderness and possible mild patellar tendon inflammation from increased activity. No injury was reported. Anti-inflammatory medication was recommended. She was advised to avoid deep squats and to perform exercises that strengthen the core, hips, and quads. If the pain persists or if there is catching and clicking, x-rays will be considered. A shot is an option if the pain becomes more problematic.       Pain Disability Index:          5/7/2025    10:52 AM 4/7/2025    10:19 AM 3/6/2025     8:04 AM   Last 3 PDI Scores   Pain Disability Index (PDI) 16 16 34          Review:  Reviewed and consistent with medication use as prescribed.      Review of patient's allergies indicates:   Allergen Reactions    [place] Rash       Past Medical History:   Diagnosis Date    DDD (degenerative disc disease), cervical     Foraminal stenosis of cervical region        Past Surgical History:   Procedure Laterality Date    ABLATION  2018    Uterine    EPIDURAL STEROID INJECTION INTO CERVICAL SPINE N/A 02/15/2023    Procedure: Injection-steroid-epidural-cervical;  Surgeon: Adore Goodson MD;  Location: [facility] OR;  Service: Pain Management;  Laterality: N/A;  C7-T1    EPIDURAL STEROID INJECTION INTO CERVICAL SPINE N/A 03/23/2023    Procedure: INJECTION, STEROID, SPINE, CERVICAL, EPIDURAL Intralaminar C7-T1;  Surgeon: Adore Goodson MD;  Location: [facility] OR;  Service: Pain Management;  Laterality: N/A;  Intralaminar C7-T1    TONSILLECTOMY      TUBAL LIGATION          reports that she quit smoking about 19 years ago. Her smoking use included cigarettes. She has never been exposed to tobacco smoke. She has never used smokeless tobacco. She reports current alcohol use of about 5.0 standard drinks of alcohol per week. She reports current drug use.    Family History   Problem Relation Name Age of Onset    Brain cancer Mother      Melanoma Mother      Heart disease Father      Prostate cancer  "Father      Coronary artery disease Father         Current Outpatient Medications   Medication Sig    ibuprofen (ADVIL,MOTRIN) 200 MG tablet Take 200 mg by mouth every 6 (six) hours as needed for Pain.    melatonin 10 mg Tab Take 15 mg by mouth every evening.    multivitamin (THERAGRAN) per tablet Take 1 tablet by mouth once daily.    cyclobenzaprine (FLEXERIL) 10 MG tablet Take 1 tablet (10 mg total) by mouth 3 (three) times daily as needed for Muscle spasms. (Patient not taking: Reported on 5/7/2025)     Current Facility-Administered Medications   Medication    LIDOcaine HCL 10 mg/ml (1%) injection 10 mL       Review of Systems:     Review of Systems   Constitutional: Negative.  Negative for chills, diaphoresis and fever.   HENT: Negative.     Eyes: Negative.    Respiratory: Negative.  Negative for shortness of breath and wheezing.    Cardiovascular: Negative.  Negative for chest pain and palpitations.   Gastrointestinal: Negative.  Negative for blood in stool.   Genitourinary: Negative.    Musculoskeletal:         Refer to HPI   Skin: Negative.  Negative for rash.   Neurological:  Negative for tremors and speech change.   Endo/Heme/Allergies: Negative.    Psychiatric/Behavioral: Negative.         PHYSICAL EXAM:   Visit Vitals  /82   Pulse 90   Ht 5' 8" (1.727 m)   Wt 97.5 kg (214 lb 15.2 oz)   SpO2 99%   BMI 32.68 kg/m²         General Appearance: healthy, well developed, well nourished, appropriately dressed  Mental Status: Alert, oriented, thought content appropriate; Normal affect  Psych: Mood and affect appropriate  Head: Normocephalic, atraumatic  Eyes: Extraocular movements intact.   Neck: No asymmetry, masses or scars; supple; midline   Skin: Warm and dry; No rashes visible on exposed areas  Lung: Respiratory effort normal, symmetrical chest rise  Cardiovascular: RRR with palpation of the radial artery.    Neuro:     Motor & Sensory:     Strength and Sensation:     Upper Extremity  Nerve C5 C6 C7 C8 " T1   Muscle Groups Shoulder Abduction Elbow Flexion (Palm up) Elbow Flexion (Thumb up) Wrist Extension Elbow Extension Wrist Flexion Hand  Finger Abduction   Right 5/5 5/5 5/5 5/5 5/5 5/5 5/5 5/5   Left 5/5 5/5 5/5 5/5 5/5 5/5 5/5 5/5     Nerve Root (area) Light Touch    R L   C5  (Lateral arm below deltoid) 2 2   C6  (Thumb & Radial hand/forearm) 2 1   C7 (Finger 2,3,4) 2 2   C8 (Finger 5) 2 2   T1 (Medial elbow) 2 2       Reflexes:    R L   Biceps (C5) 2+ 2+   Brachioradialis (C6) 2+ 2+   Triceps (C7) 2+ 2+   Patellar (L4) 2+ 2+   Achilles (S1) 2+ 2+       Upper Tract Signs:    R L   Cohen's Negative Negative   Plantar Response Down-going Down-going   Clonus Negative Negative     Gait:   Heel walking: Intact  Toe walking: Intact  Tandem walking: Intact      MSK:  Cervical Exam  Inspection:  No scars, lesions, step-off deformity noted  Palpation:  Tenderness to palpation along the paraspinal muscles in the cervical region and trapezius and rhomboid  ROM:  Significant limitation to range of motion for extension, bending, rotation movement across the cervical spine minimal for flexion  Special Tests:   Facet Loading:  Positive left side  Spurlings:  Negative bilaterally  Empty can test: Negative bilaterally        Data     Imaging of Relevance:     04/21/2025 MRI Cervical Spine Impression: Multilevel cervical spondylosis most pronounced at C6-C7. 1.5 cm cystic nodule arising from the inferior pole of the left lobe of the thyroid.  Recommend correlation with thyroid ultrasound.    FINDINGS:  CORD: Normal size and signal.  No syrinx.  Cervicomedullary junction is normal.  ALIGNMENT: Normal.  Lateral masses of C1 and C2 are congruent.  BONES: Vertebral body heights are maintained.  No aggressive bone marrow signal.  PARASPINAL AREA: Normal.  Other: 1.5 cm cystic nodule arising exophytically from the inferior pole of the left lobe of thyroid.     CERVICAL DISC LEVELS:   C2-C3: Unremarkable.  C3-C4:  Unremarkable.  C4-C5: Mild right foraminal stenosis due to uncovertebral hypertrophy.  Central canal and left foramina are patent.  C5-C6: Mild bilateral foraminal stenosis due to uncovertebral hypertrophy.  Central canal is patent.  C6-C7: Moderate disc degeneration with mild Modic type 1 endplate changes and shallow broad-based disc osteophyte mildly indenting the ventral thecal sac and causing moderate to severe bilateral foraminal stenosis.  C7-T1: Unremarkable.      09/01/2023 MRI Thoracic Spine Impression: Lower cervical and thoracic degenerative changes as detailed above with no acute pathology identified. Mild spinal canal narrowing and high-grade bilateral neural foramen stenosis at C6-C7. Broad-based left paracentral disc protrusion at T8-T9 with resulting mild spinal canal narrowing.  No thoracic foraminal stenosis.    FINDINGS:  Thoracic spine alignment is anatomic without scoliosis or spondylolisthesis.  The thoracic vertebral bodies are normal in morphology and there is no acute or chronic fracture.  There is Modic type 2 degenerative endplate marrow change at C6-C7.  No additional marrow signal abnormality or osseous lesion.  The thoracic spinal cord is normal in morphology and signal characteristics.  The conus terminates at the L1-L2 intervertebral disc space level and is normal.  No paravertebral soft tissue abnormality is identified.  Additional changes of the spine on a level by level basis are as follows:     C6-C7: Intervertebral disc desiccation and moderate disc space narrowing with mild disc bulging which partially effaces the ventral thecal sac.  There is mild spinal canal narrowing.  There is also bilateral uncovertebral degeneration and hypertrophy with severe bilateral neural foramen stenosis.     C7-T1: No significant abnormality or stenosis.     T1-T2: Mild left degenerative facet arthrosis without additional abnormality or stenosis.     T2-T3: Mild intervertebral disc desiccation  without additional abnormality or stenosis.     T3-T4: Minimal/mild right greater than left degenerative facet arthrosis without additional abnormality or stenosis.     T4-T5 through T6-T7: No significant abnormality or stenosis.     T7-T8: Mild intervertebral disc desiccation and annular bulging without additional abnormality or stenosis.     T8-T9: Broad-based left paracentral disc protrusion measures 3-4 x 9 mm in AP and TV extent and partially effaces the left ventral thecal sac.  There is only mild leftward spinal canal narrowing with no cord impingement.  Minimal/mild left degenerative facet arthrosis and no neural foramen stenosis.     T9-T10: No significant abnormality or stenosis.     T10-T11: Minimal/mild left degenerative facet arthrosis without additional abnormality or stenosis.     T11-T12 through T12-L1: No significant abnormality or stenosis.      Results for orders placed in visit on 01/04/23    X-Ray Cervical Spine Complete 5 view    Narrative  EXAMINATION:  XR CERVICAL SPINE COMPLETE 5 VIEW    CLINICAL HISTORY:  . Cervicalgia    TECHNIQUE:  Seven views obtained.    COMPARISON:  None    FINDINGS:  There is no acute fracture or dislocation.  There is degenerative disc space narrowing at C6-7.  Mild multilevel cervical spondylosis.  The prevertebral soft tissues are normal.  The spinous processes are intact.  No significant motion with flexion and extension.  There is bilateral neural foraminal narrowing at C6-7.  The odontoid process is intact with the visualized lungs are clear.    Impression  Mild cervical spondylosis and facet disease.  No acute fracture or abnormal motion with flexion and extension.      Electronically signed by: Jese Sandoval  Date:    01/04/2023  Time:    15:33    No results found for this or any previous visit.      Labs of Relevance:  Chemistry:  Lab Results   Component Value Date     01/15/2025    K 4.1 01/15/2025    BUN 19.9 (H) 01/15/2025    CREATININE 0.93  "01/15/2025    EGFRNORACEVR >60 01/15/2025    CALCIUM 9.5 01/15/2025    ALKPHOS 60 01/15/2025    ALBUMIN 4.1 01/15/2025    BILIDIR 0.20 11/06/2019    IBILI 0.70 11/06/2019    AST 21 01/15/2025    ALT 16 01/15/2025        Lab Results   Component Value Date    HGBA1C 4.8 01/15/2025    HGBA1C 4.9 10/05/2023    HGBA1C 4.9 05/26/2022        Hematology:  Lab Results   Component Value Date    WBC 4.70 01/15/2025    HGB 14.7 01/15/2025    HCT 43.4 01/15/2025     01/15/2025    No results found for: "INR", "PROTIME"      Antonio Hammer MD  Interventional Pain Management  [facility]     Disclaimer:  This note was prepared using voice recognition system and is likely to have sound alike errors that may have been overlooked even after proof reading.  Please call me with any questions    PROCEDURE NOTE  PREPROCEDURE DIAGNOSIS: Left Cervical Myofascial Pain  POSTPROCEDURE DIAGNOSIS: Left Cervical Myofascial Pain  MUSCLES INJECTED: Left trapezius, paraspinal cervical and sternocleidomastoid  PROCEDURE PERFORMED: Bilateral Trapezius and Cervical Paraspinal Muscle Trigger Point Injections    ANESTHESIA: Local   COMPLICATIONS: None     INDICATION: The patient has a history of cervical myofacial pain with evidence of taut bands of muscle in the left and right trapezius as well as bilateral cervical paraspinal muscles. These muscle groups are tender to palpation and reproduces the patient's symptoms. The pain is debilitating in nature and causes significant limitations in function for the patient, including limited neck and shoulder range of motion secondary to pain.  The risks and benefits of the procedure were discussed with the patient, all questions were answered and verbal consent was obtained.    PROCEDURE: First the patient was placed in a seated position with the neck and back exposed. Discrete tender points characteristic of the patient's typical pain pattern were palpated and then cleaned with alcohol, " twice.  Next, a solution of 10 mL 1% Lidocaine was placed in a 10 mL syringe and attached to a 1/2-inch 27- gauge needle. The needle was then advanced perpendicular to the skin into the muscle belly of each tender point and 1 mL of the local anesthetic was injected after negative aspiration.     A total of 10 injections were performed: 6 in the left trapezius; 3 in the left sternocleidomastoid; 3 left cervical paraspinal  The patient tolerated the procedure well. No complications were noted from the procedure.

## 2025-07-01 ENCOUNTER — PATIENT MESSAGE (OUTPATIENT)
Dept: PRIMARY CARE CLINIC | Facility: CLINIC | Age: 50
End: 2025-07-01

## 2025-07-01 ENCOUNTER — TELEPHONE (OUTPATIENT)
Dept: PRIMARY CARE CLINIC | Facility: CLINIC | Age: 50
End: 2025-07-01

## 2025-07-01 ENCOUNTER — OFFICE VISIT (OUTPATIENT)
Dept: PRIMARY CARE CLINIC | Facility: CLINIC | Age: 50
End: 2025-07-01
Payer: COMMERCIAL

## 2025-07-01 VITALS
WEIGHT: 221 LBS | RESPIRATION RATE: 18 BRPM | SYSTOLIC BLOOD PRESSURE: 124 MMHG | TEMPERATURE: 97 F | OXYGEN SATURATION: 99 % | HEART RATE: 100 BPM | DIASTOLIC BLOOD PRESSURE: 70 MMHG | BODY MASS INDEX: 33.49 KG/M2 | HEIGHT: 68 IN

## 2025-07-01 DIAGNOSIS — E78.2 MIXED HYPERLIPIDEMIA: ICD-10-CM

## 2025-07-01 DIAGNOSIS — E66.1 CLASS 1 DRUG-INDUCED OBESITY WITH SERIOUS COMORBIDITY AND BODY MASS INDEX (BMI) OF 33.0 TO 33.9 IN ADULT: Primary | ICD-10-CM

## 2025-07-01 DIAGNOSIS — E66.811 CLASS 1 DRUG-INDUCED OBESITY WITH SERIOUS COMORBIDITY AND BODY MASS INDEX (BMI) OF 33.0 TO 33.9 IN ADULT: Primary | ICD-10-CM

## 2025-07-01 PROCEDURE — 3074F SYST BP LT 130 MM HG: CPT | Mod: CPTII,,, | Performed by: STUDENT IN AN ORGANIZED HEALTH CARE EDUCATION/TRAINING PROGRAM

## 2025-07-01 PROCEDURE — 3044F HG A1C LEVEL LT 7.0%: CPT | Mod: CPTII,,, | Performed by: STUDENT IN AN ORGANIZED HEALTH CARE EDUCATION/TRAINING PROGRAM

## 2025-07-01 PROCEDURE — 3078F DIAST BP <80 MM HG: CPT | Mod: CPTII,,, | Performed by: STUDENT IN AN ORGANIZED HEALTH CARE EDUCATION/TRAINING PROGRAM

## 2025-07-01 PROCEDURE — 3008F BODY MASS INDEX DOCD: CPT | Mod: CPTII,,, | Performed by: STUDENT IN AN ORGANIZED HEALTH CARE EDUCATION/TRAINING PROGRAM

## 2025-07-01 PROCEDURE — 99214 OFFICE O/P EST MOD 30 MIN: CPT | Mod: ,,, | Performed by: STUDENT IN AN ORGANIZED HEALTH CARE EDUCATION/TRAINING PROGRAM

## 2025-07-01 PROCEDURE — 1159F MED LIST DOCD IN RCRD: CPT | Mod: CPTII,,, | Performed by: STUDENT IN AN ORGANIZED HEALTH CARE EDUCATION/TRAINING PROGRAM

## 2025-07-01 PROCEDURE — 1160F RVW MEDS BY RX/DR IN RCRD: CPT | Mod: CPTII,,, | Performed by: STUDENT IN AN ORGANIZED HEALTH CARE EDUCATION/TRAINING PROGRAM

## 2025-07-01 RX ORDER — TIRZEPATIDE 2.5 MG/.5ML
2.5 INJECTION, SOLUTION SUBCUTANEOUS
Qty: 4 PEN | Refills: 0 | Status: SHIPPED | OUTPATIENT
Start: 2025-07-01

## 2025-07-01 NOTE — TELEPHONE ENCOUNTER
Message sent to Dr Acosta. Valery is not covered by her insurance. PA not an option. She would like to know if mounjaro could be sent to the pharmacy.

## 2025-07-01 NOTE — PROGRESS NOTES
"Chief Complaint  Chief Complaint   Patient presents with    Weight loss management      Would like to discuss options with the GLP-1 medications        HPI  Carmita Menjivar is a 49 y.o. female with medical diagnoses as listed in the medical history and problem list that presents for weight management. Patient has been steadily gaining weight due to diet within the last couple years despite exercising almost daily. Patient has done some researching about GLP-1 injection for weight loss and would like an option to start it.   Denies other complaints today     Health Maintenance         Date Due Completion Date    Mammogram 09/10/2025 9/10/2024    HIV Screening 12/14/2025 (Originally 9/13/1990) ---    Influenza Vaccine (1) 09/01/2025 12/12/2024 (Declined)    Override on 12/12/2024: Declined    Hemoglobin A1c (Diabetic Prevention Screening) 01/15/2028 1/15/2025    Cervical Cancer Screening 05/08/2028 5/8/2025    Lipid Panel 01/15/2030 1/15/2025    TETANUS VACCINE 05/22/2033 5/22/2023    Colorectal Cancer Screening 10/30/2033 10/30/2023    RSV Vaccine (Age 60+ and Pregnant patients) (1 - 1-dose 75+ series) 09/13/2050 ---            ALLERGIES AND MEDICATIONS: updated and reviewed.  Review of patient's allergies indicates:   Allergen Reactions    Mitiwanga Rash     Current Medications[1]    Histories are reviewed and updated as appropriate     Review of Systems  Comprehensive review of system performed- negative except noted in HPI       Objective:   Vitals:    07/01/25 0807   BP: 124/70   BP Location: Left arm   Patient Position: Sitting   Pulse: 100   Resp: 18   Temp: 97.2 °F (36.2 °C)   TempSrc: Oral   SpO2: 99%   Weight: 100.2 kg (221 lb)   Height: 5' 8" (1.727 m)    Body mass index is 33.6 kg/m².  Physical Exam  Vitals and nursing note reviewed.   Constitutional:       General: She is not in acute distress.     Appearance: Normal appearance.   HENT:      Head: Normocephalic and atraumatic.      Mouth/Throat:      Mouth: " Mucous membranes are moist.   Eyes:      Extraocular Movements: Extraocular movements intact.      Conjunctiva/sclera: Conjunctivae normal.   Cardiovascular:      Rate and Rhythm: Normal rate.   Pulmonary:      Effort: Pulmonary effort is normal.   Musculoskeletal:         General: Normal range of motion.      Cervical back: Normal range of motion.   Skin:     General: Skin is warm and dry.   Neurological:      General: No focal deficit present.      Mental Status: She is alert and oriented to person, place, and time. Mental status is at baseline.   Psychiatric:         Mood and Affect: Mood normal.         Behavior: Behavior normal.         Thought Content: Thought content normal.         Judgment: Judgment normal.           Assessment & Plan  1. Class 1 drug-induced obesity with serious comorbidity and body mass index (BMI) of 33.0 to 33.9 in adult  Long discussion with patient about benefits, risks and side effects of GLP-1 injection.   Rx sent to pharmacy   Encourage patient to have a diet and continue with exercise regularly   -     tirzepatide, weight loss, (ZEPBOUND) 2.5 mg/0.5 mL PnIj; Inject 2.5 mg into the skin every 7 days.  Dispense: 4 Pen; Refill: 0    2. Mixed hyperlipidemia  See#1      Follow up in about 6 weeks (around 8/12/2025) for Weight management.           [1]   Current Outpatient Medications   Medication Sig Dispense Refill    cyclobenzaprine (FLEXERIL) 10 MG tablet Take 1 tablet (10 mg total) by mouth 3 (three) times daily as needed for Muscle spasms. 30 tablet 1    ibuprofen (ADVIL,MOTRIN) 200 MG tablet Take 200 mg by mouth every 6 (six) hours as needed for Pain.      melatonin 10 mg Tab Take 15 mg by mouth every evening.      multivitamin (THERAGRAN) per tablet Take 1 tablet by mouth once daily.      tirzepatide, weight loss, (ZEPBOUND) 2.5 mg/0.5 mL PnIj Inject 2.5 mg into the skin every 7 days. 4 Pen 0     No current facility-administered medications for this visit.

## 2025-07-01 NOTE — TELEPHONE ENCOUNTER
Copied from CRM #[id number]. Topic: General Inquiry - Patient Advice  >> Jul 1, 2025  9:05 AM Deangelo Humphries wrote:  Who Called: Carmita Menjivar    Caller is requesting assistance/information from provider's office.    Symptoms (please be specific): N/A   How long has patient had these symptoms:  N/A  List of preferred pharmacies on file (remove unneeded): [unfilled]  If different, enter pharmacy into here including location and phone number: N/A      Preferred Method of Contact: Phone Call  Patient's Preferred Phone Number on File: [phone]   Best Call Back Number, if different:  Additional Information: pt would like a call back in regards to prescription just sent to the pharmacy. Pt said she received voicemail from pharmacy

## 2025-07-02 ENCOUNTER — TELEPHONE (OUTPATIENT)
Dept: PRIMARY CARE CLINIC | Facility: CLINIC | Age: 50
End: 2025-07-02

## 2025-07-02 NOTE — TELEPHONE ENCOUNTER
Copied from CRM #[id number]. Topic: General Inquiry - Patient Advice  >> Jul 2, 2025  9:12 AM Danielle wrote:  .Who Called: Carmita Menjivar    Caller is requesting assistance/information from provider's office.    Symptoms (please be specific): na   How long has patient had these symptoms:  na  List of preferred pharmacies on file (remove unneeded): [unfilled]  If different, enter pharmacy into here including location and phone number: na      Preferred Method of Contact: Phone Call  Patient's Preferred Phone Number on File: [phone]   Best Call Back Number, if different:  Additional Information: pt wants nurse to give her a call regarding her prescription. She states medication is too expensive

## 2025-07-02 NOTE — TELEPHONE ENCOUNTER
Copied from CRM #[id number]. Topic: General Inquiry - Patient Advice  >> Jul 2, 2025  9:12 AM Danielle wrote:  .Who Called: Carmita Menjivar    Caller is requesting assistance/information from provider's office.    Symptoms (please be specific): na   How long has patient had these symptoms:  na  List of preferred pharmacies on file (remove unneeded): [unfilled]  If different, enter pharmacy into here including location and phone number: na      Preferred Method of Contact: Phone Call  Patient's Preferred Phone Number on File: [phone]   Best Call Back Number, if different:  Additional Information: pt wants nurse to give her a call regarding her prescription. She states medication is too expensive

## 2025-07-02 NOTE — TELEPHONE ENCOUNTER
Zepbound is not covered by insurance. She would like to start the compound injections at [facility].

## 2025-07-03 ENCOUNTER — TELEPHONE (OUTPATIENT)
Dept: PRIMARY CARE CLINIC | Facility: CLINIC | Age: 50
End: 2025-07-03
Payer: COMMERCIAL

## 2025-07-03 NOTE — TELEPHONE ENCOUNTER
Copied from CRM #[id number]. Topic: Medications - Medication Question  >> Jul 3, 2025  9:18 AM Bala wrote:  Who Called: Carmita Menjivar    Caller is requesting assistance/information from provider's office.    Symptoms (please be specific):    How long has patient had these symptoms:    List of preferred pharmacies on file (remove unneeded): [unfilled]  If different, enter pharmacy into here including location and phone number:       Preferred Method of Contact: Phone Call  Patient's Preferred Phone Number on File: [phone]   Best Call Back Number, if different:  Additional Information: [facility] stated [facility]'t receive script for zepbound pls advise

## 2025-08-01 ENCOUNTER — PATIENT MESSAGE (OUTPATIENT)
Dept: PRIMARY CARE CLINIC | Facility: CLINIC | Age: 50
End: 2025-08-01
Payer: COMMERCIAL

## 2025-08-01 ENCOUNTER — DOCUMENTATION ONLY (OUTPATIENT)
Dept: PRIMARY CARE CLINIC | Facility: CLINIC | Age: 50
End: 2025-08-01
Payer: COMMERCIAL

## 2025-08-13 ENCOUNTER — OFFICE VISIT (OUTPATIENT)
Dept: PRIMARY CARE CLINIC | Facility: CLINIC | Age: 50
End: 2025-08-13
Payer: COMMERCIAL

## 2025-08-13 DIAGNOSIS — E66.3 OVERWEIGHT (BMI 25.0-29.9): Primary | ICD-10-CM

## 2025-08-13 DIAGNOSIS — M54.12 CERVICAL RADICULITIS: ICD-10-CM

## 2025-08-13 RX ORDER — CYCLOBENZAPRINE HCL 10 MG
10 TABLET ORAL 3 TIMES DAILY PRN
Qty: 30 TABLET | Refills: 2 | Status: SHIPPED | OUTPATIENT
Start: 2025-08-13

## (undated) DEVICE — DRAPE MEDIUM SHEET 40X70IN

## (undated) DEVICE — SYR EPILOR LUER-LOK LOR 7ML

## (undated) DEVICE — SYR 3CC LUER LOC

## (undated) DEVICE — GLOVE PROTEXIS PI CRM 6.5

## (undated) DEVICE — SYR DISP LL 5CC

## (undated) DEVICE — SET SMARTSITE EXT SMALLBORE NF

## (undated) DEVICE — CHLORAPREP 10.5 ML APPLICATOR

## (undated) DEVICE — DRAPE UTILITY W/ TAPE 20X30IN

## (undated) DEVICE — CONTRAST ISOVUE M 200 20ML VIL

## (undated) DEVICE — NDL SYR 10ML 18X1.5 LL BLUNT

## (undated) DEVICE — CANNULA AIRLIFE ETCO2 NSL 7FT

## (undated) DEVICE — NDL HYPO REG 25G X 1 1/2

## (undated) DEVICE — POSITIONER HEAD ADULT

## (undated) DEVICE — KIT SURGICAL TURNOVER

## (undated) DEVICE — SYR 10CC LUER LOCK

## (undated) DEVICE — NDL SPINAL 22GA 3.5 IN QUINCKE

## (undated) DEVICE — APPLICATOR CHLORAPREP ORN 26ML

## (undated) DEVICE — NDL BLUNT FILL 18G 1IN

## (undated) DEVICE — NDL EPIDURAL TOUHY 18G X3.5

## (undated) DEVICE — BANDAGE SHEER STRIP 3/4X3IN

## (undated) DEVICE — Device

## (undated) DEVICE — GLOVE PROTEXIS LTX MICRO 6.5

## (undated) DEVICE — SYR POSIFLUSH NACL PREFIL 10ML

## (undated) DEVICE — NDL FLTR 5MCRN BLNT TIP 18GX1

## (undated) DEVICE — SYR 3ML LL 18GA 1.5IN